# Patient Record
Sex: MALE | HISPANIC OR LATINO | Employment: FULL TIME | URBAN - METROPOLITAN AREA
[De-identification: names, ages, dates, MRNs, and addresses within clinical notes are randomized per-mention and may not be internally consistent; named-entity substitution may affect disease eponyms.]

---

## 2021-09-23 ENCOUNTER — HOSPITAL ENCOUNTER (EMERGENCY)
Facility: MEDICAL CENTER | Age: 31
End: 2021-09-23

## 2021-09-23 VITALS
HEIGHT: 73 IN | RESPIRATION RATE: 20 BRPM | OXYGEN SATURATION: 98 % | WEIGHT: 220 LBS | DIASTOLIC BLOOD PRESSURE: 106 MMHG | HEART RATE: 92 BPM | BODY MASS INDEX: 29.16 KG/M2 | SYSTOLIC BLOOD PRESSURE: 161 MMHG | TEMPERATURE: 97.5 F

## 2021-09-23 PROCEDURE — 302449 STATCHG TRIAGE ONLY (STATISTIC)

## 2021-09-23 NOTE — ED NOTES
PT stated he felt fine and no longer wanted to wait to see an physician. PT advised if symptoms return to seek further medical care. PT AMA form signed. PT dismissed.

## 2021-09-23 NOTE — ED TRIAGE NOTES
Waldemar Moreno  31 y.o.  male  Chief Complaint   Patient presents with   • Altered Mental Status     patient states that he is going to sleep at around 2 am. unable to focus and confused. also have a spike of fever , took 2 tabs of advil. patient dx with Covid last week. denies SOB/CP.    • Anxiety

## 2021-10-08 ENCOUNTER — TELEPHONE (OUTPATIENT)
Dept: SCHEDULING | Facility: IMAGING CENTER | Age: 31
End: 2021-10-08

## 2021-10-14 ENCOUNTER — OFFICE VISIT (OUTPATIENT)
Dept: URGENT CARE | Facility: PHYSICIAN GROUP | Age: 31
End: 2021-10-14
Payer: COMMERCIAL

## 2021-10-14 VITALS
RESPIRATION RATE: 18 BRPM | SYSTOLIC BLOOD PRESSURE: 142 MMHG | HEIGHT: 73 IN | OXYGEN SATURATION: 100 % | HEART RATE: 60 BPM | DIASTOLIC BLOOD PRESSURE: 90 MMHG | WEIGHT: 210 LBS | BODY MASS INDEX: 27.83 KG/M2 | TEMPERATURE: 98.1 F

## 2021-10-14 DIAGNOSIS — R22.42 LOCALIZED SWELLING OF LEFT LOWER EXTREMITY: ICD-10-CM

## 2021-10-14 PROCEDURE — 99203 OFFICE O/P NEW LOW 30 MIN: CPT | Performed by: PHYSICIAN ASSISTANT

## 2021-10-14 ASSESSMENT — ENCOUNTER SYMPTOMS
SENSORY CHANGE: 0
TINGLING: 0
MYALGIAS: 0
WEAKNESS: 0
SHORTNESS OF BREATH: 0
COUGH: 0
PALPITATIONS: 0

## 2021-10-14 NOTE — PROGRESS NOTES
"Subjective:   Waldemar Moreno is a 31 y.o. male who presents for Leg Swelling (x5 days, pt states left leg swelling, discomfort to put pressure )      HPI  31 y.o. male presents to urgent care with new problem to provider of localized swelling to left anterior shin noted few days ago.  Patient reports he may have gotten a bug bite and notes swelling, redness, and itching which has been improving since onset.  Denies specific injury.  He has not taken any over-the-counter antihistamines for symptomatic relief.  He denies unilateral lower extremity swelling, chest pain, or shortness of breath.  Reports history of COVID-19 viral infection about 1 month ago.  Patient is not vaccinated.  He denies history of DVT or PEs.    Review of Systems   Respiratory: Negative for cough and shortness of breath.    Cardiovascular: Negative for chest pain, palpitations and leg swelling.   Musculoskeletal: Negative for myalgias.   Skin: Positive for itching.        Redness, swelling of left shin   Neurological: Negative for tingling, sensory change and weakness.       There is no problem list on file for this patient.    History reviewed. No pertinent surgical history.  Social History     Tobacco Use   • Smoking status: Never Smoker   • Smokeless tobacco: Never Used   Vaping Use   • Vaping Use: Never used   Substance Use Topics   • Alcohol use: Yes     Comment: occ   • Drug use: Never      History reviewed. No pertinent family history.   (Allergies, Medications, & Tobacco/Substance Use were reconciled by the Medical Assistant and reviewed by myself. The family history is prepopulated)     Objective:     /90   Pulse 60   Temp 36.7 °C (98.1 °F) (Temporal)   Resp 18   Ht 1.854 m (6' 1\")   Wt 95.3 kg (210 lb)   SpO2 100%   BMI 27.71 kg/m²     Physical Exam  Vitals reviewed.   Constitutional:       Appearance: Normal appearance. He is well-developed.   HENT:      Head: Normocephalic and atraumatic.      Mouth/Throat:      Mouth: " Mucous membranes are moist.      Pharynx: Oropharynx is clear.   Eyes:      Conjunctiva/sclera: Conjunctivae normal.   Cardiovascular:      Rate and Rhythm: Normal rate and regular rhythm.      Heart sounds: Normal heart sounds.   Pulmonary:      Effort: Pulmonary effort is normal. No respiratory distress.      Breath sounds: Normal breath sounds.   Musculoskeletal:      Cervical back: Normal range of motion and neck supple.      Right lower leg: No edema.      Left lower leg: No edema.   Skin:     General: Skin is warm and dry.          Neurological:      General: No focal deficit present.      Mental Status: He is alert and oriented to person, place, and time.   Psychiatric:         Mood and Affect: Mood normal.         Behavior: Behavior normal.         Thought Content: Thought content normal.         Judgment: Judgment normal.         Assessment/Plan:     1. Localized swelling of left lower extremity       Area of localized swelling consistent with possible insect bite.  I recommend OTC Benadryl, rest, ice, and elevation.  Discussed return precautions including development of infection.  ED for worsening symptoms of unilateral lower extremity swelling, chest pain, or shortness of breath.  Patient has low risk for thrombosis.    Differential diagnosis, natural history, supportive care, and indications for immediate follow-up discussed.    Advised the patient to follow-up with the primary care physician for recheck, reevaluation, and consideration of further management.  Patient verbalized understanding of treatment plan and has no further questions regarding care.     I personally reviewed prior external notes and test results pertinent to today's visit.   Please note that this dictation was created using voice recognition software. I have made a reasonable attempt to correct obvious errors, but I expect that there are errors of grammar and possibly content that I did not discover before finalizing the  note.    This note was electronically signed by Denise Feliciano PA-C

## 2021-11-18 ENCOUNTER — HOSPITAL ENCOUNTER (OUTPATIENT)
Dept: LAB | Facility: MEDICAL CENTER | Age: 31
End: 2021-11-18
Attending: NURSE PRACTITIONER
Payer: COMMERCIAL

## 2021-11-18 ENCOUNTER — HOSPITAL ENCOUNTER (OUTPATIENT)
Dept: RADIOLOGY | Facility: MEDICAL CENTER | Age: 31
End: 2021-11-18
Attending: NURSE PRACTITIONER
Payer: COMMERCIAL

## 2021-11-18 ENCOUNTER — OFFICE VISIT (OUTPATIENT)
Dept: MEDICAL GROUP | Facility: PHYSICIAN GROUP | Age: 31
End: 2021-11-18
Payer: COMMERCIAL

## 2021-11-18 ENCOUNTER — TELEPHONE (OUTPATIENT)
Dept: MEDICAL GROUP | Facility: PHYSICIAN GROUP | Age: 31
End: 2021-11-18

## 2021-11-18 VITALS
SYSTOLIC BLOOD PRESSURE: 130 MMHG | TEMPERATURE: 98.1 F | OXYGEN SATURATION: 97 % | DIASTOLIC BLOOD PRESSURE: 90 MMHG | BODY MASS INDEX: 28.36 KG/M2 | HEART RATE: 60 BPM | WEIGHT: 214 LBS | HEIGHT: 73 IN

## 2021-11-18 DIAGNOSIS — R07.89 CHEST PRESSURE: ICD-10-CM

## 2021-11-18 DIAGNOSIS — R03.0 ELEVATED BLOOD PRESSURE READING IN OFFICE WITHOUT DIAGNOSIS OF HYPERTENSION: ICD-10-CM

## 2021-11-18 DIAGNOSIS — Z76.89 ENCOUNTER TO ESTABLISH CARE: ICD-10-CM

## 2021-11-18 DIAGNOSIS — S39.012A STRAIN OF LUMBAR PARASPINAL MUSCLE, INITIAL ENCOUNTER: ICD-10-CM

## 2021-11-18 PROBLEM — M54.50 ACUTE RIGHT-SIDED LOW BACK PAIN WITHOUT SCIATICA: Status: ACTIVE | Noted: 2021-11-18

## 2021-11-18 LAB
ALBUMIN SERPL BCP-MCNC: 4.8 G/DL (ref 3.2–4.9)
ALBUMIN/GLOB SERPL: 1.5 G/DL
ALP SERPL-CCNC: 75 U/L (ref 30–99)
ALT SERPL-CCNC: 11 U/L (ref 2–50)
ANION GAP SERPL CALC-SCNC: 9 MMOL/L (ref 7–16)
AST SERPL-CCNC: 15 U/L (ref 12–45)
BASOPHILS # BLD AUTO: 0.4 % (ref 0–1.8)
BASOPHILS # BLD: 0.02 K/UL (ref 0–0.12)
BILIRUB SERPL-MCNC: 1.6 MG/DL (ref 0.1–1.5)
BUN SERPL-MCNC: 17 MG/DL (ref 8–22)
CALCIUM SERPL-MCNC: 9.9 MG/DL (ref 8.5–10.5)
CHLORIDE SERPL-SCNC: 102 MMOL/L (ref 96–112)
CO2 SERPL-SCNC: 28 MMOL/L (ref 20–33)
CREAT SERPL-MCNC: 1.17 MG/DL (ref 0.5–1.4)
EOSINOPHIL # BLD AUTO: 0.11 K/UL (ref 0–0.51)
EOSINOPHIL NFR BLD: 2.2 % (ref 0–6.9)
ERYTHROCYTE [DISTWIDTH] IN BLOOD BY AUTOMATED COUNT: 41.1 FL (ref 35.9–50)
GLOBULIN SER CALC-MCNC: 3.2 G/DL (ref 1.9–3.5)
GLUCOSE SERPL-MCNC: 97 MG/DL (ref 65–99)
HCT VFR BLD AUTO: 46.2 % (ref 42–52)
HGB BLD-MCNC: 15.9 G/DL (ref 14–18)
IMM GRANULOCYTES # BLD AUTO: 0.02 K/UL (ref 0–0.11)
IMM GRANULOCYTES NFR BLD AUTO: 0.4 % (ref 0–0.9)
LYMPHOCYTES # BLD AUTO: 2.38 K/UL (ref 1–4.8)
LYMPHOCYTES NFR BLD: 46.8 % (ref 22–41)
MCH RBC QN AUTO: 31.5 PG (ref 27–33)
MCHC RBC AUTO-ENTMCNC: 34.4 G/DL (ref 33.7–35.3)
MCV RBC AUTO: 91.7 FL (ref 81.4–97.8)
MONOCYTES # BLD AUTO: 0.44 K/UL (ref 0–0.85)
MONOCYTES NFR BLD AUTO: 8.6 % (ref 0–13.4)
NEUTROPHILS # BLD AUTO: 2.12 K/UL (ref 1.82–7.42)
NEUTROPHILS NFR BLD: 41.6 % (ref 44–72)
NRBC # BLD AUTO: 0 K/UL
NRBC BLD-RTO: 0 /100 WBC
PLATELET # BLD AUTO: 167 K/UL (ref 164–446)
PMV BLD AUTO: 12.1 FL (ref 9–12.9)
POTASSIUM SERPL-SCNC: 4.7 MMOL/L (ref 3.6–5.5)
PROT SERPL-MCNC: 8 G/DL (ref 6–8.2)
RBC # BLD AUTO: 5.04 M/UL (ref 4.7–6.1)
SODIUM SERPL-SCNC: 139 MMOL/L (ref 135–145)
TROPONIN T SERPL-MCNC: 11 NG/L (ref 6–19)
TSH SERPL DL<=0.005 MIU/L-ACNC: 4.44 UIU/ML (ref 0.38–5.33)
WBC # BLD AUTO: 5.1 K/UL (ref 4.8–10.8)

## 2021-11-18 PROCEDURE — 93000 ELECTROCARDIOGRAM COMPLETE: CPT | Performed by: NURSE PRACTITIONER

## 2021-11-18 PROCEDURE — 36415 COLL VENOUS BLD VENIPUNCTURE: CPT

## 2021-11-18 PROCEDURE — 80053 COMPREHEN METABOLIC PANEL: CPT

## 2021-11-18 PROCEDURE — 99214 OFFICE O/P EST MOD 30 MIN: CPT | Performed by: NURSE PRACTITIONER

## 2021-11-18 PROCEDURE — 84443 ASSAY THYROID STIM HORMONE: CPT

## 2021-11-18 PROCEDURE — 85025 COMPLETE CBC W/AUTO DIFF WBC: CPT

## 2021-11-18 PROCEDURE — 84484 ASSAY OF TROPONIN QUANT: CPT

## 2021-11-18 PROCEDURE — 71046 X-RAY EXAM CHEST 2 VIEWS: CPT

## 2021-11-18 ASSESSMENT — PATIENT HEALTH QUESTIONNAIRE - PHQ9: CLINICAL INTERPRETATION OF PHQ2 SCORE: 0

## 2021-11-18 NOTE — TELEPHONE ENCOUNTER
----- Message from BRENTON Somers sent at 11/18/2021  2:56 PM PST -----  Please call patient and inform that his chest x-ray shows that his heart is normal size, no rib fractures or lung abnormalities.  We will wait on his lab results.  Again if he has any chest pain that is not resolving he is to go to the emergency room right away.  Thank you    BRENTON Somers

## 2021-11-18 NOTE — ASSESSMENT & PLAN NOTE
Acute medical problem. His blood pressure today is 130/90. He states that he does eat a lot of fast food and has a high salt diet. He has family history of hypertension in his father.

## 2021-11-18 NOTE — ASSESSMENT & PLAN NOTE
Acute medical problem. The chest pressure started 2 days ago. The pressure is intermittent. He woke up with the chest pressure. He does lift weights, no recent chest workouts. He is having back pain and increased stress in his life regarding his health. No aggravating factors, no chest pressure today. Denies crushing chest pain, shortness of breath, dizziness, palpations, blurry vision, left jaw pain, left arm pain, or diaphoresis. He has family history of hypertension and in his father elevated cholesterol.

## 2021-11-18 NOTE — ASSESSMENT & PLAN NOTE
Acute medical problem. The low back pain started 1 week ago. He has bilateral low back pain. He states that the back pain occurred after leaning over at a odd angle when bending down to  an object.The pain has improved with rest and Advil. On his days off from work his back pain improves.  He reports that 2 months ago he had COVID. Denies dysuria, hematuria, frequency, falls or trauma.

## 2021-11-18 NOTE — PROGRESS NOTES
Subjective:     CC:  Diagnoses of Encounter to establish care, Acute right-sided low back pain without sciatica, Chest pressure, and Elevated blood pressure reading in office without diagnosis of hypertension were pertinent to this visit.    HISTORY OF THE PRESENT ILLNESS: Patient is a 31 y.o. male. This pleasant patient is here today to establish care and discuss the following. He has not had prior PCP.    Acute right-sided low back pain without sciatica  Acute medical problem. The low back pain started 1 week ago. He has bilateral low back pain. He states that the back pain occurred after leaning over at a odd angle when bending down to  an object.The pain has improved with rest and Advil. On his days off from work his back pain improves.  He reports that 2 months ago he had COVID. Denies dysuria, hematuria, frequency, falls or trauma.    Chest pressure  Acute medical problem. The chest pressure started 2 days ago. The pressure is intermittent. He woke up with the chest pressure. He does lift weights, no recent chest workouts. He is having back pain and increased stress in his life regarding his health. No aggravating factors, no chest pressure today. Denies crushing chest pain, shortness of breath, dizziness, palpations, blurry vision, left jaw pain, left arm pain, or diaphoresis. He has family history of hypertension and in his father elevated cholesterol.     Elevated blood pressure reading in office without diagnosis of hypertension  Acute medical problem. His blood pressure today is 130/90. He states that he does eat a lot of fast food and has a high salt diet. He has family history of hypertension in his father.      Allergies: Patient has no known allergies.    No current [facility]-ordered outpatient medications on file.     No current [facility]-ordered facility-administered medications on file.       Past Medical History:   Diagnosis Date   • COVID        History reviewed. No pertinent surgical  "history.    Social History     Tobacco Use   • Smoking status: Never Smoker   • Smokeless tobacco: Never Used   Vaping Use   • Vaping Use: Never used   Substance Use Topics   • Alcohol use: Yes     Comment: occ   • Drug use: Never       Social History     Social History Narrative   • Not on file       Family History   Problem Relation Age of Onset   • No Known Problems Mother    • Hypertension Father    • Hyperlipidemia Father    • Hypertension Brother    • Obesity Brother    • No Known Problems Maternal Grandmother    • No Known Problems Maternal Grandfather    • No Known Problems Paternal Grandmother    • No Known Problems Paternal Grandfather        Health Maintenance: Due for COVID vaccine, Tdap and influenza vaccine. Declines vaccines today.       Objective:     Vital signs reviewed   Exam: /90 (BP Location: Left arm, Patient Position: Sitting, BP Cuff Size: Adult)   Pulse 60   Temp 36.7 °C (98.1 °F) (Temporal)   Ht 1.854 m (6' 1\")   Wt 97.1 kg (214 lb)   SpO2 97%  Body mass index is 28.23 kg/m².    Gen: Alert and oriented, No apparent distress.  Neck: Neck is supple without lymphadenopathy.  Lungs: Normal effort, CTA bilaterally, no wheezes, rhonchi, or rales.   No CVA tenderness.  CV: Regular rate and rhythm. No murmurs, rubs, or gallops. No chest pain or pressure today, no pain on palpation to chest or surrounding musculature.   Ext: No clubbing, cyanosis, edema. No pain on palpation to spinous processes. No pain on palpation to lower lumbar.      EKG Interpretation-HR is 51 there are no previous tracings available for comparison, normal sinus rhythm, with slight ST elevation in leads V3-V5.      Assessment & Plan:   31 y.o. male with the following -    1. Encounter to establish care  Acute uncomplicated problem. Care established.     2. Strain of lumbar paraspinal muscle, initial encounter  Acute uncomplicated problem.  We discussed his symptoms are consistent with muscular strain.  Recommend he " continue with stretching and anti-inflammatories as needed.  We discussed good body mechanics when lifting objects.  Activity as tolerated.    3. Chest pressure  Acute complicated problem.  I reviewed his EKG today in office with our physician Dr. Salinas recommends can complete outpatient work-up.  Patient does not have any chest pressure or chest pain today.  Will check stat CBC, CMP, TSH and troponin today.  Will check stat chest x-ray today.  Discussed with patient that if his symptoms worsen he is to go to the emergency room right away.  He verbalized understanding.    I discussed his chest xray with results with him.   IMPRESSION:   No active disease    - EKG  - Lipid Profile; Future  - CBC WITH DIFFERENTIAL; Future  - Comp Metabolic Panel; Future  - TSH WITH REFLEX TO FT4; Future  - TROPONIN; Future  - DX-CHEST-2 VIEWS; Future    4. Elevated blood pressure reading in office without diagnosis of hypertension  Acute uncomplicated problem.  Checking stat labs for CBC, TSH, CMP and lipid panel today.  He will return in 2 weeks for follow-up on his blood pressure.  We discussed decreasing his salt intake and exercising as tolerated.  He states that his normal weight is around 220 pounds but he did lose 10 pounds with his recent Covid infection.  - Lipid Profile; Future  - CBC WITH DIFFERENTIAL; Future  - Comp Metabolic Panel; Future  - TSH WITH REFLEX TO FT4; Future          Return in about 2 weeks (around 12/2/2021) for Labs.    Please note that this dictation was created using voice recognition software. I have made every reasonable attempt to correct obvious errors, but I expect that there are errors of grammar and possibly content that I did not discover before finalizing the note.

## 2021-11-18 NOTE — TELEPHONE ENCOUNTER
I called the patient and left a message, as well as sent a message, conveying Rylee's message that  his chest x-ray shows that his heart is normal size, no rib fractures or lung abnormalities.  I let him know that we are waiting on the lab results.  I told the patient that if he has any chest pain that is not resolving he is to go to the emergency room right away.

## 2021-12-02 ENCOUNTER — OFFICE VISIT (OUTPATIENT)
Dept: MEDICAL GROUP | Facility: PHYSICIAN GROUP | Age: 31
End: 2021-12-02
Payer: COMMERCIAL

## 2021-12-02 VITALS
HEIGHT: 73 IN | HEART RATE: 53 BPM | BODY MASS INDEX: 28.23 KG/M2 | SYSTOLIC BLOOD PRESSURE: 136 MMHG | OXYGEN SATURATION: 99 % | DIASTOLIC BLOOD PRESSURE: 86 MMHG | TEMPERATURE: 97.7 F | WEIGHT: 213 LBS

## 2021-12-02 DIAGNOSIS — M54.50 ACUTE RIGHT-SIDED LOW BACK PAIN WITHOUT SCIATICA: ICD-10-CM

## 2021-12-02 DIAGNOSIS — R03.0 ELEVATED BLOOD PRESSURE READING IN OFFICE WITHOUT DIAGNOSIS OF HYPERTENSION: ICD-10-CM

## 2021-12-02 DIAGNOSIS — R42 VERTIGO: ICD-10-CM

## 2021-12-02 PROBLEM — R07.89 CHEST PRESSURE: Status: RESOLVED | Noted: 2021-11-18 | Resolved: 2021-12-02

## 2021-12-02 PROCEDURE — 99214 OFFICE O/P EST MOD 30 MIN: CPT | Performed by: NURSE PRACTITIONER

## 2021-12-02 RX ORDER — MECLIZINE HYDROCHLORIDE 25 MG/1
25 TABLET ORAL 3 TIMES DAILY PRN
Qty: 30 TABLET | Refills: 0 | Status: SHIPPED | OUTPATIENT
Start: 2021-12-02 | End: 2022-06-02

## 2021-12-02 ASSESSMENT — ANXIETY QUESTIONNAIRES
1. FEELING NERVOUS, ANXIOUS, OR ON EDGE: NOT AT ALL
5. BEING SO RESTLESS THAT IT IS HARD TO SIT STILL: NOT AT ALL
2. NOT BEING ABLE TO STOP OR CONTROL WORRYING: NOT AT ALL
3. WORRYING TOO MUCH ABOUT DIFFERENT THINGS: NOT AT ALL
4. TROUBLE RELAXING: SEVERAL DAYS
7. FEELING AFRAID AS IF SOMETHING AWFUL MIGHT HAPPEN: NOT AT ALL
6. BECOMING EASILY ANNOYED OR IRRITABLE: NOT AT ALL
GAD7 TOTAL SCORE: 1

## 2021-12-02 ASSESSMENT — FIBROSIS 4 INDEX: FIB4 SCORE: 0.84

## 2021-12-02 NOTE — ASSESSMENT & PLAN NOTE
He states that his back pain is improved but still having intermittent soreness. He continues with stretching. He is active.

## 2021-12-02 NOTE — PROGRESS NOTES
"Subjective:     CC: lab results     HPI:   Waldemar presents today with the following:     Acute right-sided low back pain without sciatica  He states that his back pain is improved but still having intermittent soreness. He continues with stretching. He is active.     Elevated blood pressure reading in office without diagnosis of hypertension  His initial blood pressure today is 136/86 today. Last 4 days had dizziness that felt that the \"room is spinning\". the dizziness worsened with bending over. No symptoms today. No recurrent chest pain. His brother who is 35 is on blood pressure medication. His father was diagnosed late 50's and started on blood pressure medication. He had recent labs completed that he would like to review.       Past Medical History:   Diagnosis Date   • COVID        Social History     Tobacco Use   • Smoking status: Never Smoker   • Smokeless tobacco: Never Used   Vaping Use   • Vaping Use: Never used   Substance Use Topics   • Alcohol use: Yes     Comment: occ   • Drug use: Never       Current Outpatient Medications Ordered in Epic   Medication Sig Dispense Refill   • meclizine (ANTIVERT) 25 MG Tab Take 1 Tablet by mouth 3 times a day as needed. 30 Tablet 0     No current Epic-ordered facility-administered medications on file.       Allergies:  Patient has no known allergies.    Health Maintenance: Due for vaccines. Declines today.       Objective:     Vital signs reviewed   Exam:  /86 (BP Location: Right arm, Patient Position: Sitting, BP Cuff Size: Adult)   Pulse (!) 53   Temp 36.5 °C (97.7 °F) (Temporal)   Ht 1.854 m (6' 1\")   Wt 96.6 kg (213 lb)   SpO2 99%   BMI 28.10 kg/m²  Body mass index is 28.1 kg/m².      General: Normal appearing. No distress.  HENT: Normocephalic. Ears normal shape and contour, canals are clear bilaterally, tympanic membranes are benign.  Eyes: Eyes conjunctiva clear lids without ptosis,lids normal.  Pulmonary: Clear to ausculation.  Normal effort. No " rales, ronchi, or wheezing.  Cardiovascular: Regular rate and rhythm without murmur.   Skin: Warm and dry.  No obvious lesions.  Musculoskeletal: Normal gait. No extremity cyanosis, clubbing, or edema.  Psych: Normal mood and affect. Alert and oriented x3. Judgment and insight is normal.      Assessment & Plan:     31 y.o. male with the following -     1. Vertigo  Acute uncomplicated problem. Ear exam benign. Start meclizine. Return if symptoms worsen.   - meclizine (ANTIVERT) 25 MG Tab; Take 1 Tablet by mouth 3 times a day as needed.  Dispense: 30 Tablet; Refill: 0    2. Elevated blood pressure reading in office without diagnosis of hypertension  Chronic stable problem. Blood pressure is at goal today. Discussed diet and lifestyle modifications and reinforced diet and lifestyle modifications. Discussed purchasing a blood pressure cuff and monitoring his blood pressure at home. Discussed his question of the less than 130/80. Home blood pressure instruction provided to patient today. Return in 6 months or sooner if his blood pressure is above goal.    3. Acute right-sided low back pain without sciatica  Chronic stable problem. Continue stretching exercises. Discussed using good body mechanics. Continue to monitor.       Return in about 6 months (around 6/2/2022) for blood pressure.    Please note that this dictation was created using voice recognition software. I have made every reasonable attempt to correct obvious errors, but I expect that there are errors of grammar and possibly content that I did not discover before finalizing the note.

## 2021-12-02 NOTE — ASSESSMENT & PLAN NOTE
"His initial blood pressure today is 136/86 today. Last 4 days had dizziness that felt that the \"room is spinning\". the dizziness worsened with bending over. No symptoms today. No recurrent chest pain. His brother who is 35 is on blood pressure medication. His father was diagnosed late 50's and started on blood pressure medication. He had recent labs completed that he would like to review.   "

## 2022-06-02 ENCOUNTER — OFFICE VISIT (OUTPATIENT)
Dept: MEDICAL GROUP | Facility: PHYSICIAN GROUP | Age: 32
End: 2022-06-02
Payer: COMMERCIAL

## 2022-06-02 VITALS
SYSTOLIC BLOOD PRESSURE: 128 MMHG | WEIGHT: 211 LBS | HEART RATE: 62 BPM | DIASTOLIC BLOOD PRESSURE: 80 MMHG | TEMPERATURE: 97.9 F | BODY MASS INDEX: 27.96 KG/M2 | OXYGEN SATURATION: 98 % | HEIGHT: 73 IN

## 2022-06-02 DIAGNOSIS — R03.0 ELEVATED BLOOD PRESSURE READING IN OFFICE WITHOUT DIAGNOSIS OF HYPERTENSION: ICD-10-CM

## 2022-06-02 PROBLEM — M54.50 ACUTE RIGHT-SIDED LOW BACK PAIN WITHOUT SCIATICA: Status: RESOLVED | Noted: 2021-11-18 | Resolved: 2022-06-02

## 2022-06-02 PROCEDURE — 99212 OFFICE O/P EST SF 10 MIN: CPT | Performed by: NURSE PRACTITIONER

## 2022-06-02 ASSESSMENT — PATIENT HEALTH QUESTIONNAIRE - PHQ9: CLINICAL INTERPRETATION OF PHQ2 SCORE: 0

## 2022-06-02 ASSESSMENT — FIBROSIS 4 INDEX: FIB4 SCORE: 0.84

## 2022-06-02 NOTE — PROGRESS NOTES
"Subjective:     CC: hypertension follow-up    HPI:   Waldemar presents today with the following:    Elevated blood pressure reading in office without diagnosis of hypertension  His blood pressure is at goal today. He has been checking his blood pressure at home. His home readings have been ranging 120-130's/50-80's. He watches his diet and exercises.       Past Medical History:   Diagnosis Date   • COVID        Social History     Tobacco Use   • Smoking status: Never Smoker   • Smokeless tobacco: Never Used   Vaping Use   • Vaping Use: Never used   Substance Use Topics   • Alcohol use: Yes     Comment: occ   • Drug use: Never       No current Epic-ordered outpatient medications on file.     No current Epic-ordered facility-administered medications on file.       Allergies:  Patient has no known allergies.    Health Maintenance: Due for COVID vaccine and Tdap. Declines Tdap today.       Objective:     Vital signs reviewed  Exam:  /80 (BP Location: Left arm, Patient Position: Sitting, BP Cuff Size: Adult)   Pulse 62   Temp 36.6 °C (97.9 °F) (Temporal)   Ht 1.854 m (6' 1\")   Wt 95.7 kg (211 lb)   SpO2 98%   BMI 27.84 kg/m²  Body mass index is 27.84 kg/m².    Gen: Alert and oriented, No apparent distress.  Lungs: Normal effort, CTA bilaterally, no wheezes, rhonchi, or rales  CV: Regular rate and rhythm. No murmurs, rubs, or gallops.  Ext: No clubbing, cyanosis, edema.      Assessment & Plan:     31 y.o. male with the following -     1. Elevated blood pressure reading in office without diagnosis of hypertension  Chronic stable problem. Problem resolved. Continue intermittent home blood pressure monitoring. Continue health diet and 150 minutes a week of moderate intensity exercise. Return for annual around November 2022.       Return in about 5 months (around 11/2/2022) for annual.    Please note that this dictation was created using voice recognition software. I have made every reasonable attempt to correct " obvious errors, but I expect that there are errors of grammar and possibly content that I did not discover before finalizing the note.

## 2022-06-02 NOTE — ASSESSMENT & PLAN NOTE
His blood pressure is at goal today. He has been checking his blood pressure at home. His home readings have been ranging 120-130's/50-80's. He watches his diet and exercises.

## 2022-11-07 ENCOUNTER — TELEPHONE (OUTPATIENT)
Dept: MEDICAL GROUP | Facility: PHYSICIAN GROUP | Age: 32
End: 2022-11-07
Payer: COMMERCIAL

## 2023-03-09 ENCOUNTER — OFFICE VISIT (OUTPATIENT)
Dept: MEDICAL GROUP | Facility: PHYSICIAN GROUP | Age: 33
End: 2023-03-09
Payer: COMMERCIAL

## 2023-03-09 VITALS
OXYGEN SATURATION: 98 % | DIASTOLIC BLOOD PRESSURE: 74 MMHG | HEART RATE: 56 BPM | HEIGHT: 73 IN | WEIGHT: 214 LBS | TEMPERATURE: 97 F | SYSTOLIC BLOOD PRESSURE: 124 MMHG | BODY MASS INDEX: 28.36 KG/M2

## 2023-03-09 DIAGNOSIS — Z11.59 NEED FOR HEPATITIS C SCREENING TEST: ICD-10-CM

## 2023-03-09 DIAGNOSIS — Z13.29 SCREENING FOR ENDOCRINE, METABOLIC AND IMMUNITY DISORDER: ICD-10-CM

## 2023-03-09 DIAGNOSIS — Z13.0 SCREENING FOR ENDOCRINE, METABOLIC AND IMMUNITY DISORDER: ICD-10-CM

## 2023-03-09 DIAGNOSIS — Z13.228 SCREENING FOR ENDOCRINE, METABOLIC AND IMMUNITY DISORDER: ICD-10-CM

## 2023-03-09 DIAGNOSIS — Z13.220 SCREENING FOR LIPID DISORDERS: ICD-10-CM

## 2023-03-09 DIAGNOSIS — Z00.00 ANNUAL VISIT FOR GENERAL ADULT MEDICAL EXAMINATION WITHOUT ABNORMAL FINDINGS: ICD-10-CM

## 2023-03-09 PROCEDURE — 99395 PREV VISIT EST AGE 18-39: CPT | Performed by: NURSE PRACTITIONER

## 2023-03-09 SDOH — ECONOMIC STABILITY: INCOME INSECURITY: HOW HARD IS IT FOR YOU TO PAY FOR THE VERY BASICS LIKE FOOD, HOUSING, MEDICAL CARE, AND HEATING?: NOT VERY HARD

## 2023-03-09 SDOH — ECONOMIC STABILITY: INCOME INSECURITY: IN THE LAST 12 MONTHS, WAS THERE A TIME WHEN YOU WERE NOT ABLE TO PAY THE MORTGAGE OR RENT ON TIME?: NO

## 2023-03-09 SDOH — ECONOMIC STABILITY: FOOD INSECURITY: WITHIN THE PAST 12 MONTHS, YOU WORRIED THAT YOUR FOOD WOULD RUN OUT BEFORE YOU GOT MONEY TO BUY MORE.: NEVER TRUE

## 2023-03-09 SDOH — ECONOMIC STABILITY: FOOD INSECURITY: WITHIN THE PAST 12 MONTHS, THE FOOD YOU BOUGHT JUST DIDN'T LAST AND YOU DIDN'T HAVE MONEY TO GET MORE.: NEVER TRUE

## 2023-03-09 SDOH — ECONOMIC STABILITY: TRANSPORTATION INSECURITY
IN THE PAST 12 MONTHS, HAS LACK OF TRANSPORTATION KEPT YOU FROM MEETINGS, WORK, OR FROM GETTING THINGS NEEDED FOR DAILY LIVING?: NO

## 2023-03-09 SDOH — ECONOMIC STABILITY: HOUSING INSECURITY
IN THE LAST 12 MONTHS, WAS THERE A TIME WHEN YOU DID NOT HAVE A STEADY PLACE TO SLEEP OR SLEPT IN A SHELTER (INCLUDING NOW)?: NO

## 2023-03-09 SDOH — ECONOMIC STABILITY: TRANSPORTATION INSECURITY
IN THE PAST 12 MONTHS, HAS THE LACK OF TRANSPORTATION KEPT YOU FROM MEDICAL APPOINTMENTS OR FROM GETTING MEDICATIONS?: NO

## 2023-03-09 SDOH — HEALTH STABILITY: PHYSICAL HEALTH: ON AVERAGE, HOW MANY DAYS PER WEEK DO YOU ENGAGE IN MODERATE TO STRENUOUS EXERCISE (LIKE A BRISK WALK)?: 4 DAYS

## 2023-03-09 SDOH — ECONOMIC STABILITY: TRANSPORTATION INSECURITY
IN THE PAST 12 MONTHS, HAS LACK OF RELIABLE TRANSPORTATION KEPT YOU FROM MEDICAL APPOINTMENTS, MEETINGS, WORK OR FROM GETTING THINGS NEEDED FOR DAILY LIVING?: NO

## 2023-03-09 SDOH — ECONOMIC STABILITY: HOUSING INSECURITY: IN THE LAST 12 MONTHS, HOW MANY PLACES HAVE YOU LIVED?: 2

## 2023-03-09 SDOH — HEALTH STABILITY: PHYSICAL HEALTH: ON AVERAGE, HOW MANY MINUTES DO YOU ENGAGE IN EXERCISE AT THIS LEVEL?: 60 MIN

## 2023-03-09 SDOH — HEALTH STABILITY: MENTAL HEALTH
STRESS IS WHEN SOMEONE FEELS TENSE, NERVOUS, ANXIOUS, OR CAN'T SLEEP AT NIGHT BECAUSE THEIR MIND IS TROUBLED. HOW STRESSED ARE YOU?: NOT AT ALL

## 2023-03-09 ASSESSMENT — SOCIAL DETERMINANTS OF HEALTH (SDOH)
HOW OFTEN DO YOU ATTEND CHURCH OR RELIGIOUS SERVICES?: 1 TO 4 TIMES PER YEAR
HOW MANY DRINKS CONTAINING ALCOHOL DO YOU HAVE ON A TYPICAL DAY WHEN YOU ARE DRINKING: 1 OR 2
DO YOU BELONG TO ANY CLUBS OR ORGANIZATIONS SUCH AS CHURCH GROUPS UNIONS, FRATERNAL OR ATHLETIC GROUPS, OR SCHOOL GROUPS?: NO
DO YOU BELONG TO ANY CLUBS OR ORGANIZATIONS SUCH AS CHURCH GROUPS UNIONS, FRATERNAL OR ATHLETIC GROUPS, OR SCHOOL GROUPS?: NO
WITHIN THE PAST 12 MONTHS, YOU WORRIED THAT YOUR FOOD WOULD RUN OUT BEFORE YOU GOT THE MONEY TO BUY MORE: NEVER TRUE
HOW OFTEN DO YOU ATTENT MEETINGS OF THE CLUB OR ORGANIZATION YOU BELONG TO?: NEVER
IN A TYPICAL WEEK, HOW MANY TIMES DO YOU TALK ON THE PHONE WITH FAMILY, FRIENDS, OR NEIGHBORS?: ONCE A WEEK
ARE YOU MARRIED, WIDOWED, DIVORCED, SEPARATED, NEVER MARRIED, OR LIVING WITH A PARTNER?: LIVING WITH PARTNER
HOW OFTEN DO YOU GET TOGETHER WITH FRIENDS OR RELATIVES?: ONCE A WEEK
HOW OFTEN DO YOU ATTEND CHURCH OR RELIGIOUS SERVICES?: 1 TO 4 TIMES PER YEAR
HOW OFTEN DO YOU HAVE A DRINK CONTAINING ALCOHOL: 2-4 TIMES A MONTH
HOW HARD IS IT FOR YOU TO PAY FOR THE VERY BASICS LIKE FOOD, HOUSING, MEDICAL CARE, AND HEATING?: NOT VERY HARD
HOW OFTEN DO YOU GET TOGETHER WITH FRIENDS OR RELATIVES?: ONCE A WEEK
HOW OFTEN DO YOU HAVE SIX OR MORE DRINKS ON ONE OCCASION: LESS THAN MONTHLY
HOW OFTEN DO YOU ATTENT MEETINGS OF THE CLUB OR ORGANIZATION YOU BELONG TO?: NEVER
IN A TYPICAL WEEK, HOW MANY TIMES DO YOU TALK ON THE PHONE WITH FAMILY, FRIENDS, OR NEIGHBORS?: ONCE A WEEK
ARE YOU MARRIED, WIDOWED, DIVORCED, SEPARATED, NEVER MARRIED, OR LIVING WITH A PARTNER?: LIVING WITH PARTNER

## 2023-03-09 ASSESSMENT — LIFESTYLE VARIABLES
AUDIT-C TOTAL SCORE: 3
SKIP TO QUESTIONS 9-10: 0
HOW OFTEN DO YOU HAVE A DRINK CONTAINING ALCOHOL: 2-4 TIMES A MONTH
HOW OFTEN DO YOU HAVE SIX OR MORE DRINKS ON ONE OCCASION: LESS THAN MONTHLY
HOW MANY STANDARD DRINKS CONTAINING ALCOHOL DO YOU HAVE ON A TYPICAL DAY: 1 OR 2

## 2023-03-09 ASSESSMENT — FIBROSIS 4 INDEX: FIB4 SCORE: 0.87

## 2023-03-09 ASSESSMENT — PATIENT HEALTH QUESTIONNAIRE - PHQ9: CLINICAL INTERPRETATION OF PHQ2 SCORE: 0

## 2023-03-09 NOTE — PROGRESS NOTES
Subjective:     CC:   Chief Complaint   Patient presents with    Annual Exam    Requesting Labs       HPI:   Waldemar Moreno is a 32 y.o. male who presents for an annual exam. He is feeling well and has no complaints.    Health Maintenance  Cholesterol Screening: Labs ordered   Diabetes Screening: Labs ordered   Diet: He is improving his diet. He is eating half meals at home and half fast food. He has decreased his fast food intake. At home he is eating chicken, rice, steak, vegetables, and he is taking daily vitamins.  He is working on increasing his vegetable and fruit intake. Drinking rare sodas. Occasional energy drinks.    Exercise: He is going to the gym 4-5 times a week for 60 minutes.  He tries to stay active.   Substance Abuse: Discussed and reviewed.    Safe in relationship.   Seat belts and gun safety discussed.  Sun protection used.    Cancer screening  Colorectal Cancer Screening: n/a    Lung Cancer Screening: n/a    Prostate Cancer Screening/PSA: n/a     Discussed screening age guidelines.     Infectious disease screening/Immunizations  --STI Screening: declines today  --Practices safe sex.  --HIV Screening: declines   --Hepatitis C Screening: ordered today   --Immunizations:    Influenza: declines    Tetanus: Due, discussed can come in for MA visit to complete when ready    Shingles: n/a    Pneumococcal : n/a     Other immunizations: Declines COVID vaccine. Discussed hepatitis  B vaccine and that he can complete as MA visit with us when he is ready.    He  has a past medical history of COVID.    He has no past medical history of Anxiety, Depression, or Hypertension.  He  has no past surgical history on file.  Family History   Problem Relation Age of Onset    No Known Problems Mother     Hypertension Father     Hyperlipidemia Father     Hypertension Brother     Obesity Brother     No Known Problems Maternal Grandmother     No Known Problems Maternal Grandfather     No Known Problems Paternal Grandmother   "   No Known Problems Paternal Grandfather      Social History     Tobacco Use    Smoking status: Never    Smokeless tobacco: Never   Vaping Use    Vaping Use: Never used   Substance Use Topics    Alcohol use: Yes     Comment: occ    Drug use: Never       There are no problems to display for this patient.      Current Outpatient Medications   Medication Sig Dispense Refill    multivitamin Tab Take 1 Tablet by mouth every day.      VITAMIN D, CHOLECALCIFEROL, PO Take  by mouth every day.      Omega-3 Fatty Acids (FISH OIL PO) Take 2 Tablets by mouth every day.       No current facility-administered medications for this visit.    (including changes today)  Allergies: Patient has no known allergies.    Review of Systems   Constitutional: Negative for fever, chills and malaise/fatigue.   HENT: Negative for congestion.    Eyes: Negative for pain.   Respiratory: Negative for cough and shortness of breath.    Cardiovascular: Negative for leg swelling.   Gastrointestinal: Negative for nausea, vomiting, abdominal pain and diarrhea.   Genitourinary: Negative for dysuria and hematuria.   Skin: Negative for rash.   Neurological: Negative for dizziness, focal weakness and headaches.   Endo/Heme/Allergies: Does not bruise/bleed easily.   Psychiatric/Behavioral: Negative for depression.  The patient is not nervous/anxious.      Objective:     Vital signs reviewed  /74 (BP Location: Right arm, Patient Position: Sitting, BP Cuff Size: Adult)   Pulse (!) 56   Temp 36.1 °C (97 °F) (Temporal)   Ht 1.854 m (6' 1\")   Wt 97.1 kg (214 lb)   SpO2 98%   BMI 28.23 kg/m²   Body mass index is 28.23 kg/m².  Wt Readings from Last 4 Encounters:   03/09/23 97.1 kg (214 lb)   06/02/22 95.7 kg (211 lb)   12/02/21 96.6 kg (213 lb)   11/18/21 97.1 kg (214 lb)       Physical Exam:  Constitutional: Well-developed and well-nourished. Not diaphoretic. No distress.   Skin: Skin is warm and dry. No rash noted.  Head: Atraumatic without " lesions.  Eyes: Conjunctivae and extraocular motions are normal. Pupils are equal, round, and reactive to light. No scleral icterus.   Ears:  External ears unremarkable. Tympanic membranes clear and intact.  Nose: Nares patent. Septum midline. Turbinates without erythema nor edema. No discharge.   Mouth/Throat: Dentition is intact. Tongue normal. Oropharynx is clear and moist. Posterior pharynx without erythema or exudates.  Neck: Supple, trachea midline. Normal range of motion. No thyromegaly present. No lymphadenopathy--cervical or supraclavicular.  Cardiovascular: Regular rate and rhythm, S1 and S2 without murmur, rubs, or gallops.    Lungs: Effort normal. Clear to auscultation throughout. No adventitious sounds. Abdomen: Soft, non tender, and without distention. Active bowel sounds in all four quadrants. No rebound, guarding, masses or HSM.  : Genitalia: deferred  Rectal: deferred  Prostate: deferred  Extremities: No cyanosis, clubbing, erythema, nor edema.   Musculoskeletal: All major joints AROM full in all directions without pain.  Neurological: Alert and oriented x 3.  Psychiatric:  Behavior, mood, and affect are appropriate.      Assessment and Plan:     1. Annual visit for general adult medical examination without abnormal findings  Acute uncomplicated problem.  Annual exam completed today. Discussion today about general wellness and lifestyle habits:  Engage in regular physical and social activities  Prevent falls and reduce trip hazards, using ambulatory aids, hearing and vision testing if appropriate  Skin care, including sunscreen  Recommended annual eye exams and annual dental exams  Discussed wearing seatbelt when in car at all times and wearing bicycle helmet     2. Screening for lipid disorders  Acute uncomplicated problem.  He is interested in screening today. We will follow-up with his results and his MyChart.  - Lipid Profile; Future    3. Need for hepatitis C screening test  Acute  uncomplicated problem.  Discussed for hepatitis C and he is in agreement.  - HEP C VIRUS ANTIBODY; Future    4. Screening for endocrine, metabolic and immunity disorder  Acute uncomplicated problem.  He is interested in screening today.  Follow-up with lab results in [facility].  - CBC WITH DIFFERENTIAL; Future  - Comp Metabolic Panel; Future  - TSH WITH REFLEX TO FT4; Future  - VITAMIN D,25 HYDROXY (DEFICIENCY); Future      HCM: Discussed and reviewed today. See above.  Labs per orders.  Vaccinations per orders.  Counseling about diet, supplements, exercise, skin care and safe sex.    Follow-up: Return in about 1 year (around 3/9/2024), or if symptoms worsen or fail to improve, for annual .      Please note that this dictation was created using voice recognition software. I have made every reasonable attempt to correct obvious errors, but I expect that there are errors of grammar and possibly content that I did not discover before finalizing the note.

## 2023-03-20 ENCOUNTER — OFFICE VISIT (OUTPATIENT)
Dept: URGENT CARE | Facility: CLINIC | Age: 33
End: 2023-03-20
Payer: COMMERCIAL

## 2023-03-20 VITALS
WEIGHT: 215 LBS | OXYGEN SATURATION: 97 % | HEART RATE: 54 BPM | HEIGHT: 73 IN | DIASTOLIC BLOOD PRESSURE: 88 MMHG | SYSTOLIC BLOOD PRESSURE: 136 MMHG | RESPIRATION RATE: 16 BRPM | BODY MASS INDEX: 28.49 KG/M2 | TEMPERATURE: 97.3 F

## 2023-03-20 DIAGNOSIS — J02.9 PHARYNGITIS, UNSPECIFIED ETIOLOGY: ICD-10-CM

## 2023-03-20 LAB — S PYO DNA SPEC NAA+PROBE: NOT DETECTED

## 2023-03-20 PROCEDURE — 87651 STREP A DNA AMP PROBE: CPT | Performed by: NURSE PRACTITIONER

## 2023-03-20 PROCEDURE — 99214 OFFICE O/P EST MOD 30 MIN: CPT | Performed by: NURSE PRACTITIONER

## 2023-03-20 RX ORDER — LIDOCAINE HYDROCHLORIDE 20 MG/ML
15 SOLUTION OROPHARYNGEAL
Qty: 100 ML | Refills: 0 | Status: SHIPPED | OUTPATIENT
Start: 2023-03-20 | End: 2023-04-30

## 2023-03-20 ASSESSMENT — VISUAL ACUITY: OU: 1

## 2023-03-20 ASSESSMENT — ENCOUNTER SYMPTOMS
FEVER: 1
TROUBLE SWALLOWING: 1
RESPIRATORY NEGATIVE: 1
SORE THROAT: 1

## 2023-03-20 ASSESSMENT — FIBROSIS 4 INDEX: FIB4 SCORE: 0.87

## 2023-03-21 NOTE — PROGRESS NOTES
"Subjective:     Waldemar Moreno is a 32 y.o. male who presents for Pharyngitis (Started last week )       Pharyngitis   This is a new problem. Episode onset: 1 week ago. The problem has been waxing and waning. The pain is moderate. Associated symptoms include congestion, ear pain and trouble swallowing. He has tried NSAIDs (TheraFlu) for the symptoms. The treatment provided no relief.     Review of Systems   Constitutional:  Positive for fever.   HENT:  Positive for congestion, ear pain, sore throat and trouble swallowing.    Respiratory: Negative.     All other systems reviewed and are negative.    Refer to HPI for additional details.    During this visit, appropriate PPE was worn, hand hygiene was performed, and the patient and any visitors were masked.    PMH:  has a past medical history of COVID.    He has no past medical history of Anxiety, Depression, or Hypertension.    MEDS:   Current Outpatient Medications:     lidocaine (XYLOCAINE) 2 % Solution, Take 15 mL by mouth every 3 hours as needed for Throat/Mouth Pain. Swish/gargle well, then spit out, Disp: 100 mL, Rfl: 0    multivitamin Tab, Take 1 Tablet by mouth every day., Disp: , Rfl:     VITAMIN D, CHOLECALCIFEROL, PO, Take  by mouth every day., Disp: , Rfl:     Omega-3 Fatty Acids (FISH OIL PO), Take 2 Tablets by mouth every day., Disp: , Rfl:     ALLERGIES: No Known Allergies  SURGHX: History reviewed. No pertinent surgical history.  SOCHX:  reports that he has never smoked. He has never used smokeless tobacco. He reports current alcohol use. He reports that he does not use drugs.    FH: Per HPI as applicable/pertinent.      Objective:     /88 (BP Location: Left arm, Patient Position: Sitting, BP Cuff Size: Adult)   Pulse (!) 54   Temp 36.3 °C (97.3 °F) (Temporal)   Resp 16   Ht 1.854 m (6' 1\")   Wt 97.5 kg (215 lb)   SpO2 97%   BMI 28.37 kg/m²     Physical Exam  Nursing note reviewed.   Constitutional:       General: He is not in acute " distress.     Appearance: He is well-developed. He is not ill-appearing or toxic-appearing.   HENT:      Right Ear: Tympanic membrane normal.      Left Ear: Tympanic membrane normal.      Nose: Congestion and rhinorrhea present. Rhinorrhea is clear.      Mouth/Throat:      Mouth: Mucous membranes are moist.      Pharynx: Oropharynx is clear. Uvula midline. Posterior oropharyngeal erythema (Mild) present. No pharyngeal swelling or oropharyngeal exudate.   Eyes:      General: Vision grossly intact.   Cardiovascular:      Rate and Rhythm: Normal rate.   Pulmonary:      Effort: Pulmonary effort is normal. No respiratory distress.      Comments: Phonation normal, speaks in full sentences, no audible wheeze or stridor   Musculoskeletal:         General: No deformity. Normal range of motion.   Skin:     General: Skin is warm and dry.      Coloration: Skin is not pale.   Neurological:      Mental Status: He is alert and oriented to person, place, and time.      Motor: No weakness.   Psychiatric:         Behavior: Behavior normal. Behavior is cooperative.     POCT Cepheid Group A Strep by PCR: negative      Assessment/Plan:     1. Pharyngitis, unspecified etiology  - POCT GROUP A STREP, PCR  - lidocaine (XYLOCAINE) 2 % Solution; Take 15 mL by mouth every 3 hours as needed for Throat/Mouth Pain. Swish/gargle well, then spit out  Dispense: 100 mL; Refill: 0    Discussed likely self-limiting viral etiology and expected course and duration of illness. Vital signs stable, afebrile, no acute distress at this time.     Rx as above sent electronically. Patient elects to continue with ibuprofen PRN for pain/inflammation. Advised he may use 800 mg every 8 hours PRN. May alternate with APAP.    Vital signs stable, afebrile, no acute distress at this time. Warning signs reviewed. Return precautions discussed.     Differential diagnosis, natural history, supportive care, over-the-counter symptom management per 's  instructions, close monitoring, and indications for immediate follow-up discussed.     All questions answered. Patient agrees with the plan of care.    Discharge summary provided via [employer].    Billing note: 30 minutes was allotted and spent for patient care and coordination of care (not reported separately) including preparing for the visit, obtaining/reviewing history from/with patient/, performing an exam/evaluation, ordering Rx, developing a plan of care, counseling/educating the patient, developing the discharge summary for release to [employer], additional counseling, and documentation. This template was updated to summarize care specific to this encounter. Established patient. [id number]. Please refer to the chart for additional details on the care provided.

## 2023-03-23 ENCOUNTER — APPOINTMENT (OUTPATIENT)
Dept: LAB | Facility: MEDICAL CENTER | Age: 33
End: 2023-03-23
Attending: NURSE PRACTITIONER
Payer: COMMERCIAL

## 2023-04-20 ENCOUNTER — HOSPITAL ENCOUNTER (OUTPATIENT)
Dept: LAB | Facility: MEDICAL CENTER | Age: 33
End: 2023-04-20
Attending: NURSE PRACTITIONER
Payer: COMMERCIAL

## 2023-04-20 ENCOUNTER — PATIENT MESSAGE (OUTPATIENT)
Dept: MEDICAL GROUP | Facility: PHYSICIAN GROUP | Age: 33
End: 2023-04-20

## 2023-04-20 DIAGNOSIS — Z13.0 SCREENING FOR ENDOCRINE, METABOLIC AND IMMUNITY DISORDER: ICD-10-CM

## 2023-04-20 DIAGNOSIS — D69.6 THROMBOCYTOPENIA (HCC): ICD-10-CM

## 2023-04-20 DIAGNOSIS — Z13.228 SCREENING FOR ENDOCRINE, METABOLIC AND IMMUNITY DISORDER: ICD-10-CM

## 2023-04-20 DIAGNOSIS — Z11.59 NEED FOR HEPATITIS C SCREENING TEST: ICD-10-CM

## 2023-04-20 DIAGNOSIS — Z13.220 SCREENING FOR LIPID DISORDERS: ICD-10-CM

## 2023-04-20 DIAGNOSIS — Z13.29 SCREENING FOR ENDOCRINE, METABOLIC AND IMMUNITY DISORDER: ICD-10-CM

## 2023-04-20 LAB
25(OH)D3 SERPL-MCNC: 38 NG/ML (ref 30–100)
ALBUMIN SERPL BCP-MCNC: 4.4 G/DL (ref 3.2–4.9)
ALBUMIN/GLOB SERPL: 1.6 G/DL
ALP SERPL-CCNC: 78 U/L (ref 30–99)
ALT SERPL-CCNC: 22 U/L (ref 2–50)
ANION GAP SERPL CALC-SCNC: 10 MMOL/L (ref 7–16)
AST SERPL-CCNC: 23 U/L (ref 12–45)
BASOPHILS # BLD AUTO: 0.5 % (ref 0–1.8)
BASOPHILS # BLD: 0.03 K/UL (ref 0–0.12)
BILIRUB SERPL-MCNC: 0.6 MG/DL (ref 0.1–1.5)
BUN SERPL-MCNC: 21 MG/DL (ref 8–22)
CALCIUM ALBUM COR SERPL-MCNC: 9.1 MG/DL (ref 8.5–10.5)
CALCIUM SERPL-MCNC: 9.4 MG/DL (ref 8.4–10.2)
CHLORIDE SERPL-SCNC: 103 MMOL/L (ref 96–112)
CHOLEST SERPL-MCNC: 170 MG/DL (ref 100–199)
CO2 SERPL-SCNC: 25 MMOL/L (ref 20–33)
CREAT SERPL-MCNC: 1.06 MG/DL (ref 0.5–1.4)
EOSINOPHIL # BLD AUTO: 0.18 K/UL (ref 0–0.51)
EOSINOPHIL NFR BLD: 3.3 % (ref 0–6.9)
ERYTHROCYTE [DISTWIDTH] IN BLOOD BY AUTOMATED COUNT: 42.4 FL (ref 35.9–50)
FASTING STATUS PATIENT QL REPORTED: NORMAL
GFR SERPLBLD CREATININE-BSD FMLA CKD-EPI: 95 ML/MIN/1.73 M 2
GLOBULIN SER CALC-MCNC: 2.8 G/DL (ref 1.9–3.5)
GLUCOSE SERPL-MCNC: 91 MG/DL (ref 65–99)
HCT VFR BLD AUTO: 45.1 % (ref 42–52)
HCV AB SER QL: NORMAL
HDLC SERPL-MCNC: 53 MG/DL
HGB BLD-MCNC: 14.9 G/DL (ref 14–18)
IMM GRANULOCYTES # BLD AUTO: 0.04 K/UL (ref 0–0.11)
IMM GRANULOCYTES NFR BLD AUTO: 0.7 % (ref 0–0.9)
LDLC SERPL CALC-MCNC: 99 MG/DL
LYMPHOCYTES # BLD AUTO: 2.77 K/UL (ref 1–4.8)
LYMPHOCYTES NFR BLD: 50.7 % (ref 22–41)
MCH RBC QN AUTO: 30.5 PG (ref 27–33)
MCHC RBC AUTO-ENTMCNC: 33 G/DL (ref 33.7–35.3)
MCV RBC AUTO: 92.4 FL (ref 81.4–97.8)
MONOCYTES # BLD AUTO: 0.47 K/UL (ref 0–0.85)
MONOCYTES NFR BLD AUTO: 8.6 % (ref 0–13.4)
NEUTROPHILS # BLD AUTO: 1.97 K/UL (ref 1.82–7.42)
NEUTROPHILS NFR BLD: 36.2 % (ref 44–72)
NRBC # BLD AUTO: 0 K/UL
NRBC BLD-RTO: 0 /100 WBC
PLATELET # BLD AUTO: 152 K/UL (ref 164–446)
PMV BLD AUTO: 12.1 FL (ref 9–12.9)
POTASSIUM SERPL-SCNC: 4.3 MMOL/L (ref 3.6–5.5)
PROT SERPL-MCNC: 7.2 G/DL (ref 6–8.2)
RBC # BLD AUTO: 4.88 M/UL (ref 4.7–6.1)
SODIUM SERPL-SCNC: 138 MMOL/L (ref 135–145)
TRIGL SERPL-MCNC: 92 MG/DL (ref 0–149)
TSH SERPL DL<=0.005 MIU/L-ACNC: 3.78 UIU/ML (ref 0.38–5.33)
WBC # BLD AUTO: 5.5 K/UL (ref 4.8–10.8)

## 2023-04-20 PROCEDURE — 82306 VITAMIN D 25 HYDROXY: CPT

## 2023-04-20 PROCEDURE — 85025 COMPLETE CBC W/AUTO DIFF WBC: CPT

## 2023-04-20 PROCEDURE — 80061 LIPID PANEL: CPT

## 2023-04-20 PROCEDURE — 86803 HEPATITIS C AB TEST: CPT

## 2023-04-20 PROCEDURE — 80053 COMPREHEN METABOLIC PANEL: CPT

## 2023-04-20 PROCEDURE — 84443 ASSAY THYROID STIM HORMONE: CPT

## 2023-04-20 PROCEDURE — 36415 COLL VENOUS BLD VENIPUNCTURE: CPT

## 2023-04-26 ENCOUNTER — PATIENT MESSAGE (OUTPATIENT)
Dept: HEALTH INFORMATION MANAGEMENT | Facility: OTHER | Age: 33
End: 2023-04-26

## 2023-04-28 NOTE — PROGRESS NOTES
Recent labs show platelet level of 152.  He is not having any signs or symptoms of bleeding.  Plan to recheck in 1 month.

## 2023-04-30 ENCOUNTER — OFFICE VISIT (OUTPATIENT)
Dept: URGENT CARE | Facility: CLINIC | Age: 33
End: 2023-04-30
Payer: COMMERCIAL

## 2023-04-30 VITALS
RESPIRATION RATE: 16 BRPM | HEIGHT: 73 IN | BODY MASS INDEX: 28.49 KG/M2 | OXYGEN SATURATION: 98 % | TEMPERATURE: 98.1 F | SYSTOLIC BLOOD PRESSURE: 124 MMHG | DIASTOLIC BLOOD PRESSURE: 82 MMHG | WEIGHT: 215 LBS | HEART RATE: 65 BPM

## 2023-04-30 DIAGNOSIS — J40 BRONCHITIS: ICD-10-CM

## 2023-04-30 DIAGNOSIS — R05.8 POST-VIRAL COUGH SYNDROME: ICD-10-CM

## 2023-04-30 PROCEDURE — 99214 OFFICE O/P EST MOD 30 MIN: CPT | Performed by: NURSE PRACTITIONER

## 2023-04-30 RX ORDER — PREDNISONE 20 MG/1
60 TABLET ORAL DAILY
Qty: 15 TABLET | Refills: 0 | Status: SHIPPED | OUTPATIENT
Start: 2023-04-30 | End: 2023-05-05

## 2023-04-30 RX ORDER — ALBUTEROL SULFATE 90 UG/1
1-2 AEROSOL, METERED RESPIRATORY (INHALATION) EVERY 4 HOURS PRN
Qty: 8 G | Refills: 0 | Status: SHIPPED | OUTPATIENT
Start: 2023-04-30

## 2023-04-30 RX ORDER — BENZONATATE 200 MG/1
200 CAPSULE ORAL EVERY 8 HOURS PRN
Qty: 30 CAPSULE | Refills: 0 | Status: SHIPPED | OUTPATIENT
Start: 2023-04-30

## 2023-04-30 ASSESSMENT — ENCOUNTER SYMPTOMS
COUGH: 1
HEARTBURN: 0
CONSTITUTIONAL NEGATIVE: 1
FEVER: 0
SHORTNESS OF BREATH: 0
CARDIOVASCULAR NEGATIVE: 1

## 2023-04-30 ASSESSMENT — FIBROSIS 4 INDEX: FIB4 SCORE: 1.03

## 2023-04-30 ASSESSMENT — VISUAL ACUITY: OU: 1

## 2023-04-30 NOTE — LETTER
April 30, 2023         Patient: Waldemar Ohara   YOB: 1990   Date of Visit: 4/30/2023           To Whom it May Concern:    Waldemar Ohara was seen in my clinic on 4/30/2023 due to illness. Due to medical necessity, please excuse patient from work 4/30/2023.     If you have any questions or concerns, please don't hesitate to call.        Sincerely,         TRISTAN Claudio.  Electronically Signed

## 2023-05-01 NOTE — PROGRESS NOTES
Subjective:     Waldemar Ohara is a 32 y.o. male who presents for Cough (Started 7 weeks ago )       Cough  This is a new problem. The problem has been unchanged. The cough is Non-productive. Pertinent negatives include no fever, heartburn or shortness of breath. There is no history of asthma or environmental allergies.     Seen in urgent care 3/20/2023 for viral URI.    Patient reports most of his symptoms have improved.  However, continues to have dry cough all day and all night.  Keeps him up at night time.  No other symptoms.    Review of Systems   Constitutional: Negative.  Negative for fever.   HENT: Negative.     Respiratory:  Positive for cough. Negative for shortness of breath.    Cardiovascular: Negative.    Gastrointestinal:  Negative for heartburn.   Endo/Heme/Allergies:  Negative for environmental allergies.   All other systems reviewed and are negative.    Refer to HPI for additional details.    During this visit, appropriate PPE was worn, hand hygiene was performed, and the patient and any visitors were masked.    PMH:  has a past medical history of COVID.    He has no past medical history of Anxiety, Depression, or Hypertension.    MEDS:   Current Outpatient Medications:     predniSONE (DELTASONE) 20 MG Tab, Take 3 Tablets by mouth every day for 5 days., Disp: 15 Tablet, Rfl: 0    albuterol 108 (90 Base) MCG/ACT Aero Soln inhalation aerosol, Inhale 1-2 Puffs every four hours as needed for Shortness of Breath (and/or wheezing)., Disp: 8 g, Rfl: 0    benzonatate (TESSALON) 200 MG capsule, Take 1 Capsule by mouth every 8 hours as needed for Cough., Disp: 30 Capsule, Rfl: 0    multivitamin Tab, Take 1 Tablet by mouth every day., Disp: , Rfl:     VITAMIN D, CHOLECALCIFEROL, PO, Take  by mouth every day., Disp: , Rfl:     Omega-3 Fatty Acids (FISH OIL PO), Take 2 Tablets by mouth every day., Disp: , Rfl:     ALLERGIES: No Known Allergies  SURGHX: History reviewed. No pertinent surgical  "history.  SOCHX:  reports that he has never smoked. He has never used smokeless tobacco. He reports current alcohol use. He reports that he does not use drugs.    FH: Per HPI as applicable/pertinent.      Objective:     /82 (BP Location: Left arm, Patient Position: Sitting, BP Cuff Size: Adult)   Pulse 65   Temp 36.7 °C (98.1 °F) (Temporal)   Resp 16   Ht 1.854 m (6' 1\")   Wt 97.5 kg (215 lb)   SpO2 98%   BMI 28.37 kg/m²     Physical Exam  Nursing note reviewed.   Constitutional:       General: He is not in acute distress.     Appearance: He is well-developed. He is not ill-appearing or toxic-appearing.   HENT:      Nose: Nose normal.   Eyes:      General: Vision grossly intact.      Extraocular Movements: Extraocular movements intact.   Cardiovascular:      Rate and Rhythm: Normal rate and regular rhythm.      Heart sounds: Normal heart sounds.   Pulmonary:      Effort: Pulmonary effort is normal. No respiratory distress.      Breath sounds: No stridor. Rhonchi (Small) present. No decreased breath sounds, wheezing or rales.   Musculoskeletal:         General: No deformity. Normal range of motion.      Cervical back: Normal range of motion.   Skin:     General: Skin is warm and dry.      Capillary Refill: Capillary refill takes less than 2 seconds.      Coloration: Skin is not pale.   Neurological:      Mental Status: He is alert and oriented to person, place, and time.      Motor: No weakness.   Psychiatric:         Behavior: Behavior normal. Behavior is cooperative.       Assessment/Plan:     1. Post-viral cough syndrome  - predniSONE (DELTASONE) 20 MG Tab; Take 3 Tablets by mouth every day for 5 days.  Dispense: 15 Tablet; Refill: 0  - albuterol 108 (90 Base) MCG/ACT Aero Soln inhalation aerosol; Inhale 1-2 Puffs every four hours as needed for Shortness of Breath (and/or wheezing).  Dispense: 8 g; Refill: 0  - benzonatate (TESSALON) 200 MG capsule; Take 1 Capsule by mouth every 8 hours as needed for " Cough.  Dispense: 30 Capsule; Refill: 0    2. Bronchitis  - predniSONE (DELTASONE) 20 MG Tab; Take 3 Tablets by mouth every day for 5 days.  Dispense: 15 Tablet; Refill: 0  - albuterol 108 (90 Base) MCG/ACT Aero Soln inhalation aerosol; Inhale 1-2 Puffs every four hours as needed for Shortness of Breath (and/or wheezing).  Dispense: 8 g; Refill: 0    Rx as above sent electronically.    Vital signs stable, afebrile, no acute distress at this time. Monitor symptoms.  Follow-up with PCP.  Return precautions advised.  May need to refer to pulmonology if symptoms persist.    Differential diagnosis, natural history, supportive care, over-the-counter symptom management per 's instructions, close monitoring, and indications for immediate follow-up discussed.     All questions answered. Patient agrees with the plan of care.    Work note provided.     Billing note: 30 minutes was allotted and spent for patient care and coordination of care (not reported separately) including preparing for the visit, obtaining/reviewing history from/with patient, performing an exam/evaluation, ordering Rx, developing a plan of care, counseling/educating the patient, developing the discharge summary for release to [facility], producing a work note, updating the medical record, and documentation. This template was updated to summarize care specific to this encounter. Established patient. [id number]. Please refer to the chart for additional details on the care provided.

## 2023-05-05 ENCOUNTER — OFFICE VISIT (OUTPATIENT)
Dept: URGENT CARE | Facility: CLINIC | Age: 33
End: 2023-05-05
Payer: COMMERCIAL

## 2023-05-05 ENCOUNTER — APPOINTMENT (OUTPATIENT)
Dept: RADIOLOGY | Facility: IMAGING CENTER | Age: 33
End: 2023-05-05
Payer: COMMERCIAL

## 2023-05-05 VITALS
DIASTOLIC BLOOD PRESSURE: 76 MMHG | HEART RATE: 74 BPM | OXYGEN SATURATION: 97 % | HEIGHT: 73 IN | RESPIRATION RATE: 18 BRPM | SYSTOLIC BLOOD PRESSURE: 126 MMHG | BODY MASS INDEX: 28.49 KG/M2 | WEIGHT: 215 LBS | TEMPERATURE: 99.5 F

## 2023-05-05 DIAGNOSIS — R05.1 ACUTE COUGH: ICD-10-CM

## 2023-05-05 PROCEDURE — 71046 X-RAY EXAM CHEST 2 VIEWS: CPT | Mod: TC,FY

## 2023-05-05 PROCEDURE — 99214 OFFICE O/P EST MOD 30 MIN: CPT

## 2023-05-05 ASSESSMENT — ENCOUNTER SYMPTOMS
COUGH: 1
WEIGHT LOSS: 0
SHORTNESS OF BREATH: 0
FEVER: 0
HEMOPTYSIS: 0

## 2023-05-05 ASSESSMENT — FIBROSIS 4 INDEX: FIB4 SCORE: 1.03

## 2023-05-06 NOTE — PATIENT INSTRUCTIONS
"As we discussed your clinical condition would benefit from over-the-counter remedies:    FLONASE (once per day)  You may consider intranasal steroids such as fluticasone (brand name Flonase), (other options include Nasonex, Rhinocort, Nasacort) daily; continue for at least 2-3 weeks. Any generic should work as well but may cause slightly more nasal irritation. Please take according to package directions.  This steroid will help reduce inflammation of your sinuses.  This is the number one medication to help with seasonal allergies and treat nasal inflammation.  Cost: around $8 at [facility] for the generic fluticasone [facility] product (as of 5/20).    ANTIHISTAMINES  You may take a non-sedating antihistamine like Zyrtec (cetirizine) , Allegra (fexofenadine), Xyzal (levocetirizine), or Claritin (loratadine).  This will help \"dry you out\" and reduce the amount of nasal inflammation.  Follow package directions paying attention to whether they are once or twice daily.  Note: if there is a \"D\" such lemuel Allegra-D it also contains a decongestant such as sudafed.  Some patients benefit from alternating these medications every few weeks but literature does not support this.   Cost: around $11 at [employer] for the generic cetirizine [facility] branded product (as of 5/20)    "

## 2023-05-06 NOTE — PROGRESS NOTES
"Subjective:     Waldemar Ohara is a 32 y.o. male who presents for Cough (X 9 weeks,  lingering cough, was seen for post-viral cough syndrome, not getting better with medication.)      Patient endorses that he had a viral URI about eight weeks ago and all symptoms have improved since the onset of symptoms. He was seen in the urgent care twice for this cough. His cough is worse at night and is exacerbated with strenuous exercise. On a daily basis, he reports coughing 2-3 times per hour. He endorses \"coughing attacks\" with intermittent wheezing. The cough wakes him up from sleep nightly since symptom onset.  The problem has been gradually improving, although the patient feels that the cough is intrusive the problem occurs hourly. The cough is Non-productive. Pertinent negatives include no chest pain, fever, hemoptysis, nasal congestion, postnasal drip, shortness of breath, sweats or weight loss. Nothing aggravates the symptoms. He has tried oral steroids, a beta-agonist inhaler and prescription cough suppressant (Tessalon Perles) for the symptoms. The treatment provided mild relief. There is no history of asthma, bronchitis, COPD, environmental allergies or pneumonia.    Review of Systems   Constitutional:  Negative for fever and weight loss.   Respiratory:  Positive for cough. Negative for hemoptysis and shortness of breath.    Cardiovascular:  Negative for chest pain.   Endo/Heme/Allergies:  Negative for environmental allergies.     PMH:   Past Medical History:   Diagnosis Date    COVID      ALLERGIES: No Known Allergies  SURGHX: History reviewed. No pertinent surgical history.  SOCHX:   Social History     Socioeconomic History    Marital status: Single    Highest education level: Associate degree: academic program   Tobacco Use    Smoking status: Never    Smokeless tobacco: Never   Vaping Use    Vaping Use: Never used   Substance and Sexual Activity    Alcohol use: Yes     Comment: occ    Drug use: Never    " "Sexual activity: Yes     Partners: Female     Birth control/protection: None     Social Determinants of Health     Financial Resource Strain: Low Risk     Difficulty of Paying Living Expenses: Not very hard   Food Insecurity: No Food Insecurity    Worried About Running Out of Food in the Last Year: Never true    Ran Out of Food in the Last Year: Never true   Transportation Needs: No Transportation Needs    Lack of Transportation (Medical): No    Lack of Transportation (Non-Medical): No   Physical Activity: Sufficiently Active    Days of Exercise per Week: 4 days    Minutes of Exercise per Session: 60 min   Stress: No Stress Concern Present    Feeling of Stress : Not at all   Social Connections: Moderately Isolated    Frequency of Communication with Friends and Family: Once a week    Frequency of Social Gatherings with Friends and Family: Once a week    Attends Temple Services: 1 to 4 times per year    Active Member of Clubs or Organizations: No    Attends Club or Organization Meetings: Never    Marital Status: Living with partner   Housing Stability: Low Risk     Unable to Pay for Housing in the Last Year: No    Number of Places Lived in the Last Year: 2    Unstable Housing in the Last Year: No     FH:   Family History   Problem Relation Age of Onset    No Known Problems Mother     Hypertension Father     Hyperlipidemia Father     Hypertension Brother     Obesity Brother     No Known Problems Maternal Grandmother     No Known Problems Maternal Grandfather     No Known Problems Paternal Grandmother     No Known Problems Paternal Grandfather          Objective:   /76   Pulse 74   Temp 37.5 °C (99.5 °F) (Temporal)   Resp 18   Ht 1.854 m (6' 1\")   Wt 97.5 kg (215 lb)   SpO2 97%   BMI 28.37 kg/m²     Physical Exam  Vitals and nursing note reviewed.   Constitutional:       Appearance: Normal appearance. He is normal weight.   HENT:      Head: Normocephalic and atraumatic.      Right Ear: External ear " normal.      Left Ear: External ear normal.      Nose: Nose normal.      Mouth/Throat:      Mouth: Mucous membranes are moist.   Eyes:      Extraocular Movements: Extraocular movements intact.      Pupils: Pupils are equal, round, and reactive to light.   Cardiovascular:      Rate and Rhythm: Normal rate and regular rhythm.      Pulses: Normal pulses.      Heart sounds: Normal heart sounds.   Pulmonary:      Effort: Pulmonary effort is normal.      Breath sounds: Normal breath sounds.   Abdominal:      General: Abdomen is flat.   Musculoskeletal:         General: Normal range of motion.      Cervical back: Normal range of motion and neck supple.   Skin:     General: Skin is warm and dry.   Neurological:      General: No focal deficit present.      Mental Status: He is alert and oriented to person, place, and time. Mental status is at baseline.   Psychiatric:         Mood and Affect: Mood normal.         Behavior: Behavior normal.         Thought Content: Thought content normal.         Judgment: Judgment normal.       RADIOLOGY RESULTS   DX-CHEST-2 VIEWS    Result Date: 5/5/2023 5/5/2023 6:58 PM HISTORY/REASON FOR EXAM:  Cough; 8 weeks dry cough. TECHNIQUE/EXAM DESCRIPTION AND NUMBER OF VIEWS: Two views of the chest. COMPARISON:  11/18/2021 FINDINGS: Cardiomediastinal silhouette is normal. No focal consolidation, pleural effusion, pulmonary edema or pneumothorax. No acute osseous abnormality.     No acute cardiopulmonary abnormality.         Assessment/Plan:   Assessment      1. Acute cough  - DX-CHEST-2 VIEWS; Future     Based on patient's symptoms, symptom duration, and physical exam findings, patient likely suffering from postviral cough syndrome.  Chest x-ray negative in clinic.  Discussed differential diagnosis with patient including possible GERD versus environmental irritants triggering cough versus possible restrictive lung disorder, such as asthma.  Advised patient to start over-the-counter antihistamine  and Flonase for the next 7 days to determine whether or not this improves his symptoms.  Discussed lifestyle and diet modifications for possible GERD.  He will be following up with his primary care provider over the next 7 to 14 days for follow-up. All questions answered. Patient verbalized understanding and is in agreement with this plan of care.     If symptoms are worsening or not improving in 3-5 days, follow-up with PCP or return to UC. Differential diagnosis, natural history, and supportive care discussed. AVS handout given and reviewed with patient. Patient educated on red flags and when to seek treatment back in ED or UC.     I personally reviewed prior external notes and test results pertinent to today's visit.  I have independently reviewed and interpreted all diagnostics ordered during this urgent care visit.     This dictation has been created using voice recognition software. The accuracy of the dictation is limited by the abilities of the software. I expect there may be some errors of grammar and possibly content. I made every attempt to manually correct the errors within my dictation. However, errors related to voice recognition software may still exist and should be interpreted within the appropriate context.    This note was electronically signed by BRENTON Soria

## 2023-06-08 ENCOUNTER — HOSPITAL ENCOUNTER (OUTPATIENT)
Dept: LAB | Facility: MEDICAL CENTER | Age: 33
End: 2023-06-08
Attending: NURSE PRACTITIONER
Payer: COMMERCIAL

## 2023-06-08 DIAGNOSIS — D69.6 THROMBOCYTOPENIA (HCC): ICD-10-CM

## 2023-06-08 LAB
BASOPHILS # BLD AUTO: 0 % (ref 0–1.8)
BASOPHILS # BLD: 0 K/UL (ref 0–0.12)
EOSINOPHIL # BLD AUTO: 0.1 K/UL (ref 0–0.51)
EOSINOPHIL NFR BLD: 2 % (ref 0–6.9)
ERYTHROCYTE [DISTWIDTH] IN BLOOD BY AUTOMATED COUNT: 40.2 FL (ref 35.9–50)
HCT VFR BLD AUTO: 45.6 % (ref 42–52)
HGB BLD-MCNC: 15.3 G/DL (ref 14–18)
LYMPHOCYTES # BLD AUTO: 2.7 K/UL (ref 1–4.8)
LYMPHOCYTES NFR BLD: 52 % (ref 22–41)
MANUAL DIFF BLD: NORMAL
MCH RBC QN AUTO: 30.4 PG (ref 27–33)
MCHC RBC AUTO-ENTMCNC: 33.6 G/DL (ref 32.3–36.5)
MCV RBC AUTO: 90.5 FL (ref 81.4–97.8)
MONOCYTES # BLD AUTO: 0.42 K/UL (ref 0–0.85)
MONOCYTES NFR BLD AUTO: 8 % (ref 0–13.4)
NEUTROPHILS # BLD AUTO: 1.98 K/UL (ref 1.82–7.42)
NEUTROPHILS NFR BLD: 37 % (ref 44–72)
NEUTS BAND NFR BLD MANUAL: 1 % (ref 0–10)
NRBC # BLD AUTO: 0 K/UL
NRBC BLD-RTO: 0 /100 WBC (ref 0–0.2)
PLATELET # BLD AUTO: 159 K/UL (ref 164–446)
PLATELET BLD QL SMEAR: NORMAL
PMV BLD AUTO: 11.8 FL (ref 9–12.9)
RBC # BLD AUTO: 5.04 M/UL (ref 4.7–6.1)
RBC BLD AUTO: NORMAL
VARIANT LYMPHS BLD QL SMEAR: NORMAL
WBC # BLD AUTO: 5.2 K/UL (ref 4.8–10.8)

## 2023-06-08 PROCEDURE — 36415 COLL VENOUS BLD VENIPUNCTURE: CPT

## 2023-06-08 PROCEDURE — 85025 COMPLETE CBC W/AUTO DIFF WBC: CPT

## 2023-06-08 PROCEDURE — 85007 BL SMEAR W/DIFF WBC COUNT: CPT

## 2024-01-23 ENCOUNTER — OFFICE VISIT (OUTPATIENT)
Dept: URGENT CARE | Facility: CLINIC | Age: 34
End: 2024-01-23
Payer: COMMERCIAL

## 2024-01-23 VITALS
OXYGEN SATURATION: 97 % | RESPIRATION RATE: 16 BRPM | DIASTOLIC BLOOD PRESSURE: 96 MMHG | BODY MASS INDEX: 28.49 KG/M2 | TEMPERATURE: 98.3 F | HEART RATE: 76 BPM | SYSTOLIC BLOOD PRESSURE: 134 MMHG | WEIGHT: 215 LBS | HEIGHT: 73 IN

## 2024-01-23 DIAGNOSIS — R42 VERTIGO: ICD-10-CM

## 2024-01-23 DIAGNOSIS — B33.8 RSV INFECTION: ICD-10-CM

## 2024-01-23 LAB
FLUAV RNA SPEC QL NAA+PROBE: NEGATIVE
FLUBV RNA SPEC QL NAA+PROBE: NEGATIVE
RSV RNA SPEC QL NAA+PROBE: POSITIVE
SARS-COV-2 RNA RESP QL NAA+PROBE: NEGATIVE

## 2024-01-23 PROCEDURE — 0241U POCT CEPHEID COV-2, FLU A/B, RSV - PCR: CPT | Performed by: PHYSICIAN ASSISTANT

## 2024-01-23 PROCEDURE — 3080F DIAST BP >= 90 MM HG: CPT | Performed by: PHYSICIAN ASSISTANT

## 2024-01-23 PROCEDURE — 99213 OFFICE O/P EST LOW 20 MIN: CPT | Performed by: PHYSICIAN ASSISTANT

## 2024-01-23 PROCEDURE — 3075F SYST BP GE 130 - 139MM HG: CPT | Performed by: PHYSICIAN ASSISTANT

## 2024-01-23 RX ORDER — MECLIZINE HYDROCHLORIDE 25 MG/1
25 TABLET ORAL 3 TIMES DAILY PRN
Qty: 30 TABLET | Refills: 0 | Status: SHIPPED | OUTPATIENT
Start: 2024-01-23

## 2024-01-23 ASSESSMENT — FIBROSIS 4 INDEX: FIB4 SCORE: 1.02

## 2024-01-23 ASSESSMENT — ENCOUNTER SYMPTOMS
SORE THROAT: 1
FEVER: 1
DIZZINESS: 1
ABDOMINAL PAIN: 0
COUGH: 1
DIARRHEA: 0
VOMITING: 0
HEADACHES: 1
CHILLS: 1
MYALGIAS: 1
SHORTNESS OF BREATH: 0

## 2024-01-23 NOTE — PROGRESS NOTES
"Subjective     Waldemar Ohara is a 33 y.o. male who presents with Pharyngitis (X4 days, Congestion, cough, intermittent fever, body aches, dizziness with tilting of head. Recently exposed to RSV.\")    HPI:  Waldemar Ohara is a 33 y.o. male who presents today for evaluation of URI symptoms.  Patient reports he started with sore throat 4 days ago.  He says sore throat has mostly resolved but since that he has also developed cough, congestion, body aches, and fatigue.  He also notes that he gets dizzy when he moves his head certain positions.  He has had some mild subjective fever/chills and body aches but he feels as though those were more prevalent in the first few days of his symptoms.  He has not felt any subjective fever today.  He does note that his significant other tested positive for RSV last week in the emergency department.  No other sick contacts that he is aware of.        Review of Systems   Constitutional:  Positive for chills, fever and malaise/fatigue.   HENT:  Positive for congestion and sore throat.    Respiratory:  Positive for cough. Negative for shortness of breath.    Gastrointestinal:  Negative for abdominal pain, diarrhea and vomiting.   Musculoskeletal:  Positive for myalgias.   Neurological:  Positive for dizziness and headaches.           PMH:  has a past medical history of COVID.    He has no past medical history of Anxiety, Depression, or Hypertension.  MEDS:   Current Outpatient Medications:     meclizine (ANTIVERT) 25 MG Tab, Take 1 Tablet by mouth 3 times a day as needed for Dizziness or Vertigo., Disp: 30 Tablet, Rfl: 0    multivitamin Tab, Take 1 Tablet by mouth every day., Disp: , Rfl:     VITAMIN D, CHOLECALCIFEROL, PO, Take  by mouth every day., Disp: , Rfl:     Omega-3 Fatty Acids (FISH OIL PO), Take 2 Tablets by mouth every day., Disp: , Rfl:     albuterol 108 (90 Base) MCG/ACT Aero Soln inhalation aerosol, Inhale 1-2 Puffs every four hours as needed for Shortness " "of Breath (and/or wheezing). (Patient not taking: Reported on 5/5/2023), Disp: 8 g, Rfl: 0    benzonatate (TESSALON) 200 MG capsule, Take 1 Capsule by mouth every 8 hours as needed for Cough. (Patient not taking: Reported on 1/23/2024), Disp: 30 Capsule, Rfl: 0  ALLERGIES: No Known Allergies  SURGHX: No past surgical history on file.  SOCHX:  reports that he has never smoked. He has never used smokeless tobacco. He reports current alcohol use. He reports that he does not use drugs.  FH: Family history was reviewed, no pertinent findings to report        Objective     BP (!) 134/96 (BP Location: Right arm, Patient Position: Sitting, BP Cuff Size: Adult long)   Pulse 76   Temp 36.8 °C (98.3 °F) (Temporal)   Resp 16   Ht 1.854 m (6' 1\")   Wt 97.5 kg (215 lb)   SpO2 97%   BMI 28.37 kg/m²      Physical Exam  Constitutional:       Appearance: He is well-developed.   HENT:      Head: Normocephalic and atraumatic.      Right Ear: Tympanic membrane, ear canal and external ear normal.      Left Ear: Tympanic membrane, ear canal and external ear normal.      Nose: Mucosal edema and congestion present. No rhinorrhea.      Mouth/Throat:      Lips: Pink.      Mouth: Mucous membranes are moist.      Pharynx: Oropharynx is clear.   Eyes:      Extraocular Movements: Extraocular movements intact.      Conjunctiva/sclera: Conjunctivae normal.      Pupils: Pupils are equal, round, and reactive to light.   Cardiovascular:      Rate and Rhythm: Normal rate and regular rhythm.      Heart sounds: Normal heart sounds. No murmur heard.  Pulmonary:      Effort: Pulmonary effort is normal.      Breath sounds: Normal breath sounds. No decreased breath sounds, wheezing, rhonchi or rales.   Musculoskeletal:      Cervical back: Normal range of motion.   Lymphadenopathy:      Cervical: No cervical adenopathy.   Skin:     General: Skin is warm and dry.      Capillary Refill: Capillary refill takes less than 2 seconds.   Neurological:      " Mental Status: He is alert and oriented to person, place, and time.   Psychiatric:         Behavior: Behavior normal.         Judgment: Judgment normal.       POCT CoV-2, Flu A/B, RSV by PCR - POSITIVE for RSV    Assessment & Plan       1. RSV infection  - POCT CoV-2, Flu A/B, RSV by PCR  - OTC cold/flu medications  -Supportive care also discussed to include the use of saline nasal rinses, steam inhalation, and the use of a cool-mist humidifier in the bedroom at night.  - PO fluids  - Rest  - Tylenol or ibuprofen as needed for fever > 100.4 F    2. Vertigo  - meclizine (ANTIVERT) 25 MG Tab; Take 1 Tablet by mouth 3 times a day as needed for Dizziness or Vertigo.  Dispense: 30 Tablet; Refill: 0               Differential Diagnosis, natural history, and supportive care discussed. Return to the Urgent Care or follow up with your PCP if symptoms fail to resolve, or for any new or worsening symptoms. Emergency room precautions discussed. Patient and/or family appears understanding of information.

## 2024-01-23 NOTE — LETTER
January 23, 2024         Patient: Waldemar Ohara   YOB: 1990   Date of Visit: 1/23/2024           To Whom it May Concern:    Waldemar Ohara was seen in my clinic on 1/23/2024.  Please excuse his absence from work for yesterday and today.      Sincerely,           Megan Olmedo P.A.-C.  Electronically Signed

## 2024-08-04 ENCOUNTER — OFFICE VISIT (OUTPATIENT)
Dept: URGENT CARE | Facility: CLINIC | Age: 34
End: 2024-08-04
Payer: COMMERCIAL

## 2024-08-04 ENCOUNTER — APPOINTMENT (OUTPATIENT)
Dept: URGENT CARE | Facility: CLINIC | Age: 34
End: 2024-08-04
Payer: COMMERCIAL

## 2024-08-04 VITALS
WEIGHT: 234 LBS | SYSTOLIC BLOOD PRESSURE: 132 MMHG | HEIGHT: 73 IN | RESPIRATION RATE: 16 BRPM | BODY MASS INDEX: 31.01 KG/M2 | HEART RATE: 73 BPM | DIASTOLIC BLOOD PRESSURE: 78 MMHG | OXYGEN SATURATION: 96 % | TEMPERATURE: 98.7 F

## 2024-08-04 DIAGNOSIS — R41.89 BRAIN FOG: ICD-10-CM

## 2024-08-04 LAB
FLUAV RNA SPEC QL NAA+PROBE: NEGATIVE
FLUBV RNA SPEC QL NAA+PROBE: NEGATIVE
RSV RNA SPEC QL NAA+PROBE: NEGATIVE
S PYO DNA SPEC NAA+PROBE: NOT DETECTED
SARS-COV-2 RNA RESP QL NAA+PROBE: NEGATIVE

## 2024-08-04 PROCEDURE — 87651 STREP A DNA AMP PROBE: CPT | Performed by: PHYSICIAN ASSISTANT

## 2024-08-04 PROCEDURE — 3078F DIAST BP <80 MM HG: CPT | Performed by: PHYSICIAN ASSISTANT

## 2024-08-04 PROCEDURE — 0241U POCT CEPHEID COV-2, FLU A/B, RSV - PCR: CPT | Performed by: PHYSICIAN ASSISTANT

## 2024-08-04 PROCEDURE — 3075F SYST BP GE 130 - 139MM HG: CPT | Performed by: PHYSICIAN ASSISTANT

## 2024-08-04 PROCEDURE — 99214 OFFICE O/P EST MOD 30 MIN: CPT | Performed by: PHYSICIAN ASSISTANT

## 2024-08-04 ASSESSMENT — FIBROSIS 4 INDEX: FIB4 SCORE: 1.05

## 2024-08-04 NOTE — PROGRESS NOTES
"Subjective:   Waldemar Ohara is a 34 y.o. male who presents for Other (Brain fog, hands were weak, lightheaded, throat feels dry x 3days)        Patient presents to clinic with concerns of fatigue and brain fog.  Symptoms started few days ago, seem to improve yesterday and worsened again today.  States that he feels \"lightheaded\" and is having difficulty focusing.  At work earlier today he felt like he was going to pass out, but did not ultimately do so.  Following the incident he states that his hands felt shaky and weak.  He denies congestion, but states that his throat feels dry and uncomfortable.  He has an occasional mild cough.  No chest pain, pain with breathing, shortness of breath, difficulty breathing, palpitations, neck pain, vision changes, nausea, vomiting, headaches.  States that he has similar symptoms in the past with COVID-19.  His girlfriend had COVID 3 weeks ago, but he developed similar symptoms immediately after so he suspects that he already have COVID.  He is not taking any medications for symptoms.  Patient states that he drinks plenty of water throughout the day and he believes that his fluid and food intake has been adequate.      Review of Systems   Constitutional:  Negative for chills and fever.   HENT:  Positive for sore throat. Negative for congestion.    Eyes:  Negative for blurred vision and double vision.   Respiratory:  Positive for cough. Negative for shortness of breath.    Cardiovascular:  Negative for chest pain, palpitations and leg swelling.   Neurological:  Positive for weakness. Negative for focal weakness and headaches.       PMH:  has a past medical history of COVID.    He has no past medical history of Anxiety, Depression, or Hypertension.  MEDS:   Current Outpatient Medications:     multivitamin Tab, Take 1 Tablet by mouth every day., Disp: , Rfl:     VITAMIN D, CHOLECALCIFEROL, PO, Take  by mouth every day., Disp: , Rfl:     meclizine (ANTIVERT) 25 MG Tab, Take " "1 Tablet by mouth 3 times a day as needed for Dizziness or Vertigo. (Patient not taking: Reported on 8/4/2024), Disp: 30 Tablet, Rfl: 0    albuterol 108 (90 Base) MCG/ACT Aero Soln inhalation aerosol, Inhale 1-2 Puffs every four hours as needed for Shortness of Breath (and/or wheezing). (Patient not taking: Reported on 5/5/2023), Disp: 8 g, Rfl: 0    benzonatate (TESSALON) 200 MG capsule, Take 1 Capsule by mouth every 8 hours as needed for Cough. (Patient not taking: Reported on 1/23/2024), Disp: 30 Capsule, Rfl: 0    Omega-3 Fatty Acids (FISH OIL PO), Take 2 Tablets by mouth every day. (Patient not taking: Reported on 8/4/2024), Disp: , Rfl:   ALLERGIES: No Known Allergies  SURGHX: No past surgical history on file.  SOCHX:  reports that he has never smoked. He has never used smokeless tobacco. He reports current alcohol use. He reports that he does not use drugs.  FH: Family history was reviewed, no pertinent findings to report   Objective:   /78   Pulse 73   Temp 37.1 °C (98.7 °F) (Temporal)   Resp 16   Ht 1.854 m (6' 1\")   Wt 106 kg (234 lb)   SpO2 96%   BMI 30.87 kg/m²   Physical Exam  Vitals reviewed.   Constitutional:       General: He is not in acute distress.     Appearance: Normal appearance. He is well-developed. He is not toxic-appearing.   HENT:      Head: Normocephalic and atraumatic.      Right Ear: External ear normal.      Left Ear: External ear normal.      Nose: Nose normal. No congestion or rhinorrhea.      Mouth/Throat:      Lips: Pink.      Mouth: Mucous membranes are moist.      Pharynx: Oropharynx is clear. Uvula midline.   Cardiovascular:      Rate and Rhythm: Normal rate and regular rhythm.      Heart sounds: Normal heart sounds, S1 normal and S2 normal.   Pulmonary:      Effort: Pulmonary effort is normal. No respiratory distress.      Breath sounds: Normal breath sounds. No stridor. No decreased breath sounds, wheezing, rhonchi or rales.   Skin:     General: Skin is dry. "   Neurological:      Comments: ANO x 3.  Cranial nerves II through XII intact.  Upper and lower extremity strength 5 out of 5 bilaterally.  Upper and lower extremity sensation intact and even bilaterally.  Negative Romberg.  Negative pronator drift.  Negative heel-to-shin testing.  Tandem walk normal-no ataxia.  Patellar reflexes 2+ bilaterally.   Psychiatric:         Speech: Speech normal.         Behavior: Behavior normal.           Results for orders placed or performed in visit on 08/04/24   POCT GROUP A STREP, PCR   Result Value Ref Range    POC Group A Strep, PCR Not Detected Not Detected, Invalid   POCT CoV-2, Flu A/B, RSV by PCR   Result Value Ref Range    SARS-CoV-2 by PCR Negative Negative, Invalid    Influenza virus A RNA Negative Negative, Invalid    Influenza virus B, PCR Negative Negative, Invalid    RSV, PCR Negative Negative, Invalid       Assessment/Plan:   1. Brain fog  - POCT GROUP A STREP, PCR  - POCT CoV-2, Flu A/B, RSV by PCR    Viral testing and testing for group A strep within normal limits.  The cause of patient's symptoms is unclear to me.  He is well-appearing on exam today and his vital signs are stable.  His neurological examination is grossly normal, which is reassuring.  He is not having any chest pain or shortness of breath to suggest cardiac pathology.  Low clinical suspicion for CVA/TIA, but this was considered.    Recommend that patient rest and ensure he is adequately hydrating.  Recommend that he switch to 50-50 water Gatorade mix.  If symptoms recur I recommend that he go to the emergency room for reevaluation.  Otherwise he should follow-up with his PCP early next week.

## 2024-08-05 ENCOUNTER — HOSPITAL ENCOUNTER (EMERGENCY)
Facility: MEDICAL CENTER | Age: 34
End: 2024-08-05
Attending: EMERGENCY MEDICINE
Payer: COMMERCIAL

## 2024-08-05 VITALS
WEIGHT: 231.92 LBS | DIASTOLIC BLOOD PRESSURE: 62 MMHG | HEIGHT: 73 IN | OXYGEN SATURATION: 99 % | TEMPERATURE: 98.2 F | SYSTOLIC BLOOD PRESSURE: 134 MMHG | HEART RATE: 53 BPM | BODY MASS INDEX: 30.74 KG/M2 | RESPIRATION RATE: 18 BRPM

## 2024-08-05 DIAGNOSIS — R42 LIGHTHEADEDNESS: ICD-10-CM

## 2024-08-05 LAB
ALBUMIN SERPL BCP-MCNC: 4.6 G/DL (ref 3.2–4.9)
ALBUMIN/GLOB SERPL: 1.6 G/DL
ALP SERPL-CCNC: 65 U/L (ref 30–99)
ALT SERPL-CCNC: 21 U/L (ref 2–50)
ANION GAP SERPL CALC-SCNC: 13 MMOL/L (ref 7–16)
AST SERPL-CCNC: 22 U/L (ref 12–45)
BASOPHILS # BLD AUTO: 0.2 % (ref 0–1.8)
BASOPHILS # BLD: 0.01 K/UL (ref 0–0.12)
BILIRUB SERPL-MCNC: 1.5 MG/DL (ref 0.1–1.5)
BUN SERPL-MCNC: 15 MG/DL (ref 8–22)
CALCIUM ALBUM COR SERPL-MCNC: 9.2 MG/DL (ref 8.5–10.5)
CALCIUM SERPL-MCNC: 9.7 MG/DL (ref 8.5–10.5)
CHLORIDE SERPL-SCNC: 102 MMOL/L (ref 96–112)
CO2 SERPL-SCNC: 22 MMOL/L (ref 20–33)
CREAT SERPL-MCNC: 1.11 MG/DL (ref 0.5–1.4)
EKG IMPRESSION: NORMAL
EOSINOPHIL # BLD AUTO: 0.01 K/UL (ref 0–0.51)
EOSINOPHIL NFR BLD: 0.2 % (ref 0–6.9)
ERYTHROCYTE [DISTWIDTH] IN BLOOD BY AUTOMATED COUNT: 39.6 FL (ref 35.9–50)
GFR SERPLBLD CREATININE-BSD FMLA CKD-EPI: 89 ML/MIN/1.73 M 2
GLOBULIN SER CALC-MCNC: 2.9 G/DL (ref 1.9–3.5)
GLUCOSE SERPL-MCNC: 105 MG/DL (ref 65–99)
HCT VFR BLD AUTO: 45.8 % (ref 42–52)
HGB BLD-MCNC: 15.3 G/DL (ref 14–18)
IMM GRANULOCYTES # BLD AUTO: 0.03 K/UL (ref 0–0.11)
IMM GRANULOCYTES NFR BLD AUTO: 0.6 % (ref 0–0.9)
LYMPHOCYTES # BLD AUTO: 1.18 K/UL (ref 1–4.8)
LYMPHOCYTES NFR BLD: 25.1 % (ref 22–41)
MCH RBC QN AUTO: 30.2 PG (ref 27–33)
MCHC RBC AUTO-ENTMCNC: 33.4 G/DL (ref 32.3–36.5)
MCV RBC AUTO: 90.3 FL (ref 81.4–97.8)
MONOCYTES # BLD AUTO: 0.26 K/UL (ref 0–0.85)
MONOCYTES NFR BLD AUTO: 5.5 % (ref 0–13.4)
NEUTROPHILS # BLD AUTO: 3.22 K/UL (ref 1.82–7.42)
NEUTROPHILS NFR BLD: 68.4 % (ref 44–72)
NRBC # BLD AUTO: 0 K/UL
NRBC BLD-RTO: 0 /100 WBC (ref 0–0.2)
PLATELET # BLD AUTO: 164 K/UL (ref 164–446)
PMV BLD AUTO: 11.8 FL (ref 9–12.9)
POTASSIUM SERPL-SCNC: 4.2 MMOL/L (ref 3.6–5.5)
PROT SERPL-MCNC: 7.5 G/DL (ref 6–8.2)
RBC # BLD AUTO: 5.07 M/UL (ref 4.7–6.1)
SODIUM SERPL-SCNC: 137 MMOL/L (ref 135–145)
WBC # BLD AUTO: 4.7 K/UL (ref 4.8–10.8)

## 2024-08-05 PROCEDURE — 36415 COLL VENOUS BLD VENIPUNCTURE: CPT

## 2024-08-05 PROCEDURE — 85025 COMPLETE CBC W/AUTO DIFF WBC: CPT

## 2024-08-05 PROCEDURE — 93005 ELECTROCARDIOGRAM TRACING: CPT | Performed by: EMERGENCY MEDICINE

## 2024-08-05 PROCEDURE — 93005 ELECTROCARDIOGRAM TRACING: CPT

## 2024-08-05 PROCEDURE — 80053 COMPREHEN METABOLIC PANEL: CPT

## 2024-08-05 PROCEDURE — 700105 HCHG RX REV CODE 258: Performed by: EMERGENCY MEDICINE

## 2024-08-05 PROCEDURE — 99283 EMERGENCY DEPT VISIT LOW MDM: CPT

## 2024-08-05 RX ORDER — SODIUM CHLORIDE 9 MG/ML
1000 INJECTION, SOLUTION INTRAVENOUS ONCE
Status: COMPLETED | OUTPATIENT
Start: 2024-08-05 | End: 2024-08-05

## 2024-08-05 RX ADMIN — SODIUM CHLORIDE 1000 ML: 9 INJECTION, SOLUTION INTRAVENOUS at 11:28

## 2024-08-05 ASSESSMENT — FIBROSIS 4 INDEX: FIB4 SCORE: 1.05

## 2024-08-05 ASSESSMENT — ENCOUNTER SYMPTOMS
BLURRED VISION: 0
FOCAL WEAKNESS: 0
HEADACHES: 0
PALPITATIONS: 0
SHORTNESS OF BREATH: 0
COUGH: 1
CHILLS: 0
WEAKNESS: 1
SORE THROAT: 1
DOUBLE VISION: 0
FEVER: 0

## 2024-08-05 NOTE — ED TRIAGE NOTES
"Waldemar Josh Ohara  34 y.o. male  Chief Complaint   Patient presents with    Lightheadedness     Since 8/1 that has increased over the past several days  Pt reports intermittent \"brain fog\" and near syncope        Pt amb to triage with steady gait for above complaint.   Pt is alert and oriented, speaking in full sentences, follows commands and responds appropriately to questions. Not in any apparent distress. Respirations are even and unlabored.  Pt placed in ED Lobby. Pt educated on triage process. Pt encouraged to alert staff for any changes.  EKG done prior to triage  "

## 2024-08-05 NOTE — Clinical Note
Waldemar Ohara was seen and treated in our emergency department on 8/5/2024.  He may return to work on 08/08/2024.       If you have any questions or concerns, please don't hesitate to call.      Flavio Adrian D.O.

## 2024-08-05 NOTE — ED PROVIDER NOTES
"ER Provider Note    Scribed for Dr. Flavio Adrian D.O. by Bean Joseph. 8/5/2024  10:49 AM    Primary Care Provider: BRENTON Somers    CHIEF COMPLAINT  Chief Complaint   Patient presents with    Lightheadedness     Since 8/1 that has increased over the past several days  Pt reports intermittent \"brain fog\" and near syncope      HPI/ROS    Waldemar Ohara is a 34 y.o. male who presents to the Emergency Department for evaluation of lightheadedness onset 2 days ago. The patient reports that around 5 days ago he began to feel brain fog and was unable to concentrate on simple tasks. He notes that it was manageable, but at work yesterday after standing for a long time he felt near syncopal, lightheaded, and had blurry vision. Patient adds that his hand strength became weakened at that time and he has a dry throat, but denies trouble walking, talking, or holding objects. Denies fever, chills, nausea, vomiting, or diarrhea. Patient states that he has had normal PO intake in this time    ROS as per HPI.    PAST MEDICAL HISTORY  Past Medical History:   Diagnosis Date    COVID      SURGICAL HISTORY  No past surgical history noted.    FAMILY HISTORY  Family History   Problem Relation Age of Onset    No Known Problems Mother     Hypertension Father     Hyperlipidemia Father     Hypertension Brother     Obesity Brother     No Known Problems Maternal Grandmother     No Known Problems Maternal Grandfather     No Known Problems Paternal Grandmother     No Known Problems Paternal Grandfather      SOCIAL HISTORY   reports that he has never smoked. He has never used smokeless tobacco. He reports current alcohol use. He reports that he does not use drugs.    CURRENT MEDICATIONS  Discharge Medication List as of 8/5/2024 12:48 PM        CONTINUE these medications which have NOT CHANGED    Details   multivitamin Tab Take 1 Tablet by mouth every day., Historical Med      VITAMIN D, CHOLECALCIFEROL, PO Take  by " "mouth every day., Historical Med      meclizine (ANTIVERT) 25 MG Tab Take 1 Tablet by mouth 3 times a day as needed for Dizziness or Vertigo., Disp-30 Tablet, R-0, Normal      albuterol 108 (90 Base) MCG/ACT Aero Soln inhalation aerosol Inhale 1-2 Puffs every four hours as needed for Shortness of Breath (and/or wheezing).May substitute for generic/name brand per insuranceDisp-8 g, R-0, Normal      benzonatate (TESSALON) 200 MG capsule Take 1 Capsule by mouth every 8 hours as needed for Cough., Disp-30 Capsule, R-0, Normal      Omega-3 Fatty Acids (FISH OIL PO) Take 2 Tablets by mouth every day., Historical Med           ALLERGIES  Patient has no known allergies.    PHYSICAL EXAM  BP (!) 156/85   Pulse 79   Temp 37.4 °C (99.3 °F) (Temporal)   Resp 16   Ht 1.854 m (6' 1\")   Wt 105 kg (231 lb 14.8 oz)   SpO2 98%   BMI 30.60 kg/m²     General: No acute distress.  HENT: Normocephalic, Mucus membranes are moist.   Chest: Lungs have even and unlabored respirations, Clear to auscultation.   Cardiovascular: Regular rate and regular rhythm, No peripheral cyanosis.  Abdomen: Non distended.  Neuro: Awake, Conversive, Able to relay recent events. Strength coordination of extremities is normal.  Psychiatric: Calm and cooperative.     EXTERNAL RECORDS REVIEWED  Review of patient's past medical records show that the patient was seen at Urgent Care yesterday for the same symptoms and was negative for COVID, Flu A/B, RSV, and Strep.     INITIAL ASSESSMENT  Patient has vague lightheaded sensations not changed by position or action, but mostly complains of dry throat. He had an episode of partial weakness of both hands which has since resolved. Will evaluate for electrolyte imbalance and anemia, as well as an EKG for cardiac arrhythmia. IV fluids will be given to treat his symptoms of lightheadedness.    ED Observation Status? No; Patient does not meet criteria for ED Observation.     DIAGNOSTIC STUDIES    Labs:   Results for " orders placed or performed during the hospital encounter of 08/05/24   CBC WITH DIFFERENTIAL   Result Value Ref Range    WBC 4.7 (L) 4.8 - 10.8 K/uL    RBC 5.07 4.70 - 6.10 M/uL    Hemoglobin 15.3 14.0 - 18.0 g/dL    Hematocrit 45.8 42.0 - 52.0 %    MCV 90.3 81.4 - 97.8 fL    MCH 30.2 27.0 - 33.0 pg    MCHC 33.4 32.3 - 36.5 g/dL    RDW 39.6 35.9 - 50.0 fL    Platelet Count 164 164 - 446 K/uL    MPV 11.8 9.0 - 12.9 fL    Neutrophils-Polys 68.40 44.00 - 72.00 %    Lymphocytes 25.10 22.00 - 41.00 %    Monocytes 5.50 0.00 - 13.40 %    Eosinophils 0.20 0.00 - 6.90 %    Basophils 0.20 0.00 - 1.80 %    Immature Granulocytes 0.60 0.00 - 0.90 %    Nucleated RBC 0.00 0.00 - 0.20 /100 WBC    Neutrophils (Absolute) 3.22 1.82 - 7.42 K/uL    Lymphs (Absolute) 1.18 1.00 - 4.80 K/uL    Monos (Absolute) 0.26 0.00 - 0.85 K/uL    Eos (Absolute) 0.01 0.00 - 0.51 K/uL    Baso (Absolute) 0.01 0.00 - 0.12 K/uL    Immature Granulocytes (abs) 0.03 0.00 - 0.11 K/uL    NRBC (Absolute) 0.00 K/uL   COMP METABOLIC PANEL   Result Value Ref Range    Sodium 137 135 - 145 mmol/L    Potassium 4.2 3.6 - 5.5 mmol/L    Chloride 102 96 - 112 mmol/L    Co2 22 20 - 33 mmol/L    Anion Gap 13.0 7.0 - 16.0    Glucose 105 (H) 65 - 99 mg/dL    Bun 15 8 - 22 mg/dL    Creatinine 1.11 0.50 - 1.40 mg/dL    Calcium 9.7 8.5 - 10.5 mg/dL    Correct Calcium 9.2 8.5 - 10.5 mg/dL    AST(SGOT) 22 12 - 45 U/L    ALT(SGPT) 21 2 - 50 U/L    Alkaline Phosphatase 65 30 - 99 U/L    Total Bilirubin 1.5 0.1 - 1.5 mg/dL    Albumin 4.6 3.2 - 4.9 g/dL    Total Protein 7.5 6.0 - 8.2 g/dL    Globulin 2.9 1.9 - 3.5 g/dL    A-G Ratio 1.6 g/dL   ESTIMATED GFR   Result Value Ref Range    GFR (CKD-EPI) 89 >60 mL/min/1.73 m 2   EKG   Result Value Ref Range    Report       [facility] Emergency Dept.    Test Date:  2024-08-05  Pt Name:    MAYO BOWSER       Department: ER  MRN:        [id number]                      Room:  Gender:     Male                          Technician: [id number]  :        1990                   Requested By:ER TRIAGE PROTOCOL  Order #:    [id number]                    Reading MD: STARR DON D.O.    Measurements  Intervals                                Axis  Rate:       67                           P:          68  TN:         144                          QRS:        87  QRSD:       106                          T:          39  QT:         396  QTc:        418    Interpretive Statements  Sinus rhythm  No previous ECG available for comparison  Electronically Signed On 2024 12:17:48 PDT by STARR DON D.O.       EKG:   I have independently interpreted the above EKG.    COURSE & MEDICAL DECISION MAKING     COURSE AND PLAN  10:49 AM - Patient seen and examined at bedside. Discussed plan of care, including obtaining lab work and an EKG to further evaluate, as well as treating his symptoms with fluids. Patient agrees to the plan of care. The patient will be resuscitated with 1L NS IV. Ordered for EKG, CBC w/ diff., and CMP to evaluate his symptoms.     12:22 PM - I reevaluated the patient at bedside. The patient informs me they feel improved following fluid administration. I discussed the patient's diagnostic study results which show no acute abnormalities. I discussed plan for discharge and follow up as outlined below. The patient is stable for discharge at this time and will return for any new or worsening symptoms. Patient verbalizes understanding and support with my plan for discharge.     ED Summary: Patient presents with intermittent episodes of lightheadedness, he is describing it starting out as of brain fog.  He did not have any trouble speaking.  He had an episode where he felt both hands were weak.  There is no lateralizing symptoms that be concerning for stroke.  He is young healthy has no cardiac or CVA risk factors.    He had evaluation at urgent care and they did 6 viral swabs which were negative.  His lab test shows no  electrolyte imbalance.  His WBCs is minimally decreased this may be viral or just a normal variant for him.    He was given IV fluids and his symptoms improved.  At this time I can find no signs of cardiac arrhythmia, no signs of anemia, no electrolyte imbalance no signs of kidney or liver problems.  He is stable for discharge home imaging of the brain is not indicated as his chief complaint is lightheadedness which is not usually a cause for structural brain abnormality.      HYDRATION: Based on the patient's presentation of Other Lightheadedness the patient was given IV fluids. IV Hydration was used because oral hydration was not adequate alone. Upon recheck following hydration, the patient was improved.      DISPOSITION AND DISCUSSIONS  I have discussed management of the patient with the following physicians and DRE's: None    Discussion of management with other QHP or appropriate source(s): None    Barriers to care at this time, including but not limited to: No known barriers of care     The patient will return for new or worsening symptoms and is stable at the time of discharge.    The patient is referred to a primary physician for blood pressure management, diabetic screening, and for all other preventative health concerns.    DISPOSITION:  Patient will be discharged home in stable condition.    FOLLOW UP:  Rylee Zavala A.P.R.NDeric  [address] [address] [phone]    In 1 week      OUTPATIENT MEDICATIONS:  New Prescriptions    No outpatient medications given     FINAL DIAGNOSIS  1. Lightheadedness      Bean MORA (Leoibe), am scribing for, and in the presence of, Dr. Flavio Adrian D.O..    Electronically signed by: Bean Joseph (Nathan), 8/5/2024    Dr. Flavio MORA D.O. personally performed the services described in this documentation, as scribed by Bean Joseph in my presence, and it is both accurate and complete.     The note accurately reflects work and  decisions made by me.  Flavio Adrian D.O.  8/5/2024  3:10 PM

## 2024-08-05 NOTE — DISCHARGE INSTRUCTIONS
Your lab test shows no concerns for significant disease.  Your symptoms are very nonspecific and do not fracture pattern for much.  This may be a viral illness causing to feel fatigued, lightheaded, with brain fog and throat discomfort.    At this time there is no specific treatment just drink extra fluids, get some rest and I will give this some time to improve.  Return if symptoms change or worsen.    Monitor your blood pressure on a daily basis the same time every day no matter how you feel and keep a diary to see if can see a trend in the blood pressure.  Otherwise follow-up with a primary doctor of your blood pressure remains elevated.

## 2024-08-07 ENCOUNTER — HOSPITAL ENCOUNTER (EMERGENCY)
Facility: MEDICAL CENTER | Age: 34
End: 2024-08-07
Attending: EMERGENCY MEDICINE
Payer: COMMERCIAL

## 2024-08-07 ENCOUNTER — APPOINTMENT (OUTPATIENT)
Dept: RADIOLOGY | Facility: MEDICAL CENTER | Age: 34
End: 2024-08-07
Attending: EMERGENCY MEDICINE
Payer: COMMERCIAL

## 2024-08-07 VITALS
SYSTOLIC BLOOD PRESSURE: 122 MMHG | WEIGHT: 224.87 LBS | HEART RATE: 70 BPM | DIASTOLIC BLOOD PRESSURE: 70 MMHG | TEMPERATURE: 98.6 F | BODY MASS INDEX: 29.8 KG/M2 | HEIGHT: 73 IN | RESPIRATION RATE: 16 BRPM | OXYGEN SATURATION: 97 %

## 2024-08-07 DIAGNOSIS — R42 DIZZINESS: ICD-10-CM

## 2024-08-07 DIAGNOSIS — R20.2 PARESTHESIA: ICD-10-CM

## 2024-08-07 LAB — EKG IMPRESSION: NORMAL

## 2024-08-07 PROCEDURE — 93005 ELECTROCARDIOGRAM TRACING: CPT

## 2024-08-07 PROCEDURE — 99283 EMERGENCY DEPT VISIT LOW MDM: CPT

## 2024-08-07 PROCEDURE — 70551 MRI BRAIN STEM W/O DYE: CPT

## 2024-08-07 PROCEDURE — 93005 ELECTROCARDIOGRAM TRACING: CPT | Performed by: EMERGENCY MEDICINE

## 2024-08-07 RX ORDER — MECLIZINE HYDROCHLORIDE 25 MG/1
25 TABLET ORAL 3 TIMES DAILY PRN
Qty: 30 TABLET | Refills: 0 | Status: SHIPPED | OUTPATIENT
Start: 2024-08-07

## 2024-08-07 ASSESSMENT — FIBROSIS 4 INDEX: FIB4 SCORE: 1

## 2024-08-07 NOTE — ED PROVIDER NOTES
"ER Provider Note    Scribed for Amari Alcantara Ii, M.d. by Prasanna Gasca. 8/7/2024  12:36 PM    Primary Care Provider: BRENTON Somers    CHIEF COMPLAINT   Chief Complaint   Patient presents with    Tingling     The pt reports foggy headed and fatigue that started 6 days ago. The pt was seen and discharged. The pt reports increased dizziness that started yesterday, pt reports that he felt like the room was spinning. The pt reports right arm tingling that started last night and persists into this morning. Hx of vertigo     EXTERNAL RECORDS REVIEWED  Outpatient Notes Seen at urgent care 8/4/24 for similar symptoms, given hydration recommendations    HPI/ROS    LIMITATION TO HISTORY   Select: : None    Waldemar Ohara is a 34 y.o. male who presents to the ED complaining of confusion/dizziness onset four days ago. Friday he had some grogginess and felt fatigued, by Sunday he felt close to syncope. On Sunday he was standing when he felt like \"all the blood left my head\", this went away after a few minutes. He was seen at urgent care where viral tests were negative. He then had recurrence of symptoms, came here where his blood work was negative. He denies any issues with his balance but does endorse dizziness. His dizziness is described as the room spinning. Yesterday everything was \"shaking\" due to this dizziness. Currently he notes that when he moves his head quickly his symptoms increase. His symptoms are waxing and waning in nature. When he watches things move quickly he does have worsening symptoms. Last night he did have some right arm numbness, he was able to move the arm properly. He denies any other right sided deficits, no facial droop or slurred speech. He does not have any major medical history, does not take any medications daily.     PAST MEDICAL HISTORY  Past Medical History:   Diagnosis Date    COVID      SURGICAL HISTORY  History reviewed. No pertinent surgical history.    FAMILY " "HISTORY  Family History   Problem Relation Age of Onset    No Known Problems Mother     Hypertension Father     Hyperlipidemia Father     Hypertension Brother     Obesity Brother     No Known Problems Maternal Grandmother     No Known Problems Maternal Grandfather     No Known Problems Paternal Grandmother     No Known Problems Paternal Grandfather        SOCIAL HISTORY   reports that he has never smoked. He has never used smokeless tobacco. He reports current alcohol use. He reports that he does not use drugs.    CURRENT MEDICATIONS  Discharge Medication List as of 8/7/2024  3:03 PM        CONTINUE these medications which have NOT CHANGED    Details   multivitamin Tab Take 1 Tablet by mouth every day., Historical Med      VITAMIN D, CHOLECALCIFEROL, PO Take  by mouth every day., Historical Med      Omega-3 Fatty Acids (FISH OIL PO) Take 2 Tablets by mouth every day., Historical Med             ALLERGIES  Patient has no known allergies.    PHYSICAL EXAM  BP (!) 174/96   Pulse 75   Temp 36.9 °C (98.5 °F) (Temporal)   Resp 18   Ht 1.854 m (6' 1\")   Wt 102 kg (224 lb 13.9 oz)   SpO2 100%   BMI 29.67 kg/m²   Physical Exam  Vitals and nursing note reviewed.   Constitutional:       Appearance: Normal appearance.   HENT:      Head: Normocephalic and atraumatic.      Mouth/Throat:      Mouth: Mucous membranes are moist.   Eyes:      Extraocular Movements: Extraocular movements intact.      Pupils: Pupils are equal, round, and reactive to light.   Cardiovascular:      Rate and Rhythm: Normal rate and regular rhythm.   Pulmonary:      Effort: Pulmonary effort is normal.      Breath sounds: Normal breath sounds.   Skin:     General: Skin is warm.   Neurological:      Mental Status: He is alert.      Comments: Alert and oriented. Speech clear. No obvious aphasia. No facial droop. Unable to reproduce nystagmus. No extremity drift. Normal finger to nose testing.    Psychiatric:         Mood and Affect: Mood normal.      " "  DIAGNOSTIC STUDIES    EKG/LABS  Results for orders placed or performed during the hospital encounter of 24   EKG   Result Value Ref Range    Report       [facility] Emergency Dept.    Test Date:  2024  Pt Name:    MAYO OBWSER       Department: ER  MRN:        [id number]                      Room:       [facility]  Gender:     Male                         Technician: [id number]  :        1990                   Requested By:ER TRIAGE PROTOCOL  Order #:    [id number]                    Reading MD: Amari Alcantara II, MD    Measurements  Intervals                                Axis  Rate:       65                           P:          76  SD:         152                          QRS:        87  QRSD:       100                          T:          57  QT:         392  QTc:        408    Interpretive Statements  Sinus rhythm  Rate 65  ST elev, probable normal early repol pattern. Normal twaves.  Impression: Sinus rhythm EKG with benign early repolarization.  Compared to ECG 2024 10:08:35  ST (T wave) deviation now present  Electronically Signed On 2024 13:00:57 PDT by Amari Alcantara II, MD        I have independently interpreted this EKG. All labs reviewed by me.     RADIOLOGY/PROCEDURES   The attending emergency physician has independently interpreted the diagnostic imaging associated with this visit and am waiting the final reading from the radiologist.   My preliminary interpretation is a follows: No acute abnormalities  Radiologist interpretation:  MR-BRAIN-W/O   Final Result      1.  No acute abnormality.   2.  Unremarkable noncontrast MR examination of the brain.        COURSE & MEDICAL DECISION MAKING     ASSESSMENT, COURSE AND PLAN  Care Narrative: This is an emergency department evaluation of a 34 year old man who is previously healthy now presenting with concerns of \"dizziness,\" aphasia, and right arm paresthesias for the past 1 week. Symptoms not present " all the time. He was evaluated approximately a week ago here and at urgent care. Labs cbc, cmp were normal. Discussion had for CT imaging were had but ultimately not done because he had no focal symptoms. His symptoms now have been persistent and he came back because of symptoms in right arm and word finding difficulties over the past couple days. Objectively this is not really noticeable here. We discussed CT (CTA head and neck) vs MRI imaging (look for demyelinating disease, stroke, masses).  He has expressed significant concerns for radiation. I believe MRI will give more information and if normal will be reassuring. He is agreeable with MRI brain imaging.     ED OBS: No; Patient does not meet criteria for ED Observation.     3:01 PM - Patient was reevaluated at bedside. Discussed lab and radiology results with the patient and informed them that they are reassuring. MRI of the brain does not show any acute abnormalities, reviewed images with patient. Patient will now be discharged at this time. Unclear exact cause of symptoms but he did suggest it could be stress related.  Discussed return precautions and plan for at home care. Will prescribe meclizine for possible vertigo symptoms. He does have an appointment with his PCP tomorrow. Patient verbalizes understanding and agreement to this plan of care.         PROBLEM LIST  #Dizziness    DISPOSITION AND DISCUSSIONS  I have discussed management of the patient with the following physicians and DRE's:  None    Discussion of management with other QHP or appropriate source(s): None     Barriers to care at this time, including but not limited to: None      DISPOSITION:  Patient will be discharged home in stable condition.    FOLLOW UP:  Rylee Zavala, CHELSIE.P.R.N.  [address] [address] [phone]    Call today  Make an appointment for re-evaluation    FINAL DIANGOSIS  1. Paresthesia    2. Dizziness      I, Prasanna Gasca (Scribe), am scribing for,  and in the presence of, STEPHANIE Wiley II.    Electronically signed by: Prasanna Gasca (Scribe), 8/7/2024    IAmari II, M* personally performed the services described in this documentation, as scribed by Prasanna Gasca in my presence, and it is both accurate and complete.      The note accurately reflects work and decisions made by me.  Amari Alcantara II, M.D.  8/7/2024  3:28 PM

## 2024-08-07 NOTE — ED TRIAGE NOTES
"Chief Complaint   Patient presents with    Tingling     The pt reports foggy headed and fatigue that started 6 days ago. The pt was seen and discharged. The pt reports increased dizziness that started yesterday, pt reports that he felt like the room was spinning. The pt reports right arm tingling that started last night and persists into this morning. Hx of vertigo     BP (!) 174/96   Pulse 75   Temp 36.9 °C (98.5 °F) (Temporal)   Resp 18   Ht 1.854 m (6' 1\")   Wt 102 kg (224 lb 13.9 oz)   SpO2 100%   BMI 29.67 kg/m²     Pt ambulatory to triage. Pt A&Ox4.  Pt placed back in [facility], educated on triage process, and told to inform staff of any change in condition.     "

## 2024-08-14 ENCOUNTER — HOSPITAL ENCOUNTER (EMERGENCY)
Facility: MEDICAL CENTER | Age: 34
End: 2024-08-14
Attending: STUDENT IN AN ORGANIZED HEALTH CARE EDUCATION/TRAINING PROGRAM
Payer: COMMERCIAL

## 2024-08-14 VITALS
WEIGHT: 231.48 LBS | BODY MASS INDEX: 30.68 KG/M2 | DIASTOLIC BLOOD PRESSURE: 91 MMHG | HEIGHT: 73 IN | RESPIRATION RATE: 16 BRPM | HEART RATE: 65 BPM | SYSTOLIC BLOOD PRESSURE: 143 MMHG | TEMPERATURE: 98.5 F | OXYGEN SATURATION: 97 %

## 2024-08-14 DIAGNOSIS — H65.92 LEFT NON-SUPPURATIVE OTITIS MEDIA: ICD-10-CM

## 2024-08-14 LAB
ALBUMIN SERPL BCP-MCNC: 4.5 G/DL (ref 3.2–4.9)
ALBUMIN/GLOB SERPL: 1.7 G/DL
ALP SERPL-CCNC: 64 U/L (ref 30–99)
ALT SERPL-CCNC: 13 U/L (ref 2–50)
ANION GAP SERPL CALC-SCNC: 12 MMOL/L (ref 7–16)
AST SERPL-CCNC: 20 U/L (ref 12–45)
BASOPHILS # BLD AUTO: 0.3 % (ref 0–1.8)
BASOPHILS # BLD: 0.02 K/UL (ref 0–0.12)
BILIRUB SERPL-MCNC: 0.9 MG/DL (ref 0.1–1.5)
BUN SERPL-MCNC: 19 MG/DL (ref 8–22)
CALCIUM ALBUM COR SERPL-MCNC: 9.4 MG/DL (ref 8.5–10.5)
CALCIUM SERPL-MCNC: 9.8 MG/DL (ref 8.5–10.5)
CHLORIDE SERPL-SCNC: 105 MMOL/L (ref 96–112)
CO2 SERPL-SCNC: 23 MMOL/L (ref 20–33)
CREAT SERPL-MCNC: 1.11 MG/DL (ref 0.5–1.4)
EKG IMPRESSION: NORMAL
EOSINOPHIL # BLD AUTO: 0.04 K/UL (ref 0–0.51)
EOSINOPHIL NFR BLD: 0.6 % (ref 0–6.9)
ERYTHROCYTE [DISTWIDTH] IN BLOOD BY AUTOMATED COUNT: 39.7 FL (ref 35.9–50)
GFR SERPLBLD CREATININE-BSD FMLA CKD-EPI: 89 ML/MIN/1.73 M 2
GLOBULIN SER CALC-MCNC: 2.6 G/DL (ref 1.9–3.5)
GLUCOSE SERPL-MCNC: 96 MG/DL (ref 65–99)
HCT VFR BLD AUTO: 42.9 % (ref 42–52)
HGB BLD-MCNC: 14.7 G/DL (ref 14–18)
IMM GRANULOCYTES # BLD AUTO: 0.04 K/UL (ref 0–0.11)
IMM GRANULOCYTES NFR BLD AUTO: 0.6 % (ref 0–0.9)
LYMPHOCYTES # BLD AUTO: 2.11 K/UL (ref 1–4.8)
LYMPHOCYTES NFR BLD: 33.7 % (ref 22–41)
MCH RBC QN AUTO: 31 PG (ref 27–33)
MCHC RBC AUTO-ENTMCNC: 34.3 G/DL (ref 32.3–36.5)
MCV RBC AUTO: 90.5 FL (ref 81.4–97.8)
MONOCYTES # BLD AUTO: 0.43 K/UL (ref 0–0.85)
MONOCYTES NFR BLD AUTO: 6.9 % (ref 0–13.4)
NEUTROPHILS # BLD AUTO: 3.63 K/UL (ref 1.82–7.42)
NEUTROPHILS NFR BLD: 57.9 % (ref 44–72)
NRBC # BLD AUTO: 0 K/UL
NRBC BLD-RTO: 0 /100 WBC (ref 0–0.2)
PLATELET # BLD AUTO: 166 K/UL (ref 164–446)
PMV BLD AUTO: 11.9 FL (ref 9–12.9)
POTASSIUM SERPL-SCNC: 4 MMOL/L (ref 3.6–5.5)
PROT SERPL-MCNC: 7.1 G/DL (ref 6–8.2)
RBC # BLD AUTO: 4.74 M/UL (ref 4.7–6.1)
SODIUM SERPL-SCNC: 140 MMOL/L (ref 135–145)
WBC # BLD AUTO: 6.3 K/UL (ref 4.8–10.8)

## 2024-08-14 PROCEDURE — 93005 ELECTROCARDIOGRAM TRACING: CPT | Performed by: STUDENT IN AN ORGANIZED HEALTH CARE EDUCATION/TRAINING PROGRAM

## 2024-08-14 PROCEDURE — 99283 EMERGENCY DEPT VISIT LOW MDM: CPT

## 2024-08-14 PROCEDURE — 85025 COMPLETE CBC W/AUTO DIFF WBC: CPT

## 2024-08-14 PROCEDURE — 93005 ELECTROCARDIOGRAM TRACING: CPT

## 2024-08-14 PROCEDURE — 80053 COMPREHEN METABOLIC PANEL: CPT

## 2024-08-14 PROCEDURE — 36415 COLL VENOUS BLD VENIPUNCTURE: CPT

## 2024-08-14 RX ORDER — FLUTICASONE PROPIONATE 50 MCG
1 SPRAY, SUSPENSION (ML) NASAL DAILY
Qty: 16 G | Refills: 0 | Status: SHIPPED | OUTPATIENT
Start: 2024-08-14 | End: 2024-08-17

## 2024-08-14 RX ORDER — AMOXICILLIN 500 MG/1
500 CAPSULE ORAL 3 TIMES DAILY
Qty: 21 CAPSULE | Refills: 0 | Status: ACTIVE | OUTPATIENT
Start: 2024-08-14 | End: 2024-08-17

## 2024-08-14 ASSESSMENT — FIBROSIS 4 INDEX: FIB4 SCORE: 1

## 2024-08-14 NOTE — ED TRIAGE NOTES
Waldemar Ohara  34 y.o. male  Chief Complaint   Patient presents with    Lightheadedness     Ongoing intermittently for the last few weeks    Ear Pain     Left sided ear pain, pt feels congested as of this morning    Blurred Vision     Both eyes, starting today       Vitals:    08/14/24 1620   BP: (!) 165/82   Pulse: 78   Resp: 16   Temp: 36.9 °C (98.5 °F)   SpO2: 97%       Patient educated on triage process and encouraged to alert staff of any changes in condition.

## 2024-08-14 NOTE — Clinical Note
Waldemar Ohara was seen and treated in our emergency department on 8/14/2024.  He may return to work on 08/16/2024.       If you have any questions or concerns, please don't hesitate to call.      Marisel Street M.D.

## 2024-08-15 ENCOUNTER — OFFICE VISIT (OUTPATIENT)
Dept: MEDICAL GROUP | Facility: PHYSICIAN GROUP | Age: 34
End: 2024-08-15
Payer: COMMERCIAL

## 2024-08-15 VITALS
HEIGHT: 73 IN | SYSTOLIC BLOOD PRESSURE: 126 MMHG | WEIGHT: 227 LBS | BODY MASS INDEX: 30.09 KG/M2 | TEMPERATURE: 97.9 F | HEART RATE: 64 BPM | DIASTOLIC BLOOD PRESSURE: 80 MMHG | OXYGEN SATURATION: 99 %

## 2024-08-15 DIAGNOSIS — Z13.0 SCREENING FOR ENDOCRINE, METABOLIC AND IMMUNITY DISORDER: ICD-10-CM

## 2024-08-15 DIAGNOSIS — Z13.220 SCREENING FOR LIPID DISORDERS: ICD-10-CM

## 2024-08-15 DIAGNOSIS — Z13.29 SCREENING FOR ENDOCRINE, METABOLIC AND IMMUNITY DISORDER: ICD-10-CM

## 2024-08-15 DIAGNOSIS — Z13.228 SCREENING FOR ENDOCRINE, METABOLIC AND IMMUNITY DISORDER: ICD-10-CM

## 2024-08-15 DIAGNOSIS — H65.92 LEFT NONSUPPURATIVE OTITIS MEDIA: ICD-10-CM

## 2024-08-15 DIAGNOSIS — Z00.01 ANNUAL VISIT FOR GENERAL ADULT MEDICAL EXAMINATION WITH ABNORMAL FINDINGS: ICD-10-CM

## 2024-08-15 DIAGNOSIS — Z09 HOSPITAL DISCHARGE FOLLOW-UP: ICD-10-CM

## 2024-08-15 ASSESSMENT — FIBROSIS 4 INDEX: FIB4 SCORE: 1.14

## 2024-08-15 ASSESSMENT — PATIENT HEALTH QUESTIONNAIRE - PHQ9: CLINICAL INTERPRETATION OF PHQ2 SCORE: 0

## 2024-08-15 NOTE — ED NOTES
Patient discharged per order. Oral and written discharge instructions reviewed Medications sent to home pharmacy. New medications reviewed. All belongings accounted for and taken with patient. Questions answered, and patient agrees with discharge plan. Encouraged to follow up with PCP. Ambulatory to марина, given work note.

## 2024-08-15 NOTE — PROGRESS NOTES
Subjective:     CC:   Chief Complaint   Patient presents with    Hospital Follow-up     Lightlessness, dizziness        HPI:   Waldemar Ohara is a 34 y.o. male who presents for an annual exam.     Hospital discharge follow-up  He had presented to urgent care on 2024 with brain fog along with feeling lightheaded.  He was negative for strep A, influenza, COVID and RSV at urgent care.  He presented to ER on 2024 with complaints of lightheadedness.  He was given IV fluids and did feel improved.  He returned to ER on 2024 with confusion/dizziness, right arm numbness.  He had MRI of his brain that did not show any acute abnormality.  He was prescribed meclizine for possible vertigo symptoms.  He presented to ER 2024 for lightheadedness, left ear pain and blurred vision.  CBC and CMP unremarkable.  EKG shows sinus rhythm.  On exam he was found to have left otitis media.  He was prescribed amoxicillin and fluticasone.  He has not picked up the prescriptions as he was discharged in the ER late last night.  He plans to  the prescriptions later today. At home his blood pressure 130's/70's.      Health Maintenance  Cholesterol Screening: ordered   Diabetes Screening: glucose 96 on 2024 labs   Diet: He has been working on improving his diet, less fast food. Encouraged healthy diet.   Exercise: He recently had son.  He is getting back to the gym and is working.   Substance Abuse: discussed and reviewed.    Safe in relationship.   Seat belts safety discussed.  Sun protection used.  Has established eye doctor ([facility]) and dental home.     Cancer screening  Colorectal Cancer Screening: n/a;discussed    Lung Cancer Screening: non-smoker    Prostate Cancer Screening/PSA: n/a; discussed     Infectious disease screening/Immunizations  --STI Screening: no  --Practices safe sex.  --HIV Screening: declines   --Hepatitis C Screenin2023   --Immunizations:    Influenza: encouraged annual     Tetanus: 2/19/2024    Shingles: n/a    Pneumococcal : n/a     Other immunizations: declines COVID and Hep B     He  has a past medical history of COVID.    He has no past medical history of Anxiety, Depression, or Hypertension.  He  has no past surgical history on file.  Family History   Problem Relation Age of Onset    No Known Problems Mother     Hypertension Father     Hyperlipidemia Father     Hypertension Brother     Obesity Brother     No Known Problems Maternal Grandmother     No Known Problems Maternal Grandfather     No Known Problems Paternal Grandmother     No Known Problems Paternal Grandfather      Social History     Tobacco Use    Smoking status: Never    Smokeless tobacco: Never   Vaping Use    Vaping status: Never Used   Substance Use Topics    Alcohol use: Yes     Comment: rare    Drug use: Never       Patient Active Problem List    Diagnosis Date Noted    Hospital discharge follow-up 08/15/2024       Current Outpatient Medications   Medication Sig Dispense Refill    meclizine (ANTIVERT) 25 MG Tab Take 1 Tablet by mouth 3 times a day as needed for Dizziness, Nausea/Vomiting or Vertigo. 30 Tablet 0    multivitamin Tab Take 1 Tablet by mouth every day.      VITAMIN D, CHOLECALCIFEROL, PO Take  by mouth every day.      Omega-3 Fatty Acids (FISH OIL PO) Take 2 Tablets by mouth every day.      fluticasone (FLONASE) 50 MCG/ACT nasal spray Administer 1 Spray into affected nostril(S) every day. (Patient not taking: Reported on 8/15/2024) 16 g 0    amoxicillin (AMOXIL) 500 MG Cap Take 1 Capsule by mouth 3 times a day for 7 days. (Patient not taking: Reported on 8/15/2024) 21 Capsule 0     No current facility-administered medications for this visit.    (including changes today)  Allergies: Patient has no known allergies.    Review of Systems     Denies chest pain, shortness of breath, abdominal pain, urinary symptoms or leg swelling.    Objective:     Vital signs reviewed  /80 (BP Location: Right  "arm, Patient Position: Sitting, BP Cuff Size: Adult)   Pulse 64   Temp 36.6 °C (97.9 °F) (Temporal)   Ht 1.854 m (6' 1\")   Wt 103 kg (227 lb)   SpO2 99%   BMI 29.95 kg/m²   Body mass index is 29.95 kg/m².  Wt Readings from Last 4 Encounters:   08/15/24 103 kg (227 lb)   08/14/24 105 kg (231 lb 7.7 oz)   08/07/24 102 kg (224 lb 13.9 oz)   08/05/24 105 kg (231 lb 14.8 oz)       Physical Exam:  Constitutional: Well-developed and well-nourished. Not diaphoretic. No distress.   Skin: Skin is warm and dry. No rash noted.  Head: Atraumatic without lesions.  Eyes: Conjunctivae and extraocular motions are normal. Pupils are equal, round, and reactive to light. No scleral icterus.   Ears:  External ears unremarkable.  Right TM pearly gray, left TM with mild erythema and swelling.  Nose: Nares patent. Septum midline. Turbinates with erythema.  No discharge.   Mouth/Throat: Dentition is intact. Tongue normal. Oropharynx is clear and moist. Posterior pharynx without erythema or exudates.  Neck: Supple, trachea midline. Normal range of motion. No lymphadenopathy--cervical or supraclavicular.  Cardiovascular: Regular rate and rhythm, S1 and S2 without murmur, rubs, or gallops.    Lungs: Effort normal. Clear to auscultation throughout. No adventitious sounds. No CVA tenderness.  Abdomen: Soft, non tender, and without distention. Active bowel sounds in all four quadrants. No rebound, guarding, masses or HSM.  : Genitalia: Deferred  Rectal: deferred  Prostate: deferred  Extremities: No cyanosis, clubbing, erythema, nor edema.  Musculoskeletal: All major joints AROM full in all directions without pain.  Neurological: Alert and oriented x 3.  Psychiatric:  Behavior, mood, and affect are appropriate.        Assessment and Plan:     1. Annual visit for general adult medical examination with abnormal findings  Acute uncomplicated problem.  Annual exam completed today. Discussion today about general wellness and lifestyle " habits:  Engage in regular physical and social activities  Skin care, including sunscreen  Recommended annual eye exams and annual dental exams  Discussed wearing seatbelt when in car at all times    2. Hospital discharge follow-up  Acute uncomplicated problem.  Hospital follow-up completed today.  We reviewed and discussed his recent hospitalizations as well as labs and imaging.  He is informed that he may receive an additional charges we did do his hospital follow-up during his annual visit.  He verbalized understanding.    3. Left nonsuppurative otitis media  Acute uncomplicated problem.  Recommend he continue with amoxicillin 500 mg 3 times daily and continue fluticasone nasal spray daily.  Follow-up with symptoms worsen.    4. Screening for lipid disorders  Acute uncomplicated problem.  Annual lipid panel ordered.  - Lipid Profile; Future    5. Screening for endocrine, metabolic and immunity disorder  Acute uncomplicated problem.  Annual screening labs ordered.  - TSH WITH REFLEX TO FT4; Future  - VITAMIN D,25 HYDROXY (DEFICIENCY); Future      HCM: Declines COVID and hep B vaccine.  Labs per orders.  Vaccinations per orders.  Counseling about diet, supplements, exercise, skin care and safe sex.    Follow-up: Return in about 1 year (around 8/15/2025) for annual.      Please note that this dictation was created using voice recognition software. I have made every reasonable attempt to correct obvious errors, but I expect that there are errors of grammar and possibly content that I did not discover before finalizing the note.

## 2024-08-15 NOTE — ASSESSMENT & PLAN NOTE
He had presented to urgent care on 8/4/2024 with brain fog along with feeling lightheaded.  He was negative for strep A, influenza, COVID and RSV at urgent care.  He presented to ER on 8/5/2024 with complaints of lightheadedness.  He was given IV fluids and did feel improved.  He returned to ER on 8/7/2024 with confusion/dizziness, right arm numbness.  He had MRI of his brain that did not show any acute abnormality.  He was prescribed meclizine for possible vertigo symptoms.  He presented to ER 8/14/2024 for lightheadedness, left ear pain and blurred vision.  CBC and CMP unremarkable.  EKG shows sinus rhythm.  On exam he was found to have left otitis media.  He was prescribed amoxicillin and fluticasone.  He has not picked up the prescriptions as he was discharged in the ER late last night.  He plans to  the prescriptions later today. At home his blood pressure 130's/70's.

## 2024-08-15 NOTE — ED PROVIDER NOTES
ED Provider Note    CHIEF COMPLAINT  Chief Complaint   Patient presents with    Lightheadedness     Ongoing intermittently for the last few weeks    Ear Pain     Left sided ear pain, pt feels congested as of this morning    Blurred Vision     Both eyes, starting today       EXTERNAL RECORDS REVIEWED  Patient seen in the ER on 7 August for tingling sensation.  Workup was unremarkable.    HPI/ROS  LIMITATION TO HISTORY   None  OUTSIDE HISTORIAN(S):  None    Waldemar Ohara is a 34 y.o. male who presents for evaluation of lightheadedness, brain fog, ear congestion and fatigue.  Symptoms have not been ongoing for about 2 weeks.  He has intermittent episodes of lightheadedness.  He has also had associated brain fog and fatigue .  This morning he felt congested in his left ear but was not having any pain.  He felt his ear popping. No fevers, chills or nasal congestion.  No pain in his face or headache.  He says however that he felt that his vision was blurry and he was having trouble concentrating however says that has almost completely resolved at this time.     PAST MEDICAL HISTORY   has a past medical history of COVID.    SURGICAL HISTORY  patient denies any surgical history    FAMILY HISTORY  Family History   Problem Relation Age of Onset    No Known Problems Mother     Hypertension Father     Hyperlipidemia Father     Hypertension Brother     Obesity Brother     No Known Problems Maternal Grandmother     No Known Problems Maternal Grandfather     No Known Problems Paternal Grandmother     No Known Problems Paternal Grandfather        SOCIAL HISTORY  Social History     Tobacco Use    Smoking status: Never    Smokeless tobacco: Never   Vaping Use    Vaping status: Never Used   Substance and Sexual Activity    Alcohol use: Yes     Comment: occ    Drug use: Never    Sexual activity: Not Currently     Partners: Female     Birth control/protection: None       CURRENT MEDICATIONS  Home Medications    **Home  "medications have not yet been reviewed for this encounter**         ALLERGIES  No Known Allergies    PHYSICAL EXAM  VITAL SIGNS: BP (!) 143/91   Pulse 65   Temp 36.9 °C (98.5 °F) (Temporal)   Resp 16   Ht 1.854 m (6' 1\")   Wt 105 kg (231 lb 7.7 oz)   SpO2 97%   BMI 30.54 kg/m²    Constitutional: Awake and alert. Nontoxic  HENT: He has opacification of the left tympanic membrane without perforation.  Normal appearing right TM  Eyes: Grossly normal  Neck: Normal range of motion  Cardiovascular: Normal heart rate   Thorax & Lungs: No respiratory distress  Abdomen: Nontender  Skin:  No pathologic rash.   Neuro: He is alert and oriented x 4.  Speech is clear.  No facial droop.  No nystagmus.  Moving all extremities symmetrically.  Extremities: Well perfused  Psychiatric: Affect normal      EKG/LABS  Results for orders placed or performed during the hospital encounter of 08/14/24   CBC WITH DIFFERENTIAL   Result Value Ref Range    WBC 6.3 4.8 - 10.8 K/uL    RBC 4.74 4.70 - 6.10 M/uL    Hemoglobin 14.7 14.0 - 18.0 g/dL    Hematocrit 42.9 42.0 - 52.0 %    MCV 90.5 81.4 - 97.8 fL    MCH 31.0 27.0 - 33.0 pg    MCHC 34.3 32.3 - 36.5 g/dL    RDW 39.7 35.9 - 50.0 fL    Platelet Count 166 164 - 446 K/uL    MPV 11.9 9.0 - 12.9 fL    Neutrophils-Polys 57.90 44.00 - 72.00 %    Lymphocytes 33.70 22.00 - 41.00 %    Monocytes 6.90 0.00 - 13.40 %    Eosinophils 0.60 0.00 - 6.90 %    Basophils 0.30 0.00 - 1.80 %    Immature Granulocytes 0.60 0.00 - 0.90 %    Nucleated RBC 0.00 0.00 - 0.20 /100 WBC    Neutrophils (Absolute) 3.63 1.82 - 7.42 K/uL    Lymphs (Absolute) 2.11 1.00 - 4.80 K/uL    Monos (Absolute) 0.43 0.00 - 0.85 K/uL    Eos (Absolute) 0.04 0.00 - 0.51 K/uL    Baso (Absolute) 0.02 0.00 - 0.12 K/uL    Immature Granulocytes (abs) 0.04 0.00 - 0.11 K/uL    NRBC (Absolute) 0.00 K/uL   COMP METABOLIC PANEL   Result Value Ref Range    Sodium 140 135 - 145 mmol/L    Potassium 4.0 3.6 - 5.5 mmol/L    Chloride 105 96 - 112 mmol/L "    Co2 23 20 - 33 mmol/L    Anion Gap 12.0 7.0 - 16.0    Glucose 96 65 - 99 mg/dL    Bun 19 8 - 22 mg/dL    Creatinine 1.11 0.50 - 1.40 mg/dL    Calcium 9.8 8.5 - 10.5 mg/dL    Correct Calcium 9.4 8.5 - 10.5 mg/dL    AST(SGOT) 20 12 - 45 U/L    ALT(SGPT) 13 2 - 50 U/L    Alkaline Phosphatase 64 30 - 99 U/L    Total Bilirubin 0.9 0.1 - 1.5 mg/dL    Albumin 4.5 3.2 - 4.9 g/dL    Total Protein 7.1 6.0 - 8.2 g/dL    Globulin 2.6 1.9 - 3.5 g/dL    A-G Ratio 1.7 g/dL   ESTIMATED GFR   Result Value Ref Range    GFR (CKD-EPI) 89 >60 mL/min/1.73 m 2   EKG   Result Value Ref Range    Report       [facility]    Test Date:  2024  Pt Name:    MAYO BOWSER       Department: ER  MRN:        [id number]                      Room:  Gender:     Male                         Technician: [id number]  :        1990                   Requested By:ER TRIAGE PROTOCOL  Order #:    [id number]                    Reading MD: Marisel Street    Measurements  Intervals                                Axis  Rate:       71                           P:          67  ID:         [id number]                          QRS:        92  QRSD:       106                          T:          56  QT:         391  QTc:        425    Interpretive Statements  Sinus rhythm  Borderline right axis deviation  ST elev, probable normal early repol pattern  Compared to ECG 2024 10:47:06  Early repolarization no longer present  ST (T wave) deviation still present  Electronically Signed On 2024 18:59:49 PDT by Marisel Street             COURSE & MEDICAL DECISION MAKING    ASSESSMENT, COURSE AND PLAN  Care Narrative: This is a 34-year-old male who presents with 2 weeks of intermittent lightheadedness, 'brain fog', blurry vision and then today just also started to have left ear pain and congestion.  Patient arrives mildly hypertensive but otherwise has normal vital signs and is systemically well-appearing. He is neurologically  intac, His lab work is  unremarkable. EKG without ischemia or dysrhythmia.    Patient did just have an MRI of the brain that was performed on 7 August which showed no acute abnormalities, specifically no signs of CVA, large mass or other concerning finding. I see no indication to repeat imaging today.  He does have otitis media on exam today which certainly could explain some of his symptoms.  I will start him on amoxicillin for this.  He has no fever, altered mentation or toxic appearance to suggest intracranial extension or CNS infection. He has no signs of mastoiditis on exam.  I think he can be safely discharged and follow-up as an outpatient.  All his questions were answered and he was discharged in good condition.         DISPOSITION AND DISCUSSIONS  I have discussed management of the patient with the following physicians and DRE's:  None    Discussion of management with other QHP or appropriate source(s): None     Escalation of care considered, and ultimately not performed:diagnostic imaging, patient just had MRI and no indication to repeat this    Barriers to care at this time, including but not limited to: None    Decision tools and prescription drugs considered including, but not limited to: Antibiotics Will prescribe for otitis media .    FINAL DIAGNOSIS  1. Left non-suppurative otitis media Acute        Electronically signed by: Marisel Street M.D., 8/14/2024 5:30 PM

## 2024-08-17 ENCOUNTER — OFFICE VISIT (OUTPATIENT)
Dept: URGENT CARE | Facility: PHYSICIAN GROUP | Age: 34
End: 2024-08-17
Payer: COMMERCIAL

## 2024-08-17 VITALS
SYSTOLIC BLOOD PRESSURE: 120 MMHG | HEIGHT: 73 IN | RESPIRATION RATE: 18 BRPM | BODY MASS INDEX: 30.39 KG/M2 | WEIGHT: 229.28 LBS | DIASTOLIC BLOOD PRESSURE: 80 MMHG | TEMPERATURE: 97.9 F | HEART RATE: 62 BPM | OXYGEN SATURATION: 98 %

## 2024-08-17 DIAGNOSIS — F41.9 ANXIETY: ICD-10-CM

## 2024-08-17 PROCEDURE — 99213 OFFICE O/P EST LOW 20 MIN: CPT | Performed by: FAMILY MEDICINE

## 2024-08-17 PROCEDURE — 3079F DIAST BP 80-89 MM HG: CPT | Performed by: FAMILY MEDICINE

## 2024-08-17 PROCEDURE — 3074F SYST BP LT 130 MM HG: CPT | Performed by: FAMILY MEDICINE

## 2024-08-17 RX ORDER — HYDROXYZINE HYDROCHLORIDE 25 MG/1
25 TABLET, FILM COATED ORAL 3 TIMES DAILY PRN
Qty: 30 TABLET | Refills: 0 | Status: SHIPPED | OUTPATIENT
Start: 2024-08-17 | End: 2024-08-21 | Stop reason: SDUPTHER

## 2024-08-17 ASSESSMENT — FIBROSIS 4 INDEX: FIB4 SCORE: 1.14

## 2024-08-18 ASSESSMENT — ENCOUNTER SYMPTOMS
VOMITING: 0
EYE REDNESS: 0
DOUBLE VISION: 0
PHOTOPHOBIA: 0
NAUSEA: 0
EYE DISCHARGE: 0
MYALGIAS: 0
WEIGHT LOSS: 0

## 2024-08-18 NOTE — PROGRESS NOTES
"Subjective     Waldemar Ohara is a 34 y.o. male who presents with Blurred Vision and Fatigue (Has had similar issues for the past 2 weeks. )            2 weeks fatigue and slightly blurred vision.  He has been evaluated in the emergency department with laboratory studies and MRI brain.  He notes that he does feel anxious.  No panic attack.  No substance abuse.  Multiple life stressors including 5-month-old baby.  He does note a viral illness proxy 1 month ago that he suspected was COVID-19.  No other aggravating or alleviating factors.        Review of Systems   Constitutional:  Negative for weight loss.   Eyes:  Negative for double vision, photophobia, discharge and redness.   Gastrointestinal:  Negative for nausea and vomiting.   Musculoskeletal:  Negative for joint pain and myalgias.   Skin:  Negative for itching and rash.              Objective     /80 (BP Location: Left arm, Patient Position: Sitting, BP Cuff Size: Adult)   Pulse 62   Temp 36.6 °C (97.9 °F) (Temporal)   Resp 18   Ht 1.854 m (6' 1\")   Wt 104 kg (229 lb 4.5 oz)   SpO2 98%   BMI 30.25 kg/m²      Physical Exam  Constitutional:       General: He is not in acute distress.     Appearance: He is well-developed.   HENT:      Head: Normocephalic and atraumatic.      Right Ear: Tympanic membrane normal.      Left Ear: Tympanic membrane normal.      Nose: Nose normal. No congestion.   Eyes:      Extraocular Movements: Extraocular movements intact.      Conjunctiva/sclera: Conjunctivae normal.      Pupils: Pupils are equal, round, and reactive to light.      Comments: Limited fundus exam reveals sharp disc and vessels bilateral   Cardiovascular:      Rate and Rhythm: Normal rate and regular rhythm.      Heart sounds: Normal heart sounds. No murmur heard.  Pulmonary:      Effort: Pulmonary effort is normal.      Breath sounds: Normal breath sounds. No wheezing.   Skin:     General: Skin is warm and dry.      Findings: No rash. "   Neurological:      Mental Status: He is alert.                             Assessment & Plan        Assessment & Plan  Anxiety    Orders:    hydrOXYzine HCl (ATARAX) 25 MG Tab; Take 1 Tablet by mouth 3 times a day as needed for Anxiety.     Differential diagnosis, natural history, supportive care, and indications for immediate follow-up were discussed.     He has reached out to a work program and will inquire about potential cognitive behavioral therapy.  He will follow-up with primary care.

## 2024-08-21 ENCOUNTER — OFFICE VISIT (OUTPATIENT)
Dept: MEDICAL GROUP | Facility: PHYSICIAN GROUP | Age: 34
End: 2024-08-21
Payer: COMMERCIAL

## 2024-08-21 ENCOUNTER — HOSPITAL ENCOUNTER (OUTPATIENT)
Dept: LAB | Facility: MEDICAL CENTER | Age: 34
End: 2024-08-21
Attending: NURSE PRACTITIONER
Payer: COMMERCIAL

## 2024-08-21 VITALS
BODY MASS INDEX: 30.22 KG/M2 | DIASTOLIC BLOOD PRESSURE: 80 MMHG | HEART RATE: 60 BPM | OXYGEN SATURATION: 99 % | HEIGHT: 73 IN | TEMPERATURE: 97.9 F | WEIGHT: 228 LBS | SYSTOLIC BLOOD PRESSURE: 134 MMHG

## 2024-08-21 DIAGNOSIS — Z13.228 SCREENING FOR ENDOCRINE, METABOLIC AND IMMUNITY DISORDER: ICD-10-CM

## 2024-08-21 DIAGNOSIS — U09.9 LONG COVID: ICD-10-CM

## 2024-08-21 DIAGNOSIS — Z13.29 SCREENING FOR ENDOCRINE, METABOLIC AND IMMUNITY DISORDER: ICD-10-CM

## 2024-08-21 DIAGNOSIS — Z13.0 SCREENING FOR ENDOCRINE, METABOLIC AND IMMUNITY DISORDER: ICD-10-CM

## 2024-08-21 DIAGNOSIS — R45.89 ANXIETY ABOUT HEALTH: ICD-10-CM

## 2024-08-21 DIAGNOSIS — Z13.220 SCREENING FOR LIPID DISORDERS: ICD-10-CM

## 2024-08-21 LAB
25(OH)D3 SERPL-MCNC: 35 NG/ML (ref 30–100)
CHOLEST SERPL-MCNC: 193 MG/DL (ref 100–199)
HDLC SERPL-MCNC: 48 MG/DL
LDLC SERPL CALC-MCNC: 127 MG/DL
TRIGL SERPL-MCNC: 89 MG/DL (ref 0–149)
TSH SERPL DL<=0.005 MIU/L-ACNC: 1.89 UIU/ML (ref 0.38–5.33)

## 2024-08-21 PROCEDURE — 99213 OFFICE O/P EST LOW 20 MIN: CPT | Performed by: NURSE PRACTITIONER

## 2024-08-21 PROCEDURE — 3075F SYST BP GE 130 - 139MM HG: CPT | Performed by: NURSE PRACTITIONER

## 2024-08-21 PROCEDURE — 82306 VITAMIN D 25 HYDROXY: CPT

## 2024-08-21 PROCEDURE — 36415 COLL VENOUS BLD VENIPUNCTURE: CPT

## 2024-08-21 PROCEDURE — 3079F DIAST BP 80-89 MM HG: CPT | Performed by: NURSE PRACTITIONER

## 2024-08-21 PROCEDURE — 84443 ASSAY THYROID STIM HORMONE: CPT

## 2024-08-21 PROCEDURE — 80061 LIPID PANEL: CPT

## 2024-08-21 RX ORDER — HYDROXYZINE HYDROCHLORIDE 25 MG/1
25 TABLET, FILM COATED ORAL 3 TIMES DAILY PRN
Qty: 30 TABLET | Refills: 1 | Status: SHIPPED | OUTPATIENT
Start: 2024-08-21

## 2024-08-21 ASSESSMENT — FIBROSIS 4 INDEX: FIB4 SCORE: 1.14

## 2024-08-21 NOTE — PROGRESS NOTES
"Subjective:     CC: Lightheadedness, blurred vision, long covid     HPI:   L[place] presents today with the following:      He did not complete 7 day course of amoxicillin that was prescribed at last ER visit for otitis media. He felt the antibiotics caused him to feel tired. He completed 4 days. He has not taken meclizine. Here today to discuss several issues. Yesterday he woke up with sore throat and his body temperature felt like he was getting sick. He did not have fever. He noticed lightheadedness at work that lasted 1-2 hours. He works at [employer].  His job does require him to bend over frequently. He stays hydrated. After the lightheadedness he got a migraine that resolved with Tylenol. Also states his eye sight gets \"tunneled' and hard to focus. The change from bright to dark environments can cause eye pain. He has made an eye appointment for tomorrow. He seen Urgent Care over the weekend and discussion that he may have long COVID due to fatigue and brain fog. He has been taking hydroxyzine at night. He has signed up for therapy with work. He has had multiple ER visits with unremarkable labs and imaging.       Past Medical History:   Diagnosis Date    COVID        Social History     Tobacco Use    Smoking status: Never    Smokeless tobacco: Never   Vaping Use    Vaping status: Never Used   Substance Use Topics    Alcohol use: Yes     Comment: rare    Drug use: Never       Current Outpatient Medications Ordered in Epic   Medication Sig Dispense Refill    hydrOXYzine HCl (ATARAX) 25 MG Tab Take 1 Tablet by mouth 3 times a day as needed for Anxiety. 30 Tablet 1    multivitamin Tab Take 1 Tablet by mouth every day.      Omega-3 Fatty Acids (FISH OIL PO) Take 2 Tablets by mouth every day.      meclizine (ANTIVERT) 25 MG Tab Take 1 Tablet by mouth 3 times a day as needed for Dizziness, Nausea/Vomiting or Vertigo. (Patient not taking: Reported on 8/21/2024) 30 Tablet 0    VITAMIN D, CHOLECALCIFEROL, PO Take  by mouth " "every day. (Patient not taking: Reported on 8/21/2024)       No current [facility]-ordered facility-administered medications on file.       Allergies:  Patient has no known allergies.    Health Maintenance: Reviewed      Objective:     Vital signs reviewed  Exam:  /80 (BP Location: Right arm, Patient Position: Sitting, BP Cuff Size: Adult)   Pulse 60   Temp 36.6 °C (97.9 °F) (Temporal)   Ht 1.854 m (6' 1\")   Wt 103 kg (228 lb)   SpO2 99%   BMI 30.08 kg/m²  Body mass index is 30.08 kg/m².    General: Normal appearing. No distress.  HENT: Normocephalic. Ears normal shape and contour, canals are clear bilaterally, right TM pearly gray, left TM improved erythema, pearly gray.   Eyes: Eyes conjunctiva clear lids without ptosis, pupils equal and reactive to light accommodation, lids normal.  Pulmonary: Clear to ausculation.  Normal effort. No rales, ronchi, or wheezing.  Cardiovascular: Regular rate and rhythm without murmur.   Skin: Warm and dry.  No obvious lesions.  Psych: Normal mood and affect. Alert and oriented x3. Judgment and insight is normal.      Assessment & Plan:     34 y.o. male with the following -     1. Anxiety about health  Acute uncomplicated problem.  He has been tolerating hydroxyzine at nighttime.  Reassurance provided today about his vital signs and health.  We did discuss that he is okay to stop his amoxicillin as his ear infection is improved.  He has access to therapy at his job and will reach out to schedule.  Keep upcoming eye appointment tomorrow.  Red flags discussed. For his lightheadedness discussed staying hydrated, change positions slowly. Reassurance that MRI and recent labs unremarkable.   - hydrOXYzine HCl (ATARAX) 25 MG Tab; Take 1 Tablet by mouth 3 times a day as needed for Anxiety.  Dispense: 30 Tablet; Refill: 1    2. Long COVID  Acute complicated problem.  Discussed that his symptoms of fatigue and brain fog do appear consistent with long COVID.  Reassurance provided " today.      Return in about 4 weeks (around 9/18/2024) for anxiety .    Please note that this dictation was created using voice recognition software. I have made every reasonable attempt to correct obvious errors, but I expect that there are errors of grammar and possibly content that I did not discover before finalizing the note.

## 2024-08-26 ENCOUNTER — HOSPITAL ENCOUNTER (EMERGENCY)
Facility: MEDICAL CENTER | Age: 34
End: 2024-08-26
Attending: EMERGENCY MEDICINE
Payer: COMMERCIAL

## 2024-08-26 VITALS
SYSTOLIC BLOOD PRESSURE: 145 MMHG | RESPIRATION RATE: 16 BRPM | WEIGHT: 231.26 LBS | BODY MASS INDEX: 30.51 KG/M2 | HEART RATE: 69 BPM | DIASTOLIC BLOOD PRESSURE: 86 MMHG | TEMPERATURE: 98.5 F | OXYGEN SATURATION: 98 %

## 2024-08-26 DIAGNOSIS — R42 LIGHTHEADEDNESS: ICD-10-CM

## 2024-08-26 DIAGNOSIS — J02.9 SORE THROAT: ICD-10-CM

## 2024-08-26 LAB
ALBUMIN SERPL BCP-MCNC: 4.8 G/DL (ref 3.2–4.9)
ALBUMIN/GLOB SERPL: 1.6 G/DL
ALP SERPL-CCNC: 69 U/L (ref 30–99)
ALT SERPL-CCNC: 16 U/L (ref 2–50)
ANION GAP SERPL CALC-SCNC: 13 MMOL/L (ref 7–16)
AST SERPL-CCNC: 23 U/L (ref 12–45)
BASOPHILS # BLD AUTO: 0.3 % (ref 0–1.8)
BASOPHILS # BLD: 0.02 K/UL (ref 0–0.12)
BILIRUB SERPL-MCNC: 0.7 MG/DL (ref 0.1–1.5)
BUN SERPL-MCNC: 18 MG/DL (ref 8–22)
CALCIUM ALBUM COR SERPL-MCNC: 9.5 MG/DL (ref 8.5–10.5)
CALCIUM SERPL-MCNC: 10.1 MG/DL (ref 8.5–10.5)
CHLORIDE SERPL-SCNC: 104 MMOL/L (ref 96–112)
CO2 SERPL-SCNC: 23 MMOL/L (ref 20–33)
CREAT SERPL-MCNC: 1.01 MG/DL (ref 0.5–1.4)
EKG IMPRESSION: NORMAL
EOSINOPHIL # BLD AUTO: 0.06 K/UL (ref 0–0.51)
EOSINOPHIL NFR BLD: 1 % (ref 0–6.9)
ERYTHROCYTE [DISTWIDTH] IN BLOOD BY AUTOMATED COUNT: 41 FL (ref 35.9–50)
FLUAV RNA SPEC QL NAA+PROBE: NEGATIVE
FLUBV RNA SPEC QL NAA+PROBE: NEGATIVE
GFR SERPLBLD CREATININE-BSD FMLA CKD-EPI: 100 ML/MIN/1.73 M 2
GLOBULIN SER CALC-MCNC: 3 G/DL (ref 1.9–3.5)
GLUCOSE SERPL-MCNC: 94 MG/DL (ref 65–99)
HCT VFR BLD AUTO: 46.7 % (ref 42–52)
HETEROPH AB SER QL: NEGATIVE
HGB BLD-MCNC: 15.8 G/DL (ref 14–18)
IMM GRANULOCYTES # BLD AUTO: 0.05 K/UL (ref 0–0.11)
IMM GRANULOCYTES NFR BLD AUTO: 0.8 % (ref 0–0.9)
LYMPHOCYTES # BLD AUTO: 2.27 K/UL (ref 1–4.8)
LYMPHOCYTES NFR BLD: 36 % (ref 22–41)
MCH RBC QN AUTO: 30.5 PG (ref 27–33)
MCHC RBC AUTO-ENTMCNC: 33.8 G/DL (ref 32.3–36.5)
MCV RBC AUTO: 90.2 FL (ref 81.4–97.8)
MONOCYTES # BLD AUTO: 0.37 K/UL (ref 0–0.85)
MONOCYTES NFR BLD AUTO: 5.9 % (ref 0–13.4)
NEUTROPHILS # BLD AUTO: 3.54 K/UL (ref 1.82–7.42)
NEUTROPHILS NFR BLD: 56 % (ref 44–72)
NRBC # BLD AUTO: 0 K/UL
NRBC BLD-RTO: 0 /100 WBC (ref 0–0.2)
PLATELET # BLD AUTO: 166 K/UL (ref 164–446)
PMV BLD AUTO: 11.4 FL (ref 9–12.9)
POTASSIUM SERPL-SCNC: 4.6 MMOL/L (ref 3.6–5.5)
PROT SERPL-MCNC: 7.8 G/DL (ref 6–8.2)
RBC # BLD AUTO: 5.18 M/UL (ref 4.7–6.1)
RSV RNA SPEC QL NAA+PROBE: NEGATIVE
SARS-COV-2 RNA RESP QL NAA+PROBE: NOTDETECTED
SODIUM SERPL-SCNC: 140 MMOL/L (ref 135–145)
WBC # BLD AUTO: 6.3 K/UL (ref 4.8–10.8)

## 2024-08-26 PROCEDURE — 86308 HETEROPHILE ANTIBODY SCREEN: CPT

## 2024-08-26 PROCEDURE — 0241U HCHG SARS-COV-2 COVID-19 NFCT DS RESP RNA 4 TRGT ED POC: CPT

## 2024-08-26 PROCEDURE — 93005 ELECTROCARDIOGRAM TRACING: CPT | Performed by: EMERGENCY MEDICINE

## 2024-08-26 PROCEDURE — 36415 COLL VENOUS BLD VENIPUNCTURE: CPT

## 2024-08-26 PROCEDURE — 85025 COMPLETE CBC W/AUTO DIFF WBC: CPT

## 2024-08-26 PROCEDURE — 99284 EMERGENCY DEPT VISIT MOD MDM: CPT

## 2024-08-26 PROCEDURE — 80053 COMPREHEN METABOLIC PANEL: CPT

## 2024-08-26 ASSESSMENT — FIBROSIS 4 INDEX: FIB4 SCORE: 1.14

## 2024-08-26 NOTE — ED PROVIDER NOTES
"ER Provider Note    Scribed for Garcia Veronica M.d. by Omayra Dewey. 8/26/2024  4:29 PM    Primary Care Provider: BRENTON Somers    CHIEF COMPLAINT   Chief Complaint   Patient presents with    Lightheadedness     Intermittent x 3-4 weeks with weakness.     Sore Throat     Reports + covid 6 weeks ago. Was recently on abx for ear infection did not complete d/t not feeling well taking them     EXTERNAL RECORDS REVIEWED  Reviewed previous ED visit for similar complaint.    HPI/ROS  LIMITATION TO HISTORY   Select: : None  OUTSIDE HISTORIAN(S):  None    Waldemar Ohara is a 34 y.o. male who presents to the ED complaining of intermittent lightheadedness onset 4 weeks ago. Patient explains he had Covid-19 about 2 months ago. He no longer has it, but since then he reports having intermittent lightheadedness with associated symptoms of weakness, vision blurriness, and dizziness. He reports yesterday he endorsed a sore throat and dizziness earlier in the morning as well. He then adds at work his coworker commented on him looking \"unusually pale and yellow.\" The patient explains he has been getting increase frequency of symptoms such as \"brain fog, blurry vision and dizziness\". He states he drink splenty of fluids. Patient denies drinking or smoking.  No chest pain cough or shortness of breath.  No palpitations or syncope.    PAST MEDICAL HISTORY  Past Medical History:   Diagnosis Date    COVID        SURGICAL HISTORY  History reviewed. No pertinent surgical history.    FAMILY HISTORY  Family History   Problem Relation Age of Onset    No Known Problems Mother     Hypertension Father     Hyperlipidemia Father     Hypertension Brother     Obesity Brother     No Known Problems Maternal Grandmother     No Known Problems Maternal Grandfather     No Known Problems Paternal Grandmother     No Known Problems Paternal Grandfather        SOCIAL HISTORY   reports that he has never smoked. He has never used " smokeless tobacco. He reports current alcohol use. He reports that he does not use drugs.    CURRENT MEDICATIONS  Previous Medications    HYDROXYZINE HCL (ATARAX) 25 MG TAB    Take 1 Tablet by mouth 3 times a day as needed for Anxiety.    MECLIZINE (ANTIVERT) 25 MG TAB    Take 1 Tablet by mouth 3 times a day as needed for Dizziness, Nausea/Vomiting or Vertigo.    MULTIVITAMIN TAB    Take 1 Tablet by mouth every day.    OMEGA-3 FATTY ACIDS (FISH OIL PO)    Take 2 Tablets by mouth every day.    VITAMIN D, CHOLECALCIFEROL, PO    Take  by mouth every day.       ALLERGIES  Patient has no known allergies.    PHYSICAL EXAM  BP (!) 147/90   Pulse 64   Temp 36.9 °C (98.5 °F) (Temporal)   Resp 16   Wt 105 kg (231 lb 4.2 oz)   SpO2 97%   BMI 30.51 kg/m²   Constitutional: Well developed, Well nourished, No acute distress, Non-toxic appearance.   HENT: Normocephalic, Atraumatic, Bilateral external ears normal, Oropharynx moist, No oral exudates, Nose normal.  Normal pharyngeal erythema.  TMs are normal  Eyes: PERRL, EOMI, Conjunctiva normal, No discharge.   Neck: Normal range of motion, No tenderness, Supple, No stridor.   Cardiovascular: Normal heart rate, Normal rhythm, No murmurs, No rubs, No gallops.   Thorax & Lungs: Normal breath sounds, No respiratory distress, No wheezing  Abdomen: Bowel sounds normal, Soft, No tenderness  Skin: Warm, Dry, No erythema, No rash.     Musculoskeletal: Good range of motion in all major joints.   Neurologic: Alert, No focal deficits noted.   Psychiatric: Affect normal    DIAGNOSTIC STUDIES    EKG/LABS  Results for orders placed or performed during the hospital encounter of 08/26/24   CBC WITH DIFFERENTIAL   Result Value Ref Range    WBC 6.3 4.8 - 10.8 K/uL    RBC 5.18 4.70 - 6.10 M/uL    Hemoglobin 15.8 14.0 - 18.0 g/dL    Hematocrit 46.7 42.0 - 52.0 %    MCV 90.2 81.4 - 97.8 fL    MCH 30.5 27.0 - 33.0 pg    MCHC 33.8 32.3 - 36.5 g/dL    RDW 41.0 35.9 - 50.0 fL    Platelet Count 166 164  - 446 K/uL    MPV 11.4 9.0 - 12.9 fL    Neutrophils-Polys 56.00 44.00 - 72.00 %    Lymphocytes 36.00 22.00 - 41.00 %    Monocytes 5.90 0.00 - 13.40 %    Eosinophils 1.00 0.00 - 6.90 %    Basophils 0.30 0.00 - 1.80 %    Immature Granulocytes 0.80 0.00 - 0.90 %    Nucleated RBC 0.00 0.00 - 0.20 /100 WBC    Neutrophils (Absolute) 3.54 1.82 - 7.42 K/uL    Lymphs (Absolute) 2.27 1.00 - 4.80 K/uL    Monos (Absolute) 0.37 0.00 - 0.85 K/uL    Eos (Absolute) 0.06 0.00 - 0.51 K/uL    Baso (Absolute) 0.02 0.00 - 0.12 K/uL    Immature Granulocytes (abs) 0.05 0.00 - 0.11 K/uL    NRBC (Absolute) 0.00 K/uL   MONONUCLEOSIS TEST QUAL   Result Value Ref Range    Heterophile Screen Negative Negative   POC CoV-2, FLU A/B, RSV by PCR   Result Value Ref Range    POC Influenza A RNA, PCR Negative Negative    POC Influenza B RNA, PCR Negative Negative    POC RSV, by PCR Negative Negative    POC SARS-CoV-2, PCR NotDetected NotDetected     I have independently interpreted this EKG    COURSE & MEDICAL DECISION MAKING     ASSESSMENT, COURSE AND PLAN  Care Narrative: This is an emergency department evaluation of a 34 year old male who presents with lightheadedness, sore throat and weakness. Patient had Covid two months ago but notes he has been having such intermittent symptoms since then. I will evaluate with CBC with diff, CMP, Monocucleosis test Qual, POCT CoV-2, Flu A/B, RSV by PCR and EKG. Patient verbalizes understanding and agreement to this plan of care.     Patient presents with a chief complaint of lightheadedness.  Differential diagnosis considered includes but is not limited to dehydration, viral illness, inner ear infection, sinus infection, new viral infection, electrolyte abnormality, symptomatic anemia.    Patient has had an MRI this month which is also reassuring.  At this point we will repeat the patient's labs including viral testing I added a Monospot but she does have a lingering viral infection.  If this is all  unremarkable patient will be discharged home to follow-up with primary care.  His neurologic exam and remainder of his other exam is unremarkable at this time.      Patient was recently seen and evaluated for similar process and had reassuring labs and was discharged home.    DISPOSITION AND DISCUSSIONS  I have discussed management of the patient with the following physicians and DRE's:  None    Discussion of management with other [facility] or appropriate source(s): None     Patient be asked follow-up with his doctor further workup and treatment of the labs are unremarkable.        FINAL DIANGOSIS  1. Lightheadedness    2. Sore throat        Omayra MORA (Scribe), am scribing for, and in the presence of, Garcia Veronica M.D..    Electronically signed by: Omayra Dewey (Nathan), 8/26/2024    Garcia MORA M.D. personally performed the services described in this documentation, as scribed by Omayra Dewey in my presence, and it is both accurate and complete.       The note accurately reflects work and decisions made by me.  Garcia Veronica M.D.  8/26/2024  5:48 PM

## 2024-08-27 NOTE — DISCHARGE INSTRUCTIONS
Rest, drink plenty of fluids.  Follow-up with your doctor.  Return to the ER for worsening symptoms or other concerns.

## 2024-08-29 ENCOUNTER — APPOINTMENT (OUTPATIENT)
Dept: MEDICAL GROUP | Facility: PHYSICIAN GROUP | Age: 34
End: 2024-08-29
Payer: COMMERCIAL

## 2024-08-31 ENCOUNTER — OFFICE VISIT (OUTPATIENT)
Dept: URGENT CARE | Facility: PHYSICIAN GROUP | Age: 34
End: 2024-08-31
Payer: COMMERCIAL

## 2024-08-31 VITALS
TEMPERATURE: 98.4 F | HEART RATE: 77 BPM | SYSTOLIC BLOOD PRESSURE: 134 MMHG | RESPIRATION RATE: 14 BRPM | BODY MASS INDEX: 29.95 KG/M2 | HEIGHT: 73 IN | OXYGEN SATURATION: 99 % | WEIGHT: 225.97 LBS | DIASTOLIC BLOOD PRESSURE: 86 MMHG

## 2024-08-31 DIAGNOSIS — R42 DIZZINESS: ICD-10-CM

## 2024-08-31 DIAGNOSIS — J02.9 SORE THROAT: ICD-10-CM

## 2024-08-31 LAB
FLUAV RNA SPEC QL NAA+PROBE: NEGATIVE
FLUBV RNA SPEC QL NAA+PROBE: NEGATIVE
RSV RNA SPEC QL NAA+PROBE: NEGATIVE
S PYO DNA SPEC NAA+PROBE: NOT DETECTED
SARS-COV-2 RNA RESP QL NAA+PROBE: POSITIVE

## 2024-08-31 ASSESSMENT — ENCOUNTER SYMPTOMS
WEAKNESS: 0
VOMITING: 0
SORE THROAT: 1
CHILLS: 0
FEVER: 0
COUGH: 0
HEADACHES: 0
DIZZINESS: 1
ABDOMINAL PAIN: 0
SHORTNESS OF BREATH: 0
NAUSEA: 0

## 2024-08-31 ASSESSMENT — FIBROSIS 4 INDEX: FIB4 SCORE: 1.18

## 2024-08-31 NOTE — LETTER
[facility] [place]  [address] [id number]     August 31, 2024    Patient: Waldemar Ohara   YOB: 1990   Date of Visit: 8/31/2024       To Whom It May Concern:    Waldemar Ohara was seen and treated in our department on 8/31/2024. Patient is exhibiting symptoms from potential mold exposure.  My suggestion is to please allow for this patient to move to a different apartment or perhaps have mold abatement on the apartment as symptoms began shortly after moving into this dwelling.  Patient has had an extensive medical evaluation.      Sincerely,     TRISTAN Moore.

## 2024-08-31 NOTE — PROGRESS NOTES
Chief Complaint   Patient presents with    Pharyngitis     X 1 week     Dizziness     X this AM, when turning head    Other     Concentration and brain fog  X 1 month on and off        HISTORY OF PRESENT ILLNESS: Patient is a pleasant 34 y.o. male who presents to urgent care today ongoing symptoms for the last month to include intermittent dizziness, sore throat, brain fatigue.  Patient has been seen in the emergency room and by his provider several times now, he has had labs as well as EKG to rule out potential causes, everything has been negative.  Patient believes he may have mold in his apartment he is quite concerned that this is a potential contributing factor.  He denies any chest pain, no major shortness of breath, he is alert and oriented x 4 today here in the office.    Patient Active Problem List    Diagnosis Date Noted    Hospital discharge follow-up 08/15/2024       Allergies:Patient has no known allergies.    Current Outpatient Medications Ordered in Epic   Medication Sig Dispense Refill    hydrOXYzine HCl (ATARAX) 25 MG Tab Take 1 Tablet by mouth 3 times a day as needed for Anxiety. 30 Tablet 1    meclizine (ANTIVERT) 25 MG Tab Take 1 Tablet by mouth 3 times a day as needed for Dizziness, Nausea/Vomiting or Vertigo. (Patient not taking: Reported on 8/21/2024) 30 Tablet 0    multivitamin Tab Take 1 Tablet by mouth every day.      VITAMIN D, CHOLECALCIFEROL, PO Take  by mouth every day. (Patient not taking: Reported on 8/21/2024)      Omega-3 Fatty Acids (FISH OIL PO) Take 2 Tablets by mouth every day.       No current [facility]-ordered facility-administered medications on file.       Past Medical History:   Diagnosis Date    COVID        Social History     Tobacco Use    Smoking status: Never    Smokeless tobacco: Never   Vaping Use    Vaping status: Never Used   Substance Use Topics    Alcohol use: Yes     Comment: rare    Drug use: Never       Family Status   Relation Name Status    Mo  Alive    Fa  Alive  "   Bro  Alive    MGMo      MGFa      PGMo      PGFa     No partnership data on file     Family History   Problem Relation Age of Onset    No Known Problems Mother     Hypertension Father     Hyperlipidemia Father     Hypertension Brother     Obesity Brother     No Known Problems Maternal Grandmother     No Known Problems Maternal Grandfather     No Known Problems Paternal Grandmother     No Known Problems Paternal Grandfather        Review of Systems   Constitutional:  Positive for malaise/fatigue. Negative for chills and fever.   HENT:  Positive for sore throat. Negative for congestion and ear pain.    Respiratory:  Negative for cough and shortness of breath.    Cardiovascular:  Negative for chest pain.   Gastrointestinal:  Negative for abdominal pain, nausea and vomiting.   Skin:  Negative for rash.   Neurological:  Positive for dizziness. Negative for weakness and headaches.       Exam:  /86   Pulse 77   Temp 36.9 °C (98.4 °F) (Temporal)   Resp 14   Ht 1.854 m (6' 1\")   Wt 102 kg (225 lb 15.5 oz)   SpO2 99%   Physical Exam  Vitals reviewed.   Constitutional:       General: He is not in acute distress.     Appearance: Normal appearance. He is normal weight. He is not ill-appearing.   HENT:      Head: Normocephalic.      Right Ear: Tympanic membrane and ear canal normal. There is no impacted cerumen. Tympanic membrane is not injected or erythematous.      Left Ear: Tympanic membrane and ear canal normal. There is no impacted cerumen. Tympanic membrane is not injected or erythematous.      Nose: Nose normal. No congestion.      Mouth/Throat:      Mouth: Mucous membranes are moist.      Pharynx: Oropharynx is clear. No oropharyngeal exudate.      Tonsils: No tonsillar exudate. 0 on the right. 0 on the left.   Eyes:      General:         Right eye: No discharge.         Left eye: No discharge.      Extraocular Movements: Extraocular movements intact.      Conjunctiva/sclera: " Conjunctivae normal.      Pupils: Pupils are equal, round, and reactive to light.   Cardiovascular:      Rate and Rhythm: Normal rate and regular rhythm.      Pulses: Normal pulses.      Heart sounds: Normal heart sounds. No murmur heard.  Pulmonary:      Effort: Pulmonary effort is normal. No respiratory distress.      Breath sounds: Normal breath sounds. No stridor. No wheezing.   Abdominal:      General: Abdomen is flat. Bowel sounds are normal.      Palpations: Abdomen is soft.   Musculoskeletal:         General: Normal range of motion.      Cervical back: Normal range of motion.   Lymphadenopathy:      Cervical: No cervical adenopathy.   Skin:     General: Skin is warm and dry.      Capillary Refill: Capillary refill takes less than 2 seconds.      Findings: No bruising or rash.   Neurological:      General: No focal deficit present.      Mental Status: He is alert.      GCS: GCS eye subscore is 4. GCS verbal subscore is 5. GCS motor subscore is 6.      Cranial Nerves: No cranial nerve deficit, dysarthria or facial asymmetry.      Sensory: No sensory deficit.      Motor: No weakness.      Coordination: Romberg sign negative. Coordination normal. Finger-Nose-Finger Test normal.      Gait: Gait is intact.   Psychiatric:         Mood and Affect: Mood normal.         Behavior: Behavior normal.         Thought Content: Thought content normal.         Judgment: Judgment normal.         Assessment/Plan:  1. Sore throat  - POCT CoV-2, Flu A/B, RSV by PCR  - POCT GROUP A STREP, PCR    2. Dizziness    Based on physical exam along with review of systems I do think patient may have potential mold exposure however I did have a long discussion with the patient, ultimately the mold needs to either be removed or patient needs to move to a different apartment.  Patient is quite anxious today here in the office, I did review his labs at length that were completed on 8/28, everything was within normal limits at this time.  I do  not believe the patient has an infectious disorder at this time, I did go ahead and provide COVID swab and strep swab as patient is complaining of an ongoing sore throat.  Neurologically patient is within normal limits, pupils are round equal and reactive to light,  are equal bilaterally, he is moving all 4 limbs, no sensory loss that I can assess.  S1 and S2 can be heard, no murmurs, no edema, lung sounds are clear to auscultation, his physical exam is benign here in the office.  He is not complaining of any dizziness this morning, notes brain fog however he is alert and oriented x 4, no slurring of his speech, no changes to his vision.  I did provide a note to the patient for his apartment, at this point I do believe perhaps moving apartments would be the best approach.  Encouraged patient to drink plenty of fluids, multivitamin, proper diet and exercise. Total time spent with the patient 35 minutes to include, review of chart, charting, assessment, procedure.    Supportive care, differential diagnoses, and indications for immediate follow-up discussed with patient.   Pathogenesis of diagnosis discussed including typical length and natural progression.   Instructed to return to clinic or nearest emergency department for any change in condition, further concerns, or worsening of symptoms.  Patient states understanding of the plan of care and discharge instructions.  Instructed to make an appointment, for follow up, with primary care provider.      Please note that this dictation was created using voice recognition software. I have made every reasonable attempt to correct obvious errors, but I expect that there are errors of grammar and possibly content that I did not discover before finalizing the note.      Lexus GREY

## 2024-09-09 ENCOUNTER — OFFICE VISIT (OUTPATIENT)
Dept: URGENT CARE | Facility: PHYSICIAN GROUP | Age: 34
End: 2024-09-09
Payer: COMMERCIAL

## 2024-09-09 VITALS
RESPIRATION RATE: 18 BRPM | DIASTOLIC BLOOD PRESSURE: 74 MMHG | OXYGEN SATURATION: 98 % | BODY MASS INDEX: 30.37 KG/M2 | WEIGHT: 229.17 LBS | HEIGHT: 73 IN | SYSTOLIC BLOOD PRESSURE: 126 MMHG | HEART RATE: 72 BPM | TEMPERATURE: 98.1 F

## 2024-09-09 DIAGNOSIS — R42 DIZZINESS: ICD-10-CM

## 2024-09-09 DIAGNOSIS — H60.312 ACUTE DIFFUSE OTITIS EXTERNA OF LEFT EAR: ICD-10-CM

## 2024-09-09 PROCEDURE — 3078F DIAST BP <80 MM HG: CPT

## 2024-09-09 PROCEDURE — 3074F SYST BP LT 130 MM HG: CPT

## 2024-09-09 PROCEDURE — 99214 OFFICE O/P EST MOD 30 MIN: CPT

## 2024-09-09 RX ORDER — CIPROFLOXACIN AND DEXAMETHASONE 3; 1 MG/ML; MG/ML
SUSPENSION/ DROPS AURICULAR (OTIC)
Qty: 7.5 ML | Refills: 0 | Status: SHIPPED | OUTPATIENT
Start: 2024-09-09 | End: 2024-09-23

## 2024-09-09 RX ORDER — MECLIZINE HYDROCHLORIDE 25 MG/1
25 TABLET ORAL 3 TIMES DAILY PRN
Qty: 30 TABLET | Refills: 0 | Status: SHIPPED | OUTPATIENT
Start: 2024-09-09 | End: 2024-09-20

## 2024-09-09 ASSESSMENT — ENCOUNTER SYMPTOMS
CHILLS: 0
FEVER: 0
VOMITING: 0
NAUSEA: 0
ABDOMINAL PAIN: 0
HEADACHES: 0
DIZZINESS: 1
SORE THROAT: 0
COUGH: 0
MYALGIAS: 0
BLURRED VISION: 1
DIARRHEA: 0
SHORTNESS OF BREATH: 0

## 2024-09-09 ASSESSMENT — FIBROSIS 4 INDEX: FIB4 SCORE: 1.18

## 2024-09-09 NOTE — PROGRESS NOTES
Subjective:   Waldemar Ohara is a 34 y.o. male who presents for Blurred Vision (Been going on for about 3 days. States it almost feels like vertigo. Laying down kind of makes It worse. )      Blurred Vision  This is a recurrent problem. Episode onset: x6 weeks. The problem has been waxing and waning. Pertinent negatives include no abdominal pain, chest pain, chills, congestion, coughing, fever, headaches, myalgias, nausea, rash, sore throat or vomiting. Exacerbated by: Laying down. He has tried nothing for the symptoms.       Review of Systems   Constitutional:  Negative for chills, fever and malaise/fatigue.   HENT:  Positive for ear pain (Left). Negative for congestion, hearing loss and sore throat.    Eyes:  Positive for blurred vision.   Respiratory:  Negative for cough and shortness of breath.    Cardiovascular:  Negative for chest pain.   Gastrointestinal:  Negative for abdominal pain, diarrhea, nausea and vomiting.   Genitourinary:  Negative for dysuria.   Musculoskeletal:  Negative for myalgias.   Skin:  Negative for rash.   Neurological:  Positive for dizziness. Negative for headaches.       Past Medical History:   Diagnosis Date    COVID        Current Outpatient Medications Ordered in Epic   Medication Sig Dispense Refill    meclizine (ANTIVERT) 25 MG Tab Take 1 Tablet by mouth 3 times a day as needed for Vertigo. 30 Tablet 0    ciprofloxacin/dexamethasone (CIPRODEX) 0.3-0.1 % Suspension Apply 4 drops twice daily to the affected ear(s) x7 days 7.5 mL 0    hydrOXYzine HCl (ATARAX) 25 MG Tab Take 1 Tablet by mouth 3 times a day as needed for Anxiety. 30 Tablet 1    multivitamin Tab Take 1 Tablet by mouth every day.       No current [facility]-ordered facility-administered medications on file.       No past surgical history on file.    Social History     Tobacco Use    Smoking status: Never    Smokeless tobacco: Never   Vaping Use    Vaping status: Never Used   Substance Use Topics    Alcohol use: Yes      "Comment: rare    Drug use: Never       family history includes Hyperlipidemia in his father; Hypertension in his brother and father; No Known Problems in his maternal grandfather, maternal grandmother, mother, paternal grandfather, and paternal grandmother; Obesity in his brother.        Objective:     /74 (BP Location: Right arm, Patient Position: Sitting, BP Cuff Size: Adult)   Pulse 72   Temp 36.7 °C (98.1 °F) (Temporal)   Resp 18   Ht 1.854 m (6' 1\")   Wt 104 kg (229 lb 2.7 oz)   SpO2 98%     Physical Exam  Vitals and nursing note reviewed.   Constitutional:       General: He is not in acute distress.     Appearance: Normal appearance. He is not ill-appearing.   HENT:      Head: Normocephalic and atraumatic.      Right Ear: Tympanic membrane normal. Tympanic membrane is not perforated, erythematous or bulging.      Left Ear: Tympanic membrane normal. Swelling and tenderness present. No drainage. Tympanic membrane is not perforated, erythematous or bulging.      Nose: Nose normal.      Mouth/Throat:      Mouth: Mucous membranes are moist.      Pharynx: Oropharynx is clear.   Eyes:      Conjunctiva/sclera: Conjunctivae normal.      Pupils: Pupils are equal, round, and reactive to light.   Cardiovascular:      Rate and Rhythm: Normal rate.      Heart sounds: Normal heart sounds.   Pulmonary:      Effort: Pulmonary effort is normal.      Breath sounds: Normal breath sounds.   Abdominal:      General: Abdomen is flat.      Palpations: Abdomen is soft.   Skin:     General: Skin is warm and dry.      Capillary Refill: Capillary refill takes less than 2 seconds.   Neurological:      Mental Status: He is alert and oriented to person, place, and time.   Psychiatric:         Mood and Affect: Mood normal.         Behavior: Behavior normal.         Assessment/Plan:       1. Dizziness  meclizine (ANTIVERT) 25 MG Tab      2. Acute diffuse otitis externa of left ear  ciprofloxacin/dexamethasone (CIPRODEX) 0.3-0.1 % " Suspension    Referral to ENT        After assessment patient has had ongoing on and off problems of dizziness for the past 6 weeks.  Patient has had multiple workups including MRI of his brain along with seeing ophthalmology with no significant findings.  Patient was told that this could be long symptoms after infection with COVID.  Patient reports that he did also have a recent infection with COVID back in July but symptoms have resolved.  Patient did have mild complaints of left ear discomfort and did have noticed erythema and redness along with mild tenderness with examination.  Does look like patient may have possible otitis externa.  Patient was provided Ciprodex at this time.  Patient instructed take as prescribed.  Patient does report that this dizziness feels mildly similar to vertigo that he had in the past and was provided meclizine at this time.  Due to the multiple workups so far unsure if this could be related to patient's current ear problem.  Patient was provided a referral to ENT for further management of patient's ongoing symptoms.  Patient instructed to monitor for any worsening signs and symptoms if any other concerns patient was instructed to return to urgent care or emergency department for further management.    Differential diagnosis, natural history, and supportive care discussed. We also reviewed side effects of medication including allergic response, GI upset, tendon injury, rash, sedation etc. Patient and/or guardian voices understanding.      Advised the patient to follow-up with the primary care physician for recheck, reevaluation, and consideration of further management.    I personally reviewed prior external notes and test results pertinent to today's visit as well as additional imaging and testing completed in clinic today.     Please note that this dictation was created using voice recognition software. I have made every reasonable attempt to correct obvious errors, but I expect  that there are errors of grammar and possibly content that I did not discover before finalizing the note.    This note was electronically signed by BRENTON Combs

## 2024-09-17 ENCOUNTER — OFFICE VISIT (OUTPATIENT)
Dept: URGENT CARE | Facility: PHYSICIAN GROUP | Age: 34
End: 2024-09-17
Payer: COMMERCIAL

## 2024-09-17 ENCOUNTER — HOSPITAL ENCOUNTER (OUTPATIENT)
Dept: RADIOLOGY | Facility: MEDICAL CENTER | Age: 34
End: 2024-09-17
Attending: FAMILY MEDICINE
Payer: COMMERCIAL

## 2024-09-17 ENCOUNTER — HOSPITAL ENCOUNTER (OUTPATIENT)
Dept: LAB | Facility: MEDICAL CENTER | Age: 34
End: 2024-09-17
Attending: FAMILY MEDICINE
Payer: COMMERCIAL

## 2024-09-17 VITALS
HEIGHT: 73 IN | HEART RATE: 60 BPM | DIASTOLIC BLOOD PRESSURE: 80 MMHG | TEMPERATURE: 97.9 F | BODY MASS INDEX: 30.35 KG/M2 | WEIGHT: 229 LBS | RESPIRATION RATE: 16 BRPM | SYSTOLIC BLOOD PRESSURE: 122 MMHG | OXYGEN SATURATION: 98 %

## 2024-09-17 DIAGNOSIS — R05.2 SUBACUTE COUGH: ICD-10-CM

## 2024-09-17 LAB
ALBUMIN SERPL BCP-MCNC: 4.6 G/DL (ref 3.2–4.9)
ALBUMIN/GLOB SERPL: 1.8 G/DL
ALP SERPL-CCNC: 65 U/L (ref 30–99)
ALT SERPL-CCNC: 18 U/L (ref 2–50)
ANION GAP SERPL CALC-SCNC: 11 MMOL/L (ref 7–16)
AST SERPL-CCNC: 19 U/L (ref 12–45)
BASOPHILS # BLD AUTO: 0.3 % (ref 0–1.8)
BASOPHILS # BLD: 0.02 K/UL (ref 0–0.12)
BILIRUB SERPL-MCNC: 0.7 MG/DL (ref 0.1–1.5)
BUN SERPL-MCNC: 18 MG/DL (ref 8–22)
CALCIUM ALBUM COR SERPL-MCNC: 9.2 MG/DL (ref 8.5–10.5)
CALCIUM SERPL-MCNC: 9.7 MG/DL (ref 8.5–10.5)
CHLORIDE SERPL-SCNC: 104 MMOL/L (ref 96–112)
CO2 SERPL-SCNC: 24 MMOL/L (ref 20–33)
CREAT SERPL-MCNC: 1.12 MG/DL (ref 0.5–1.4)
EOSINOPHIL # BLD AUTO: 0.07 K/UL (ref 0–0.51)
EOSINOPHIL NFR BLD: 1.1 % (ref 0–6.9)
ERYTHROCYTE [DISTWIDTH] IN BLOOD BY AUTOMATED COUNT: 39.6 FL (ref 35.9–50)
GFR SERPLBLD CREATININE-BSD FMLA CKD-EPI: 88 ML/MIN/1.73 M 2
GLOBULIN SER CALC-MCNC: 2.5 G/DL (ref 1.9–3.5)
GLUCOSE SERPL-MCNC: 95 MG/DL (ref 65–99)
HCT VFR BLD AUTO: 46.2 % (ref 42–52)
HGB BLD-MCNC: 15.4 G/DL (ref 14–18)
IMM GRANULOCYTES # BLD AUTO: 0.05 K/UL (ref 0–0.11)
IMM GRANULOCYTES NFR BLD AUTO: 0.8 % (ref 0–0.9)
LYMPHOCYTES # BLD AUTO: 2.36 K/UL (ref 1–4.8)
LYMPHOCYTES NFR BLD: 35.6 % (ref 22–41)
MCH RBC QN AUTO: 29.9 PG (ref 27–33)
MCHC RBC AUTO-ENTMCNC: 33.3 G/DL (ref 32.3–36.5)
MCV RBC AUTO: 89.7 FL (ref 81.4–97.8)
MONOCYTES # BLD AUTO: 0.44 K/UL (ref 0–0.85)
MONOCYTES NFR BLD AUTO: 6.6 % (ref 0–13.4)
NEUTROPHILS # BLD AUTO: 3.69 K/UL (ref 1.82–7.42)
NEUTROPHILS NFR BLD: 55.6 % (ref 44–72)
NRBC # BLD AUTO: 0 K/UL
NRBC BLD-RTO: 0 /100 WBC (ref 0–0.2)
PLATELET # BLD AUTO: 194 K/UL (ref 164–446)
PMV BLD AUTO: 11.9 FL (ref 9–12.9)
POTASSIUM SERPL-SCNC: 4.5 MMOL/L (ref 3.6–5.5)
PROT SERPL-MCNC: 7.1 G/DL (ref 6–8.2)
RBC # BLD AUTO: 5.15 M/UL (ref 4.7–6.1)
SODIUM SERPL-SCNC: 139 MMOL/L (ref 135–145)
WBC # BLD AUTO: 6.6 K/UL (ref 4.8–10.8)

## 2024-09-17 PROCEDURE — 80053 COMPREHEN METABOLIC PANEL: CPT

## 2024-09-17 PROCEDURE — 3079F DIAST BP 80-89 MM HG: CPT | Performed by: FAMILY MEDICINE

## 2024-09-17 PROCEDURE — 99214 OFFICE O/P EST MOD 30 MIN: CPT | Performed by: FAMILY MEDICINE

## 2024-09-17 PROCEDURE — 3074F SYST BP LT 130 MM HG: CPT | Performed by: FAMILY MEDICINE

## 2024-09-17 PROCEDURE — 85025 COMPLETE CBC W/AUTO DIFF WBC: CPT

## 2024-09-17 PROCEDURE — 36415 COLL VENOUS BLD VENIPUNCTURE: CPT

## 2024-09-17 PROCEDURE — 71046 X-RAY EXAM CHEST 2 VIEWS: CPT

## 2024-09-17 RX ORDER — METHYLPREDNISOLONE 4 MG
TABLET, DOSE PACK ORAL
Qty: 21 TABLET | Refills: 0 | Status: SHIPPED | OUTPATIENT
Start: 2024-09-17 | End: 2024-09-26

## 2024-09-17 ASSESSMENT — FIBROSIS 4 INDEX: FIB4 SCORE: 1.18

## 2024-09-17 NOTE — PROGRESS NOTES
"Chief Complaint        COUGH               .          CC:  cough        Cough  This is a new problem. The current episode started 2 mth ago. The problem has been worse in past week. The problem occurs constantly. The cough is dry.   Pt denies : fatigue, muscle aches, fever. Pertinent negatives include no   headaches, nausea, vomiting, diarrhea, sweats, weight loss or wheezing. Nothing aggravates the symptoms.  Patient has tried nothing for the symptoms. There is no history of asthma.        Past Medical History:   Diagnosis Date    COVID          Social History     Tobacco Use    Smoking status: Never    Smokeless tobacco: Never   Vaping Use    Vaping status: Never Used   Substance Use Topics    Alcohol use: Not Currently     Comment: rare    Drug use: Never         No current outpatient medications on file prior to visit.     No current facility-administered medications on file prior to visit.                    Review of Systems   Constitutional: Negative for fever and weight loss.   HENT: negative for otalgia  Cardiovascular - denies chest pain or dyspnea  Respiratory: Positive for cough.  .  Negative for wheezing.    Neurological: Negative for headaches.   GI - denies nausea, vomiting or diarrhea  Neuro - denies numbness or tingling.            Objective:     /80   Pulse 60   Temp 36.6 °C (97.9 °F)   Resp 16   Ht 1.854 m (6' 1\")   Wt 104 kg (229 lb)   SpO2 98%     Physical Exam   Constitutional: patient is oriented to person, place, and time. Patient appears well-developed and well-nourished. No distress.   HENT:   Head: Normocephalic and atraumatic.   Right Ear: External ear normal.   Left Ear: External ear normal.   Nose: Mucosal edema  present. Right sinus exhibits no maxillary sinus tenderness. Left sinus exhibits no maxillary sinus tenderness.   Mouth/Throat: Mucous membranes are normal. No oral lesions.  No posterior pharyngeal erythema.  No oropharyngeal exudate or posterior oropharyngeal " edema.   Eyes: Conjunctivae and EOM are normal. Pupils are equal, round, and reactive to light. Right eye exhibits no discharge. Left eye exhibits no discharge. No scleral icterus.   Neck: Normal range of motion. Neck supple. No tracheal deviation present.   Cardiovascular: Normal rate, regular rhythm and normal heart sounds.  Exam reveals no friction rub.    Pulmonary/Chest: Effort normal. No respiratory distress. Patient has no wheezes or rhonchi. Patient has no rales.    Musculoskeletal:  exhibits no edema.   Lymphadenopathy:     Patient has no cervical adenopathy.      Neurological: patient is alert and oriented to person, place, and time.   Skin: Skin is warm and dry. No rash noted. No erythema.   Psychiatric: patient  has a normal mood and affect.  behavior is normal.   Nursing note and vitals reviewed.    DX-CHEST-2 VIEWS  Order: [id number]   Status: Final result       Visible to patient: Yes (seen)       Next appt: 09/26/2024 at 04:00 PM [email or web address]       Dx: Subacute cough    0 Result Notes  Details    Reading Physician Reading Date Result Priority   Marck Metz M.D.  [phone] 9/17/2024      Narrative & Impression     9/17/2024 3:10 PM     HISTORY/REASON FOR EXAM:  cough; Cough.        TECHNIQUE/EXAM DESCRIPTION AND NUMBER OF VIEWS:  Two views of the chest.     COMPARISON:  1 view chest 5/5/2023     FINDINGS:  LUNGS: The lungs are clear.     HEART and MEDIASTINUM: normal in size.     Pleura: There are no pleural effusion or pneumothoraces.     Osseous structures: No significant bony abnormality.        IMPRESSION:     Negative two views of the chest.           Exam Ended: 09/17/24  3:15 PM Last Resulted: 09/17/24  3:16 PM                Assessment/Plan:       1. Subacute cough   Worse in last wk    Chest x-ray was personally interpreted and reviewed. No acute cardiopulmonary findings. No pulmonary infiltrates or densities. Cardiac silhouette is normal. No  hemidiaphragm elevation. No bony abnormalities.      - CBC WITH DIFFERENTIAL; Future  - Comp Metabolic Panel; Future  - methylPREDNISolone (MEDROL DOSEPAK) 4 MG Tablet Therapy Pack; Follow schedule on package instructions.  Dispense: 21 Tablet; Refill: 0     Differential diagnosis, natural history, supportive care, and indications for immediate follow-up discussed. All questions answered. Patient agrees with the plan of care.     Follow-up as needed if symptoms worsen or fail to improve to PCP, Urgent care or Emergency Room.     I have personally reviewed prior external notes and test results pertinent to today's visit.  I have independently reviewed and interpreted all diagnostics ordered during this urgent care acute visit.

## 2024-09-19 ENCOUNTER — APPOINTMENT (OUTPATIENT)
Dept: MEDICAL GROUP | Facility: PHYSICIAN GROUP | Age: 34
End: 2024-09-19
Payer: COMMERCIAL

## 2024-09-20 ENCOUNTER — OFFICE VISIT (OUTPATIENT)
Dept: URGENT CARE | Facility: PHYSICIAN GROUP | Age: 34
End: 2024-09-20
Payer: COMMERCIAL

## 2024-09-20 ENCOUNTER — HOSPITAL ENCOUNTER (EMERGENCY)
Facility: MEDICAL CENTER | Age: 34
End: 2024-09-20
Attending: STUDENT IN AN ORGANIZED HEALTH CARE EDUCATION/TRAINING PROGRAM
Payer: COMMERCIAL

## 2024-09-20 VITALS
OXYGEN SATURATION: 94 % | BODY MASS INDEX: 27.17 KG/M2 | WEIGHT: 205.03 LBS | HEART RATE: 60 BPM | TEMPERATURE: 98.7 F | SYSTOLIC BLOOD PRESSURE: 142 MMHG | DIASTOLIC BLOOD PRESSURE: 78 MMHG | RESPIRATION RATE: 16 BRPM | HEIGHT: 73 IN

## 2024-09-20 VITALS
DIASTOLIC BLOOD PRESSURE: 62 MMHG | BODY MASS INDEX: 27.17 KG/M2 | RESPIRATION RATE: 18 BRPM | SYSTOLIC BLOOD PRESSURE: 114 MMHG | HEIGHT: 73 IN | OXYGEN SATURATION: 97 % | HEART RATE: 67 BPM | TEMPERATURE: 98 F | WEIGHT: 205.03 LBS

## 2024-09-20 DIAGNOSIS — R06.02 SHORTNESS OF BREATH: ICD-10-CM

## 2024-09-20 DIAGNOSIS — R53.1 WEAKNESS: ICD-10-CM

## 2024-09-20 DIAGNOSIS — R05.1 ACUTE COUGH: ICD-10-CM

## 2024-09-20 DIAGNOSIS — R42 DIZZINESS: ICD-10-CM

## 2024-09-20 DIAGNOSIS — R42 VERTIGO: ICD-10-CM

## 2024-09-20 LAB
ALBUMIN SERPL BCP-MCNC: 5 G/DL (ref 3.2–4.9)
ALBUMIN/GLOB SERPL: 1.6 G/DL
ALP SERPL-CCNC: 73 U/L (ref 30–99)
ALT SERPL-CCNC: 18 U/L (ref 2–50)
ANION GAP SERPL CALC-SCNC: 15 MMOL/L (ref 7–16)
AST SERPL-CCNC: 23 U/L (ref 12–45)
BASOPHILS # BLD AUTO: 0.1 % (ref 0–1.8)
BASOPHILS # BLD: 0.01 K/UL (ref 0–0.12)
BILIRUB SERPL-MCNC: 0.8 MG/DL (ref 0.1–1.5)
BUN SERPL-MCNC: 12 MG/DL (ref 8–22)
CALCIUM ALBUM COR SERPL-MCNC: 9.5 MG/DL (ref 8.5–10.5)
CALCIUM SERPL-MCNC: 10.3 MG/DL (ref 8.5–10.5)
CHLORIDE SERPL-SCNC: 101 MMOL/L (ref 96–112)
CO2 SERPL-SCNC: 22 MMOL/L (ref 20–33)
CREAT SERPL-MCNC: 1.07 MG/DL (ref 0.5–1.4)
EKG IMPRESSION: NORMAL
EKG IMPRESSION: NORMAL
EOSINOPHIL # BLD AUTO: 0 K/UL (ref 0–0.51)
EOSINOPHIL NFR BLD: 0 % (ref 0–6.9)
ERYTHROCYTE [DISTWIDTH] IN BLOOD BY AUTOMATED COUNT: 39.5 FL (ref 35.9–50)
GFR SERPLBLD CREATININE-BSD FMLA CKD-EPI: 93 ML/MIN/1.73 M 2
GLOBULIN SER CALC-MCNC: 3.2 G/DL (ref 1.9–3.5)
GLUCOSE SERPL-MCNC: 116 MG/DL (ref 65–99)
HCT VFR BLD AUTO: 48.7 % (ref 42–52)
HGB BLD-MCNC: 16.3 G/DL (ref 14–18)
IMM GRANULOCYTES # BLD AUTO: 0.08 K/UL (ref 0–0.11)
IMM GRANULOCYTES NFR BLD AUTO: 1.1 % (ref 0–0.9)
LYMPHOCYTES # BLD AUTO: 1.26 K/UL (ref 1–4.8)
LYMPHOCYTES NFR BLD: 16.6 % (ref 22–41)
MCH RBC QN AUTO: 29.8 PG (ref 27–33)
MCHC RBC AUTO-ENTMCNC: 33.5 G/DL (ref 32.3–36.5)
MCV RBC AUTO: 89 FL (ref 81.4–97.8)
MONOCYTES # BLD AUTO: 0.16 K/UL (ref 0–0.85)
MONOCYTES NFR BLD AUTO: 2.1 % (ref 0–13.4)
NEUTROPHILS # BLD AUTO: 6.09 K/UL (ref 1.82–7.42)
NEUTROPHILS NFR BLD: 80.1 % (ref 44–72)
NRBC # BLD AUTO: 0 K/UL
NRBC BLD-RTO: 0 /100 WBC (ref 0–0.2)
PLATELET # BLD AUTO: 212 K/UL (ref 164–446)
PMV BLD AUTO: 11.2 FL (ref 9–12.9)
POTASSIUM SERPL-SCNC: 4.4 MMOL/L (ref 3.6–5.5)
PROT SERPL-MCNC: 8.2 G/DL (ref 6–8.2)
RBC # BLD AUTO: 5.47 M/UL (ref 4.7–6.1)
SODIUM SERPL-SCNC: 138 MMOL/L (ref 135–145)
TROPONIN T SERPL-MCNC: <6 NG/L (ref 6–19)
WBC # BLD AUTO: 7.6 K/UL (ref 4.8–10.8)

## 2024-09-20 PROCEDURE — 36415 COLL VENOUS BLD VENIPUNCTURE: CPT

## 2024-09-20 PROCEDURE — 80053 COMPREHEN METABOLIC PANEL: CPT

## 2024-09-20 PROCEDURE — 99284 EMERGENCY DEPT VISIT MOD MDM: CPT

## 2024-09-20 PROCEDURE — 99214 OFFICE O/P EST MOD 30 MIN: CPT

## 2024-09-20 PROCEDURE — 85025 COMPLETE CBC W/AUTO DIFF WBC: CPT

## 2024-09-20 PROCEDURE — 93005 ELECTROCARDIOGRAM TRACING: CPT

## 2024-09-20 PROCEDURE — 3074F SYST BP LT 130 MM HG: CPT

## 2024-09-20 PROCEDURE — 84484 ASSAY OF TROPONIN QUANT: CPT

## 2024-09-20 PROCEDURE — 93005 ELECTROCARDIOGRAM TRACING: CPT | Performed by: STUDENT IN AN ORGANIZED HEALTH CARE EDUCATION/TRAINING PROGRAM

## 2024-09-20 PROCEDURE — 3078F DIAST BP <80 MM HG: CPT

## 2024-09-20 RX ORDER — HYDROXYZINE HYDROCHLORIDE 50 MG/1
50 TABLET, FILM COATED ORAL ONCE
Status: DISCONTINUED | OUTPATIENT
Start: 2024-09-20 | End: 2024-09-20 | Stop reason: HOSPADM

## 2024-09-20 RX ORDER — MECLIZINE HYDROCHLORIDE 25 MG/1
25 TABLET ORAL 3 TIMES DAILY PRN
Qty: 30 TABLET | Refills: 0 | Status: SHIPPED | OUTPATIENT
Start: 2024-09-20 | End: 2024-09-23

## 2024-09-20 ASSESSMENT — ENCOUNTER SYMPTOMS
MYALGIAS: 0
SENSORY CHANGE: 0
ABDOMINAL PAIN: 0
PALPITATIONS: 0
FEVER: 0
SHORTNESS OF BREATH: 0
BLOOD IN STOOL: 0
SPUTUM PRODUCTION: 0
PHOTOPHOBIA: 0
FOCAL WEAKNESS: 0
EYE REDNESS: 0
CHILLS: 0
HEADACHES: 1
SINUS PAIN: 0
EYE PAIN: 0
BLURRED VISION: 1
EYE DISCHARGE: 0
STRIDOR: 0
DIZZINESS: 1
VOMITING: 0
SORE THROAT: 0
SPEECH CHANGE: 0
WEAKNESS: 0
NECK PAIN: 0
FLANK PAIN: 0
TINGLING: 0
NAUSEA: 1
WHEEZING: 0
SEIZURES: 0
COUGH: 1
LOSS OF CONSCIOUSNESS: 0
DIARRHEA: 0
DOUBLE VISION: 0

## 2024-09-20 ASSESSMENT — VISUAL ACUITY: OU: 1

## 2024-09-20 ASSESSMENT — HEART SCORE
HEART SCORE: 0
ECG: NORMAL
TROPONIN: LESS THAN OR EQUAL TO NORMAL LIMIT
HISTORY: SLIGHTLY SUSPICIOUS
RISK FACTORS: NO KNOWN RISK FACTORS
AGE: <45

## 2024-09-20 ASSESSMENT — FIBROSIS 4 INDEX
FIB4 SCORE: 0.78
FIB4 SCORE: 0.78

## 2024-09-20 NOTE — ED TRIAGE NOTES
"Chief Complaint   Patient presents with    Weakness     Patient reports he has been experiencing generalized weakness, worse on exertion, for approx 2 months.     Dizziness     Patient reports intermittent dizziness with occasional blurred vision. Patient describes it as \"the room is spinning\". Patient reports he has been experiencing chest tightness and SOB as well. States he was recently prescribed an oral steroid for \"chest congestion\" with some relief of symptoms.        33 yo male to triage for above complaint. Patient was seen at  today and sent to ED for further evaluation of symptoms.  EKG complete.     Pt is alert and oriented, speaking in full sentences, follows commands and responds appropriately to questions.     Patient placed back in lobby and educated on triage process. Asked to inform RN of any changes.    BP (!) 158/94   Pulse 72   Temp 37.1 °C (98.7 °F) (Temporal)   Resp 16   Ht 1.854 m (6' 1\")   Wt 93 kg (205 lb 0.4 oz)   SpO2 98%   BMI 27.05 kg/m²     "

## 2024-09-20 NOTE — PROGRESS NOTES
Subjective     Waldemar Ohara is a 34 y.o. male who presents with dizziness, fatigue, and weakness x2 weeks.     HPI:   Waldemar is a 35yo male presenting for dizziness, fatigue, and weakness x2 weeks. Patient was diagnosed with Covid-19 on 8/31/24. Patient reports ongoing dizziness and weakness with associated blurry vision, shortness of breath, cough, and nausea. He has been seen multiple times for these symptoms and has undergone a CXR and MRI of the brain with normal findings. Denies LOC or confusion. No numbness/tingling or sensation loss. Denies fever. No chest pain. Denies headache. No abdominal pain, vomiting, or diarrhea. Denies dysuria. No rash. Denies lower extremity swelling. No history of DVT/PE. He has attempted Meclizine for relief.        Review of Systems   Constitutional:  Positive for malaise/fatigue. Negative for chills and fever.   HENT:  Positive for congestion. Negative for ear discharge, ear pain, sinus pain and sore throat.    Eyes:  Positive for blurred vision. Negative for double vision, photophobia, pain, discharge and redness.   Respiratory:  Positive for cough. Negative for sputum production, shortness of breath, wheezing and stridor.    Cardiovascular:  Negative for chest pain, palpitations and leg swelling.   Gastrointestinal:  Positive for nausea. Negative for abdominal pain, blood in stool, diarrhea, melena and vomiting.   Genitourinary:  Negative for dysuria and flank pain.   Musculoskeletal:  Negative for myalgias and neck pain.   Skin:  Negative for rash.   Neurological:  Positive for dizziness and headaches. Negative for tingling, sensory change, speech change, focal weakness, seizures, loss of consciousness and weakness.     Past Medical History:   Diagnosis Date    COVID      History reviewed. No pertinent surgical history.     Patient has no known allergies.     Current Outpatient Medications:     multivitamin Tab, Take 1 Tablet by mouth every day., Disp: , Rfl:  "    Social History     Tobacco Use    Smoking status: Never    Smokeless tobacco: Never   Vaping Use    Vaping status: Never Used   Substance Use Topics    Alcohol use: Yes     Comment: rare    Drug use: Never     Family History   Problem Relation Age of Onset    No Known Problems Mother     Hypertension Father     Hyperlipidemia Father     Hypertension Brother     Obesity Brother     No Known Problems Maternal Grandmother     No Known Problems Maternal Grandfather     No Known Problems Paternal Grandmother     No Known Problems Paternal Grandfather       Medications, Allergies, and current problem list reviewed today in Epic.      Objective     /62 (BP Location: Left arm, Patient Position: Sitting, BP Cuff Size: Adult long)   Pulse 67   Temp 36.7 °C (98 °F) (Temporal)   Resp 18   Ht 1.854 m (6' 1\")   Wt 93 kg (205 lb 0.4 oz)   SpO2 97%   BMI 27.05 kg/m²      Physical Exam  Vitals reviewed.   Constitutional:       General: He is not in acute distress.  HENT:      Head: No right periorbital erythema or left periorbital erythema.      Jaw: There is normal jaw occlusion. No tenderness, swelling, pain on movement or malocclusion.      Right Ear: Tympanic membrane, ear canal and external ear normal.      Left Ear: Tympanic membrane, ear canal and external ear normal.      Nose: Nose normal.      Right Sinus: No maxillary sinus tenderness or frontal sinus tenderness.      Left Sinus: No maxillary sinus tenderness or frontal sinus tenderness.      Mouth/Throat:      Lips: No lesions.      Mouth: Mucous membranes are moist. No oral lesions or angioedema.      Tongue: No lesions. Tongue does not deviate from midline.      Palate: No mass and lesions.      Pharynx: Uvula midline. No oropharyngeal exudate, posterior oropharyngeal erythema or uvula swelling.   Eyes:      General: Vision grossly intact. Gaze aligned appropriately. No visual field deficit.     Extraocular Movements: Extraocular movements intact.      " Right eye: No nystagmus.      Left eye: No nystagmus.      Conjunctiva/sclera: Conjunctivae normal.      Pupils: Pupils are equal, round, and reactive to light.      Right eye: Pupil is round, reactive and not sluggish.      Left eye: Pupil is round, reactive and not sluggish.      Funduscopic exam:     Right eye: Red reflex present.         Left eye: Red reflex present.  Neck:      Trachea: Trachea normal. No abnormal tracheal secretions or tracheal deviation.   Cardiovascular:      Rate and Rhythm: Normal rate and regular rhythm.      Pulses: Normal pulses.      Heart sounds: Normal heart sounds.   Pulmonary:      Effort: Pulmonary effort is normal. No tachypnea, accessory muscle usage, prolonged expiration, respiratory distress or retractions.      Breath sounds: Normal breath sounds. No stridor, decreased air movement or transmitted upper airway sounds. No wheezing, rhonchi or rales.   Musculoskeletal:         General: Normal range of motion.      Cervical back: Full passive range of motion without pain, normal range of motion and neck supple. No erythema, rigidity or crepitus. No pain with movement. Normal range of motion.      Right lower leg: No edema.      Left lower leg: No edema.   Skin:     General: Skin is warm and dry.      Capillary Refill: Capillary refill takes less than 2 seconds.      Findings: No rash.   Neurological:      General: No focal deficit present.      Mental Status: He is alert. Mental status is at baseline.      Cranial Nerves: Cranial nerves 2-12 are intact. No dysarthria or facial asymmetry.      Sensory: Sensation is intact.      Motor: Motor function is intact. No weakness or abnormal muscle tone.      Coordination: Coordination is intact. Coordination normal. Finger-Nose-Finger Test normal.      Gait: Gait is intact.   Psychiatric:         Mood and Affect: Mood normal.         Behavior: Behavior normal.         Thought Content: Thought content normal.       Assessment & Plan      1. Dizziness     2. Weakness     3. Shortness of breath       MDM/Comments:   Patient presenting with worsening dizziness, weakness, and shortness of breath. Unable to rule out emergent cardiac pathology at this time, warranting transfer to higher level of care for further evaluation.   Patient verbalizes understanding and is in agreement with the plan of care.     Differential diagnosis, natural history, supportive care, and indications for immediate follow-up discussed.         Disposition:     Higher level care via private car in stable condition                       Electronically signed by MATILDA Rogers

## 2024-09-21 ENCOUNTER — HOSPITAL ENCOUNTER (EMERGENCY)
Facility: MEDICAL CENTER | Age: 34
End: 2024-09-21
Attending: EMERGENCY MEDICINE
Payer: COMMERCIAL

## 2024-09-21 ENCOUNTER — APPOINTMENT (OUTPATIENT)
Dept: RADIOLOGY | Facility: MEDICAL CENTER | Age: 34
End: 2024-09-21
Attending: EMERGENCY MEDICINE
Payer: COMMERCIAL

## 2024-09-21 VITALS
SYSTOLIC BLOOD PRESSURE: 132 MMHG | WEIGHT: 228.4 LBS | RESPIRATION RATE: 20 BRPM | HEART RATE: 64 BPM | HEIGHT: 76 IN | BODY MASS INDEX: 27.81 KG/M2 | TEMPERATURE: 98.8 F | DIASTOLIC BLOOD PRESSURE: 79 MMHG | OXYGEN SATURATION: 95 %

## 2024-09-21 DIAGNOSIS — R53.83 OTHER FATIGUE: ICD-10-CM

## 2024-09-21 DIAGNOSIS — R10.84 GENERALIZED ABDOMINAL PAIN: ICD-10-CM

## 2024-09-21 DIAGNOSIS — R07.89 CHEST PRESSURE: ICD-10-CM

## 2024-09-21 LAB
ALBUMIN SERPL BCP-MCNC: 4.6 G/DL (ref 3.2–4.9)
ALBUMIN/GLOB SERPL: 1.5 G/DL
ALP SERPL-CCNC: 67 U/L (ref 30–99)
ALT SERPL-CCNC: 13 U/L (ref 2–50)
ANION GAP SERPL CALC-SCNC: 14 MMOL/L (ref 7–16)
AST SERPL-CCNC: 13 U/L (ref 12–45)
BASOPHILS # BLD AUTO: 0.2 % (ref 0–1.8)
BASOPHILS # BLD: 0.02 K/UL (ref 0–0.12)
BILIRUB SERPL-MCNC: 1.2 MG/DL (ref 0.1–1.5)
BUN SERPL-MCNC: 15 MG/DL (ref 8–22)
CALCIUM ALBUM COR SERPL-MCNC: 9.3 MG/DL (ref 8.5–10.5)
CALCIUM SERPL-MCNC: 9.8 MG/DL (ref 8.4–10.2)
CHLORIDE SERPL-SCNC: 101 MMOL/L (ref 96–112)
CO2 SERPL-SCNC: 23 MMOL/L (ref 20–33)
CREAT SERPL-MCNC: 1.04 MG/DL (ref 0.5–1.4)
EOSINOPHIL # BLD AUTO: 0.01 K/UL (ref 0–0.51)
EOSINOPHIL NFR BLD: 0.1 % (ref 0–6.9)
ERYTHROCYTE [DISTWIDTH] IN BLOOD BY AUTOMATED COUNT: 38.7 FL (ref 35.9–50)
GFR SERPLBLD CREATININE-BSD FMLA CKD-EPI: 96 ML/MIN/1.73 M 2
GLOBULIN SER CALC-MCNC: 3.1 G/DL (ref 1.9–3.5)
GLUCOSE SERPL-MCNC: 103 MG/DL (ref 65–99)
HCT VFR BLD AUTO: 47.4 % (ref 42–52)
HETEROPH AB SER QL: NEGATIVE
HGB BLD-MCNC: 16.4 G/DL (ref 14–18)
IMM GRANULOCYTES # BLD AUTO: 0.05 K/UL (ref 0–0.11)
IMM GRANULOCYTES NFR BLD AUTO: 0.6 % (ref 0–0.9)
LIPASE SERPL-CCNC: 22 U/L (ref 11–82)
LYMPHOCYTES # BLD AUTO: 1.1 K/UL (ref 1–4.8)
LYMPHOCYTES NFR BLD: 13.4 % (ref 22–41)
MCH RBC QN AUTO: 30.8 PG (ref 27–33)
MCHC RBC AUTO-ENTMCNC: 34.6 G/DL (ref 32.3–36.5)
MCV RBC AUTO: 89.1 FL (ref 81.4–97.8)
MONOCYTES # BLD AUTO: 0.37 K/UL (ref 0–0.85)
MONOCYTES NFR BLD AUTO: 4.5 % (ref 0–13.4)
NEUTROPHILS # BLD AUTO: 6.68 K/UL (ref 1.82–7.42)
NEUTROPHILS NFR BLD: 81.2 % (ref 44–72)
NRBC # BLD AUTO: 0 K/UL
NRBC BLD-RTO: 0 /100 WBC (ref 0–0.2)
PLATELET # BLD AUTO: 189 K/UL (ref 164–446)
PMV BLD AUTO: 11.3 FL (ref 9–12.9)
POTASSIUM SERPL-SCNC: 4.2 MMOL/L (ref 3.6–5.5)
PROT SERPL-MCNC: 7.7 G/DL (ref 6–8.2)
RBC # BLD AUTO: 5.32 M/UL (ref 4.7–6.1)
SODIUM SERPL-SCNC: 138 MMOL/L (ref 135–145)
TROPONIN T SERPL-MCNC: <6 NG/L (ref 6–19)
WBC # BLD AUTO: 8.2 K/UL (ref 4.8–10.8)

## 2024-09-21 PROCEDURE — 36415 COLL VENOUS BLD VENIPUNCTURE: CPT

## 2024-09-21 PROCEDURE — 86308 HETEROPHILE ANTIBODY SCREEN: CPT

## 2024-09-21 PROCEDURE — 84484 ASSAY OF TROPONIN QUANT: CPT

## 2024-09-21 PROCEDURE — 700117 HCHG RX CONTRAST REV CODE 255: Performed by: EMERGENCY MEDICINE

## 2024-09-21 PROCEDURE — 85025 COMPLETE CBC W/AUTO DIFF WBC: CPT

## 2024-09-21 PROCEDURE — 93005 ELECTROCARDIOGRAM TRACING: CPT | Performed by: EMERGENCY MEDICINE

## 2024-09-21 PROCEDURE — 93005 ELECTROCARDIOGRAM TRACING: CPT

## 2024-09-21 PROCEDURE — 82607 VITAMIN B-12: CPT

## 2024-09-21 PROCEDURE — 80053 COMPREHEN METABOLIC PANEL: CPT

## 2024-09-21 PROCEDURE — 83690 ASSAY OF LIPASE: CPT

## 2024-09-21 PROCEDURE — 74177 CT ABD & PELVIS W/CONTRAST: CPT

## 2024-09-21 PROCEDURE — 94760 N-INVAS EAR/PLS OXIMETRY 1: CPT

## 2024-09-21 PROCEDURE — 99284 EMERGENCY DEPT VISIT MOD MDM: CPT

## 2024-09-21 RX ADMIN — IOHEXOL 100 ML: 350 INJECTION, SOLUTION INTRAVENOUS at 21:48

## 2024-09-21 ASSESSMENT — FIBROSIS 4 INDEX: FIB4 SCORE: 0.87

## 2024-09-21 NOTE — ED PROVIDER NOTES
"  ER Provider Note    Scribed for Gail Schwab M.D. by Binu Lang. 9/20/2024   6:38 PM    Primary Care Provider: [facility]    CHIEF COMPLAINT  Chief Complaint   Patient presents with    Weakness     Patient reports he has been experiencing generalized weakness, worse on exertion, for approx 2 months.     Dizziness     Patient reports intermittent dizziness with occasional blurred vision. Patient describes it as \"the room is spinning\". Patient reports he has been experiencing chest tightness and SOB as well. States he was recently prescribed an oral steroid for \"chest congestion\" with some relief of symptoms.      EXTERNAL RECORDS REVIEWED  Outpatient Notes seen in urgent care earlier today given dyspnea and dizziness sent to ED for further evaluation/higher level care    HPI/ROS  LIMITATION TO HISTORY   None noted   OUTSIDE HISTORIAN(S):  None noted     Waldemar Ohara is a 34 y.o. male who presents to the ED for evaluation of ongoing dizziness and fatigue. Patient reports weakness for past \"few months.\" Patient denies shortness of breath during activity but notes fatigue. Patient notes that today he woke up today with vertigo. Patient note his vertigo today caused him to have difficulty keeping his balance when standing up, says the room also \"kind of spins.\" Patient notes his vertigo is slightly alleviated by laying flat and still symptoms persist for seconds with movement resolved after seconds of, says sleeping sideways causes him difficulty.  Symptoms persist after seconds of movement, resolved after seconds of stillness.  Patient denies ear pain. Patient reports intermittent chest tightness and non productive cough, which he first noticed a couple of days ago. Patient denies fever or chills. Patient notes his symptoms first began with confusion, sore throat, and fatigue over 2 months ago. Patient notes intermittent blurry vision and near syncope since then. Patient notes he " takes oral steroids for previously diagnosed chest congestion. Denies history of blood clots. No family history of blood clots. Patient reports a family history of hypertension and diabetes. Patient denies recent trauma, surgery, or travel. Patient notes he was diagnosed with Covid one month ago. Patient denies recent leg swelling. Patient does not take testosterone. Patient says his symptoms began 2 months ago after moving homes and notes worry of black mold allergy. Patient notes she was seen at his eye doctor recently who noted he may be predisposed for colon cancer based on an eye exam finding.     PAST MEDICAL HISTORY  Past Medical History:   Diagnosis Date    COVID        SURGICAL HISTORY  History reviewed. No pertinent surgical history.    FAMILY HISTORY  Family History   Problem Relation Age of Onset    No Known Problems Mother     Hypertension Father     Hyperlipidemia Father     Hypertension Brother     Obesity Brother     No Known Problems Maternal Grandmother     No Known Problems Maternal Grandfather     No Known Problems Paternal Grandmother     No Known Problems Paternal Grandfather        SOCIAL HISTORY   reports that he has never smoked. He has never used smokeless tobacco. He reports current alcohol use. He reports that he does not use drugs.    CURRENT MEDICATIONS  Discharge Medication List as of 9/20/2024  8:02 PM        CONTINUE these medications which have NOT CHANGED    Details   methylPREDNISolone (MEDROL DOSEPAK) 4 MG Tablet Therapy Pack Follow schedule on package instructions., Disp-21 Tablet, R-0, Normal      ciprofloxacin/dexamethasone (CIPRODEX) 0.3-0.1 % Suspension Apply 4 drops twice daily to the affected ear(s) x7 days, Disp-7.5 mL, R-0, Normal      meclizine (ANTIVERT) 25 MG Tab Take 1 Tablet by mouth 3 times a day as needed for Vertigo., Disp-30 Tablet, R-0, Normal      hydrOXYzine HCl (ATARAX) 25 MG Tab Take 1 Tablet by mouth 3 times a day as needed for Anxiety., Disp-30 Tablet,  "R-1, Normal      multivitamin Tab Take 1 Tablet by mouth every day., Historical Med             ALLERGIES  No Known Allergies     PHYSICAL EXAM  BP (!) 158/94   Pulse 72   Temp 37.1 °C (98.7 °F) (Temporal)   Resp 16   Ht 1.854 m (6' 1\")   Wt 93 kg (205 lb 0.4 oz)   SpO2 98%   BMI 27.05 kg/m²    General: Alert and oriented male, anxious appearing.  Head: Normocephalic atraumatic  Eyes: Extraocular motion intact  Neck: Supple, no rigidity  Cardiovascular: Regular rate and rhythm no murmurs rubs or gallops  Respiratory: Occasional dry cough. Clear to auscultation bilaterally, equal chest rise and fall, no increased work of breathing. PERC rule negative.  Abdomen: Soft nontender no guarding  Musculoskeletal: Warm and well perfused, no peripheral edema. No external evidence of DVT  Neuro: Alert, no focal deficits  Integumentary: No wounds or rashes     DIAGNOSTIC STUDIES    Labs:   Labs Reviewed   CBC WITH DIFFERENTIAL - Abnormal; Notable for the following components:       Result Value    Neutrophils-Polys 80.10 (*)     Lymphocytes 16.60 (*)     Immature Granulocytes 1.10 (*)     All other components within normal limits   COMP METABOLIC PANEL - Abnormal; Notable for the following components:    Glucose 116 (*)     Albumin 5.0 (*)     All other components within normal limits   TROPONIN   ESTIMATED GFR   EKG:   I have independently interpreted this EKG as detailed above.      Results for orders placed or performed during the hospital encounter of 24   EKG   Result Value Ref Range    Report       [facility]    Test Date:  2024  Pt Name:    MAYO BOWSER       Department: ER  MRN:        [id number]                      Room:  Gender:     Male                         Technician: [id number]  :        1990                   Requested By:ER TRIAGE PROTOCOL  Order #:    [id number]                    Reading MD: Dwain Schwab    Measurements  Intervals              "                   Axis  Rate:       71                           P:          68  CT:         143                          QRS:        102  QRSD:       103                          T:          55  QT:         374  QTc:        407    Interpretive Statements  Sinus rhythm, rate of 71, significant artifact limits interpretation.  No  obvious ST elevation.  Right axis deviation is appreciated.  Electronically Signed On 2024 18:40:19 PDT by Dwain Crooks     EKG   Result Value Ref Range    Report       [facility] Emergency Dept.    Test Date:  2024  Pt Name:    MAYO BOWSER       Department: ER  MRN:        [id number]                      Room:       CT 81  Gender:     Male                         Technician: [id number]  :        1990                   Requested By:DWAIN CROOKS  Order #:    [id number]                    Reading MD: Dwain Crooks    Measurements  Intervals                                Axis  Rate:       67                           P:          75  CT:         153                          QRS:        89  QRSD:       98                           T:          59  QT:         388  QTc:        410    Interpretive Statements  Sinus rhythm, rate of 67, normal axis, normal intervals, no ST elevation.  Electronically Signed On 2024 20:55:52 PDT by Dwain Crooks         INITIAL ASSESSMENT COURSE AND PLAN  Care Narrative     6:38 PM - Patient presents to the ED for evaluation of dizziness. Patient was evaluated at bedside. Ordered for EKG and labwork to evaluate. The patient will be medicated as ordered for his symptoms. Patient verbalizes understanding and support with my plan of care.  Differential diagnoses include but not limited to: dehydration, electrolyte abnormality, anxiety, atypical ACS, pneumonia, arrhythmia, asthma, postviral complications, PE, orthostasis, POTS.  PERC rule negative.      Troponin negative, labs are overall unremarkable.  EKG is  reassuring.  Patient has trace neutrophilic predominance he was started on steroids 3 days ago is the likely cause doubt infectious process.    Patient's vertiginous symptoms are of a peripheral process given is present with movement only, resolves quickly, suspect BPPV.    I reviewed the chest x-ray from recent urgent care evaluation, it is unremarkable no evidence of pneumonia he is clear to auscultation I doubt infection.      8:04 PM - Reviewed patient's laboratory, imaging results at the bedside, patient is agreeable to discharge, we discussed strict return precautions, and outpatient follow-up, patient was discharged in no acute distress.  All questions were answered prior to discharge.     CHEST PAIN:   HEART Score for Major Cardiac Events  HEART Score     History: Slightly suspicious  ECG: Normal  Age: <45  Risk Factors: No known risk factors  Troponin: Less than or equal to normal limit    Heart Score: 0    Total Score   0-3 Points = Low Score, risk of MACE 0.9-1.7%.  4-6 Points = Moderate Score, risk of MACE 12-16.6%  7-10 Points = High Score, risk of MACE 50-65%      DISPOSITION AND DISCUSSIONS    I have discussed management of the patient with the following physicians and DRE's:  None noted     Discussion of management with other QHP or appropriate source(s): None     Escalation of care considered, and ultimately not performed: acute inpatient care management, however at this time, the patient is most appropriate for outpatient management.    Decision tools and prescription drugs considered including, but not limited to:  Meclizine for vertigo .    The patient will return for new or worsening symptoms and is stable at the time of discharge.    DISPOSITION:  Patient will be discharged home in stable condition.    FOLLOW UP:  PCP    OUTPATIENT MEDICATIONS:  Discharge Medication List as of 9/20/2024  8:02 PM           FINAL DIAGNOSIS  1. Acute cough    2. Weakness    3. Shortness of breath    4. Vertigo          I, Binu Lang (Nathan), am scribing for, and in the presence of, Dwain Schwab M.D..    Electronically signed by: Binu Lang (Nathan), 9/20/2024    IDwain M.D. personally performed the services described in this documentation, as scribed by Binu Lang in my presence, and it is both accurate and complete.      The note accurately reflects work and decisions made by me.  Dwain Schwab M.D.  9/20/2024  8:59 PM

## 2024-09-22 LAB
EKG IMPRESSION: NORMAL
VIT B12 SERPL-MCNC: 1023 PG/ML (ref 211–911)

## 2024-09-22 ASSESSMENT — HEART SCORE
AGE: <45
TROPONIN: LESS THAN OR EQUAL TO NORMAL LIMIT
ECG: NORMAL
HEART SCORE: 0
HISTORY: SLIGHTLY SUSPICIOUS
RISK FACTORS: NO KNOWN RISK FACTORS

## 2024-09-22 NOTE — ED TRIAGE NOTES
Pt comes in by himself  c/o abdomen pain that started today and CP that he has been having since yesterday    was seen at [facility] for same  they did not find the cause of these complaints   pt concerned due to he continues to have CP and abdomen pain now he c/o loss of appetite  has no desire to eat  no family Hx of cardiac however does have Ca in family Hx  is concerned that is his issue

## 2024-09-22 NOTE — DISCHARGE INSTRUCTIONS
Return the emergency department if you have increasing abdominal pain, pain moves the right lower quadrant, severe vomiting, new or different chest pain or cough up blood.

## 2024-09-22 NOTE — ED PROVIDER NOTES
ED Provider Note    CHIEF COMPLAINT  Chief Complaint   Patient presents with    Abdominal Pain     Started today       Chest Pain     Started yesterday   was seen at [facility] for same  nothing truly was found  continues to have pain  and now loss of appetite     Loss of Appetite       EXTERNAL RECORDS REVIEWED  Inpatient Notes patient seen emergency department yesterday had cardiac workup with negative enzymes.  and Outpatient Notes seen by primary care  yesterday as well patient was referred to the emergency department because of shortness of breath and weakness.  Patient has undergone an MRI back inAugust 7 which was read as normal.    HPI/ROS  LIMITATION TO HISTORY   Select: : None  OUTSIDE HISTORIAN(S):  None    Waldemar Ohara is a 34 y.o. male who presents abdominal pain which he describes as more cramping sensation as well as decreased appetite throughout the day.  Patient states he has been having this kind of abdominal pain just today.  Pain has not moved anywhere.  He states he felt like he had some acid reflux with a sour  taste in his mouth with a feeling of fluid in the back of his throat.  This is since improved.    Patient also states that he is had occasional chest tightness since yesterday.  Described as tightness over the left chest.  Denies any history of cardiac disease, shortness of breath, he has had a occasional cough for the last several months.  He describes this as a dry cough.    He also states he has had chronic fatigue over the last several months.  He has seen multiple urgent cares as well as primary care with no answers.  He has been told that he has long COVID.  But he states he got COVID just in August and therefore he does not understand if he had long COVID however he could get COVID again.  He states that he thinks he may have cancer and that is what really is him scared.  He states he has a family history of cancer although he does not know what type.    PAST MEDICAL HISTORY    "has a past medical history of COVID.    SURGICAL HISTORY  patient denies any surgical history    FAMILY HISTORY  Family History   Problem Relation Age of Onset    No Known Problems Mother     Hypertension Father     Hyperlipidemia Father     Hypertension Brother     Obesity Brother     No Known Problems Maternal Grandmother     No Known Problems Maternal Grandfather     No Known Problems Paternal Grandmother     No Known Problems Paternal Grandfather        SOCIAL HISTORY  Social History     Tobacco Use    Smoking status: Never    Smokeless tobacco: Never   Vaping Use    Vaping status: Never Used   Substance and Sexual Activity    Alcohol use: Yes     Comment: rare    Drug use: Never    Sexual activity: Not Currently     Partners: Female     Birth control/protection: None       CURRENT MEDICATIONS  Home Medications       Reviewed by Slime Hart R.N. (Registered Nurse) on 09/21/24 at 1901  Med List Status: Partial     Medication Last Dose Status   ciprofloxacin/dexamethasone (CIPRODEX) 0.3-0.1 % Suspension  Active   hydrOXYzine HCl (ATARAX) 25 MG Tab  Active   meclizine (ANTIVERT) 25 MG Tab  Active   methylPREDNISolone (MEDROL DOSEPAK) 4 MG Tablet Therapy Pack  Active   multivitamin Tab  Active                    ALLERGIES  No Known Allergies    PHYSICAL EXAM  VITAL SIGNS: /79   Pulse 64   Temp 37.1 °C (98.8 °F) (Temporal)   Resp 20   Ht 1.93 m (6' 4\")   Wt 104 kg (228 lb 6.3 oz)   SpO2 95%   BMI 27.80 kg/m²    Constitutional: Well developed, Well nourished, mild distress.  Slightly anxious  HENT: Normocephalic, Atraumatic.   Eyes: Conjunctiva normal, No discharge.   Cardiovascular: Normal heart rate, Normal rhythm, No murmurs, equal pulses.   Pulmonary: Normal breath sounds, No respiratory distress, No wheezing, No rales, No rhonchi.  Chest: No chest wall tenderness or deformity.   Abdomen:Soft, mild tenderness in the periumbilical region, no pain or tenderness of McBurney's point, negative " Vergara sign., No masses, no rebound, no guarding.   Back: No CVA tenderness.   Musculoskeletal: No major deformities noted, No tenderness.   Skin: Warm, Dry, No erythema, No rash.   Neurologic: Alert & oriented x 3, Normal motor function,  No focal deficits noted.   Psychiatric: Affect normal, Judgment normal, Mood normal.       EKG/LABS  Results for orders placed or performed during the hospital encounter of 09/21/24   CBC WITH DIFFERENTIAL   Result Value Ref Range    WBC 8.2 4.8 - 10.8 K/uL    RBC 5.32 4.70 - 6.10 M/uL    Hemoglobin 16.4 14.0 - 18.0 g/dL    Hematocrit 47.4 42.0 - 52.0 %    MCV 89.1 81.4 - 97.8 fL    MCH 30.8 27.0 - 33.0 pg    MCHC 34.6 32.3 - 36.5 g/dL    RDW 38.7 35.9 - 50.0 fL    Platelet Count 189 164 - 446 K/uL    MPV 11.3 9.0 - 12.9 fL    Neutrophils-Polys 81.20 (H) 44.00 - 72.00 %    Lymphocytes 13.40 (L) 22.00 - 41.00 %    Monocytes 4.50 0.00 - 13.40 %    Eosinophils 0.10 0.00 - 6.90 %    Basophils 0.20 0.00 - 1.80 %    Immature Granulocytes 0.60 0.00 - 0.90 %    Nucleated RBC 0.00 0.00 - 0.20 /100 WBC    Neutrophils (Absolute) 6.68 1.82 - 7.42 K/uL    Lymphs (Absolute) 1.10 1.00 - 4.80 K/uL    Monos (Absolute) 0.37 0.00 - 0.85 K/uL    Eos (Absolute) 0.01 0.00 - 0.51 K/uL    Baso (Absolute) 0.02 0.00 - 0.12 K/uL    Immature Granulocytes (abs) 0.05 0.00 - 0.11 K/uL    NRBC (Absolute) 0.00 K/uL   COMP METABOLIC PANEL   Result Value Ref Range    Sodium 138 135 - 145 mmol/L    Potassium 4.2 3.6 - 5.5 mmol/L    Chloride 101 96 - 112 mmol/L    Co2 23 20 - 33 mmol/L    Anion Gap 14.0 7.0 - 16.0    Glucose 103 (H) 65 - 99 mg/dL    Bun 15 8 - 22 mg/dL    Creatinine 1.04 0.50 - 1.40 mg/dL    Calcium 9.8 8.4 - 10.2 mg/dL    Correct Calcium 9.3 8.5 - 10.5 mg/dL    AST(SGOT) 13 12 - 45 U/L    ALT(SGPT) 13 2 - 50 U/L    Alkaline Phosphatase 67 30 - 99 U/L    Total Bilirubin 1.2 0.1 - 1.5 mg/dL    Albumin 4.6 3.2 - 4.9 g/dL    Total Protein 7.7 6.0 - 8.2 g/dL    Globulin 3.1 1.9 - 3.5 g/dL    A-G Ratio  1.5 g/dL   LIPASE   Result Value Ref Range    Lipase 22 11 - 82 U/L   TROPONIN   Result Value Ref Range    Troponin T <6 6 - 19 ng/L   MONONUCLEOSIS TEST QUAL   Result Value Ref Range    Heterophile Screen Negative Negative   ESTIMATED GFR   Result Value Ref Range    GFR (CKD-EPI) 96 >60 mL/min/1.73 m 2   EKG   Result Value Ref Range    Report       [facility] Emergency Dept.    Test Date:  2024  Pt Name:    MAYO BOWSER       Department: [facility]  MRN:        [id number]                      Room:  Gender:     Male                         Technician: [id number]  :        1990                   Requested By:ER TRIAGE PROTOCOL  Order #:    [id number]                    Reading MD: DREW LIMA MD    Measurements  Intervals                                Axis  Rate:       71                           P:          78  KY:         154                          QRS:        93  QRSD:       95                           T:          57  QT:         368  QTc:        400    Interpretive Statements  Sinus rhythm, Rate of 71, normal axis, no ST elevation  Borderline right axis deviation  Compared to ECG 2024 19:01:48  ST (T wave) deviation no longer present  Electronically Signed On 2024 00:29:11 PDT by DREW LIMA MD         I have independently interpreted this EKG    RADIOLOGY/PROCEDURES   I have independently interpreted the diagnostic imaging associated with this visit and am waiting the final reading from the radiologist.   My preliminary interpretation is as follows: CT abdomen pelvis unremarkable    Radiologist interpretation:  CT-ABDOMEN-PELVIS WITH   Final Result         1. There is no evidence for obstructive uropathy.   2. There is no evidence for bowel obstruction, diverticulitis, or appendicitis.   3. Tiny left inguinal hernia containing fat only.          COURSE & MEDICAL DECISION MAKING    ASSESSMENT, COURSE AND PLAN  Care Narrative: Patient  presents to the emergency department with chief complaint abdominal pain.  Patient was really concerned that he may be having some type of intra-abdominal cancer.  Will obtain a CT abdomen pelvis.  Patient also complains of continued chest pressure he had a negative workup last night we will obtain a troponin and EKG.    CHEST PAIN:   HEART Score for Major Cardiac Events  HEART Score     History: Slightly suspicious  ECG: Normal  Age: <45  Risk Factors: No known risk factors  Troponin: Less than or equal to normal limit    Heart Score: 0    Total Score   0-3 Points = Low Score, risk of MACE 0.9-1.7%.  4-6 Points = Moderate Score, risk of MACE 12-16.6%  7-10 Points = High Score, risk of MACE 50-65%     Discussed imaging and lab results with the patient at this point they are there all unremarkable      PROBLEMS MANAGED  Patient presents with fatigue, abdominal pain and chest pain.  At this point time as far as his chest pain is concerned I do not think he is having a myocardial infarction.  By PERC he is negative for pulmonary embolism.  The chest pain is not ripping or tearing does not go to her back I do not think that the patient is having aortic dissection.    Abdominal pain at this point time patient's abdominal pain is gone his imaging does not show any acute appendicitis or other surgical abdominal complication.  His labs are all unremarkable I think he can be discharged home.    Problem #3 fatigue at this point time I do not have a clear etiology for this patient has had thyroid studies already done.  Mono studies done and is negative.     DISPOSITION AND DISCUSSIONS  I have discussed management of the patient with the following physicians and DRE's: None    Discussion of management with other [facility] or appropriate source(s): None       Decision tools and prescription drugs considered including, but not limited to:  Heart score 0 .   The patient will return for new or worsening symptoms and is stable at the time  of discharge.    The patient is referred to a primary physician for blood pressure management, diabetic screening, and for all other preventative health concerns.        DISPOSITION:  Patient will be discharged home in stable condition.    FOLLOW UP:  BRENTON Somers  [address] [address]  [phone]    Schedule an appointment as soon as possible for a visit in 3 days        OUTPATIENT MEDICATIONS:  Discharge Medication List as of 9/21/2024 10:58 PM          FINAL DIAGNOSIS  1. Generalized abdominal pain    2. Chest pressure    3. Other fatigue         Electronically signed by: Elfego Krishna M.D., 9/21/2024 7:17 PM    This record was made with a voice recognition software. The software is not perfect. I have tried to correct any grammar, spelling or context errors to the best of my ability, but errors may still remain. Interpretation of this chart should be taken in this context.

## 2024-09-23 ENCOUNTER — APPOINTMENT (OUTPATIENT)
Dept: MEDICAL GROUP | Facility: PHYSICIAN GROUP | Age: 34
End: 2024-09-23
Payer: COMMERCIAL

## 2024-09-23 ENCOUNTER — OFFICE VISIT (OUTPATIENT)
Dept: URGENT CARE | Facility: PHYSICIAN GROUP | Age: 34
End: 2024-09-23
Payer: COMMERCIAL

## 2024-09-23 ENCOUNTER — APPOINTMENT (OUTPATIENT)
Dept: URGENT CARE | Facility: PHYSICIAN GROUP | Age: 34
End: 2024-09-23
Payer: COMMERCIAL

## 2024-09-23 VITALS
WEIGHT: 222.4 LBS | RESPIRATION RATE: 13 BRPM | HEART RATE: 91 BPM | HEIGHT: 74 IN | OXYGEN SATURATION: 99 % | BODY MASS INDEX: 28.54 KG/M2 | SYSTOLIC BLOOD PRESSURE: 128 MMHG | DIASTOLIC BLOOD PRESSURE: 86 MMHG | TEMPERATURE: 98.4 F

## 2024-09-23 DIAGNOSIS — R41.89 BRAIN FOG: ICD-10-CM

## 2024-09-23 DIAGNOSIS — H53.8 BLURRED VISION: ICD-10-CM

## 2024-09-23 DIAGNOSIS — R53.83 FATIGUE, UNSPECIFIED TYPE: ICD-10-CM

## 2024-09-23 DIAGNOSIS — R45.89 ANXIETY ABOUT HEALTH: ICD-10-CM

## 2024-09-23 DIAGNOSIS — R63.0 LOSS OF APPETITE: ICD-10-CM

## 2024-09-23 PROCEDURE — 3079F DIAST BP 80-89 MM HG: CPT | Performed by: STUDENT IN AN ORGANIZED HEALTH CARE EDUCATION/TRAINING PROGRAM

## 2024-09-23 PROCEDURE — 99215 OFFICE O/P EST HI 40 MIN: CPT | Performed by: STUDENT IN AN ORGANIZED HEALTH CARE EDUCATION/TRAINING PROGRAM

## 2024-09-23 PROCEDURE — 3074F SYST BP LT 130 MM HG: CPT | Performed by: STUDENT IN AN ORGANIZED HEALTH CARE EDUCATION/TRAINING PROGRAM

## 2024-09-23 ASSESSMENT — ENCOUNTER SYMPTOMS
PALPITATIONS: 0
EYE REDNESS: 0
WHEEZING: 0
HEADACHES: 0
WEAKNESS: 1
CHILLS: 0
SHORTNESS OF BREATH: 0
EYE DISCHARGE: 0
PHOTOPHOBIA: 0
COUGH: 0
BLURRED VISION: 1
DOUBLE VISION: 0
NERVOUS/ANXIOUS: 1
FEVER: 0
EYE PAIN: 0

## 2024-09-23 ASSESSMENT — VISUAL ACUITY
OS_CC: 20/20
OD_CC: 20/25
OU: 1

## 2024-09-23 ASSESSMENT — FIBROSIS 4 INDEX: FIB4 SCORE: 0.65

## 2024-09-23 NOTE — PROGRESS NOTES
"Subjective     Waldemar Ohara is a 34 y.o. male who presents with Fatigue (Weakness x 2 months)            Waldemar is a 34 y.o. male who presents to urgent care for for fatigue, weakness and loss of appetite.  Symptoms have been present for the last 2 months.  Patient was supposed to have an appointment with his PCP today and appointment was canceled so he decided to come into urgent care.  Patient was recently seen in the ER and was told to follow-up on Monday morning.  Patient states because his PCP appointments was canceled he wanted to be seen because ER said he needed to be seen on Monday morning.  Patient states he has had symptoms for 2 months of fatigue, weakness, loss of appetite, lightheadedness, blurred vision, brain fog.  Patient was recently seen in the ER and saw that his labs were abnormal which made him concerned for cancer.  Patient would like diagnostic testing to rule out cancer as he thinks cancer may be causing his symptoms. He is more concerned for bone cancer and leukemia. Patient states blurred vision is when he stares at his phone too closely. Patient feels like his fatigue is worsening. Patient states fatigue/weakness has made him unable to take care of  baby at home.        Review of Systems   Constitutional:  Positive for malaise/fatigue. Negative for chills and fever.   Eyes:  Positive for blurred vision. Negative for double vision, photophobia, pain, discharge and redness.   Respiratory:  Negative for cough, shortness of breath and wheezing.    Cardiovascular:  Negative for chest pain and palpitations.   Neurological:  Positive for weakness. Negative for headaches.   Psychiatric/Behavioral:  The patient is nervous/anxious.    All other systems reviewed and are negative.             Objective     /86   Pulse 91   Temp 36.9 °C (98.4 °F) (Temporal)   Resp 13   Ht 1.867 m (6' 1.5\")   Wt 101 kg (222 lb 6.4 oz)   SpO2 99%   BMI 28.94 kg/m²      Physical Exam  Vitals " reviewed.   Constitutional:       General: He is not in acute distress.     Appearance: Normal appearance. He is not ill-appearing, toxic-appearing or diaphoretic.   HENT:      Head: Normocephalic and atraumatic.      Nose: Nose normal.   Eyes:      General: Lids are normal. Vision grossly intact. Gaze aligned appropriately.      Extraocular Movements: Extraocular movements intact.      Conjunctiva/sclera: Conjunctivae normal.      Pupils: Pupils are equal, round, and reactive to light.      Comments: Visual acuity in clinic: R: 20/25 L: 20/20 B: 20/20   Cardiovascular:      Rate and Rhythm: Normal rate.   Pulmonary:      Effort: Pulmonary effort is normal.   Skin:     General: Skin is warm and dry.   Neurological:      General: No focal deficit present.      Mental Status: He is alert and oriented to person, place, and time.      GCS: GCS eye subscore is 4. GCS verbal subscore is 5. GCS motor subscore is 6.      Cranial Nerves: Cranial nerves 2-12 are intact.      Sensory: Sensation is intact.      Motor: Motor function is intact.      Coordination: Coordination is intact.      Gait: Gait is intact.                             Assessment & Plan        Assessment & Plan  Anxiety about health    Orders:    Referral to Behavioral Health    Fatigue, unspecified type         Loss of appetite         Brain fog         Blurred vision              Multiple urgent care, ER and PCP visits for similar symptoms with unremarkable labs/imaging.    8/4/24 (UC): fatigue, brain fog, lightheaded  8/5/24 (ER): lightheadness, brainfog  8/7/24 (ER): dizziness, paresthesia  8/14/24 (ER): lightheadedness, blurred vision  8/15/24 (PCP): annual wellness/hospital f/u  8/17/24 (UC): blurred vision, fatigue  8/21/24 (PCP): lightheadness, blurred vision, long covid  8/26/24 (ER): lightheadedness, sore throat  8/31/24 (UC): sore throat, dizziness, brain fog  9/9/24 (UC): dizziness, blurred vision  9/17/24 (UC): blurred vision, dizziness, SOB,  "chest tightness  9/20/24 (UC): dizziness, weakness, SOB  9/20/24 (ER): dizziness, weakness, SOB  9/21/24 (ER): abdominal pain, chest pain, loss of appetite  TODAY, 9/23/24 (UC): faitigue, weakness, loss of appetite    Visual acuity in clinic: R: 20/25 L: 20/20 B: 20/20  States blurred vision is when he is starring at his phone. Recommended optometrist follow up but he states he was there a month ago and the optometrist said his eyes were fine. He did mention the optometrist making a comment about an exam finding and could happen with colon cancer?   Advised ER transfer if experiencing visual changes and states he would like to \"go home and think about it.\"    Patient reports concern for cancer and would like to r/o possible cancer. States symptoms of fatigue and weakness are worsening. Vital signs are stable. Physical exam unremarkable.     Follow up with PCP as scheduled. Appointment with PCP was supposed to be today but was re-scheduled for Thursday (8/26/24). ER precautions discussed.    I personally reviewed prior external notes and test results pertinent to today's visit.    Differential diagnoses, supportive care measures and indications for immediate follow-up discussed with patient. Pathogenesis of diagnosis discussed including typical length and natural progression.      Instructed to return to urgent care or nearest emergency department if symptoms fail to improve, for any change in condition, further concerns, or new concerning symptoms.    Patient states understanding and agrees with the plan of care and discharge instructions.               My total time spent caring for the patient on the day of the encounter was 45 minutes including reviewing patients chart, obtaining patient history, physical exam, discussing plan of care, supportive care measures, appropriate follow-up and indications for immediate follow-up. This does not include time spent on separately billable procedures/tests.                 "

## 2024-09-24 ENCOUNTER — APPOINTMENT (OUTPATIENT)
Dept: RADIOLOGY | Facility: MEDICAL CENTER | Age: 34
End: 2024-09-24
Attending: EMERGENCY MEDICINE
Payer: COMMERCIAL

## 2024-09-24 ENCOUNTER — HOSPITAL ENCOUNTER (EMERGENCY)
Facility: MEDICAL CENTER | Age: 34
End: 2024-09-24
Attending: EMERGENCY MEDICINE
Payer: COMMERCIAL

## 2024-09-24 VITALS
SYSTOLIC BLOOD PRESSURE: 157 MMHG | OXYGEN SATURATION: 95 % | HEART RATE: 66 BPM | TEMPERATURE: 98 F | RESPIRATION RATE: 17 BRPM | DIASTOLIC BLOOD PRESSURE: 98 MMHG

## 2024-09-24 DIAGNOSIS — R53.83 FATIGUE, UNSPECIFIED TYPE: ICD-10-CM

## 2024-09-24 LAB
ALBUMIN SERPL BCP-MCNC: 4.6 G/DL (ref 3.2–4.9)
ALBUMIN/GLOB SERPL: 1.5 G/DL
ALP SERPL-CCNC: 69 U/L (ref 30–99)
ALT SERPL-CCNC: 12 U/L (ref 2–50)
ANION GAP SERPL CALC-SCNC: 13 MMOL/L (ref 7–16)
AST SERPL-CCNC: 14 U/L (ref 12–45)
BASOPHILS # BLD AUTO: 0.5 % (ref 0–1.8)
BASOPHILS # BLD: 0.03 K/UL (ref 0–0.12)
BILIRUB SERPL-MCNC: 1.2 MG/DL (ref 0.1–1.5)
BUN SERPL-MCNC: 15 MG/DL (ref 8–22)
CALCIUM ALBUM COR SERPL-MCNC: 9 MG/DL (ref 8.5–10.5)
CALCIUM SERPL-MCNC: 9.5 MG/DL (ref 8.4–10.2)
CHLORIDE SERPL-SCNC: 101 MMOL/L (ref 96–112)
CO2 SERPL-SCNC: 23 MMOL/L (ref 20–33)
CREAT SERPL-MCNC: 1.13 MG/DL (ref 0.5–1.4)
EOSINOPHIL # BLD AUTO: 0.06 K/UL (ref 0–0.51)
EOSINOPHIL NFR BLD: 0.9 % (ref 0–6.9)
ERYTHROCYTE [DISTWIDTH] IN BLOOD BY AUTOMATED COUNT: 38.5 FL (ref 35.9–50)
FLUAV RNA SPEC QL NAA+PROBE: NEGATIVE
FLUBV RNA SPEC QL NAA+PROBE: NEGATIVE
GFR SERPLBLD CREATININE-BSD FMLA CKD-EPI: 87 ML/MIN/1.73 M 2
GLOBULIN SER CALC-MCNC: 3.1 G/DL (ref 1.9–3.5)
GLUCOSE SERPL-MCNC: 104 MG/DL (ref 65–99)
HCT VFR BLD AUTO: 48.2 % (ref 42–52)
HGB BLD-MCNC: 16.5 G/DL (ref 14–18)
IMM GRANULOCYTES # BLD AUTO: 0.05 K/UL (ref 0–0.11)
IMM GRANULOCYTES NFR BLD AUTO: 0.8 % (ref 0–0.9)
LYMPHOCYTES # BLD AUTO: 2.16 K/UL (ref 1–4.8)
LYMPHOCYTES NFR BLD: 34.1 % (ref 22–41)
MCH RBC QN AUTO: 30.4 PG (ref 27–33)
MCHC RBC AUTO-ENTMCNC: 34.2 G/DL (ref 32.3–36.5)
MCV RBC AUTO: 88.9 FL (ref 81.4–97.8)
MONOCYTES # BLD AUTO: 0.42 K/UL (ref 0–0.85)
MONOCYTES NFR BLD AUTO: 6.6 % (ref 0–13.4)
NEUTROPHILS # BLD AUTO: 3.62 K/UL (ref 1.82–7.42)
NEUTROPHILS NFR BLD: 57.1 % (ref 44–72)
NRBC # BLD AUTO: 0 K/UL
NRBC BLD-RTO: 0 /100 WBC (ref 0–0.2)
PLATELET # BLD AUTO: 204 K/UL (ref 164–446)
PMV BLD AUTO: 11.1 FL (ref 9–12.9)
POTASSIUM SERPL-SCNC: 4 MMOL/L (ref 3.6–5.5)
PROT SERPL-MCNC: 7.7 G/DL (ref 6–8.2)
RBC # BLD AUTO: 5.42 M/UL (ref 4.7–6.1)
RSV RNA SPEC QL NAA+PROBE: NEGATIVE
SARS-COV-2 RNA RESP QL NAA+PROBE: NOTDETECTED
SODIUM SERPL-SCNC: 137 MMOL/L (ref 135–145)
SPECIMEN SOURCE: NORMAL
WBC # BLD AUTO: 6.3 K/UL (ref 4.8–10.8)

## 2024-09-24 PROCEDURE — 99283 EMERGENCY DEPT VISIT LOW MDM: CPT

## 2024-09-24 PROCEDURE — 85025 COMPLETE CBC W/AUTO DIFF WBC: CPT

## 2024-09-24 PROCEDURE — 0241U HCHG SARS-COV-2 COVID-19 NFCT DS RESP RNA 4 TRGT MIC: CPT

## 2024-09-24 PROCEDURE — 36415 COLL VENOUS BLD VENIPUNCTURE: CPT

## 2024-09-24 PROCEDURE — 80053 COMPREHEN METABOLIC PANEL: CPT

## 2024-09-24 PROCEDURE — 71045 X-RAY EXAM CHEST 1 VIEW: CPT

## 2024-09-25 NOTE — ED TRIAGE NOTES
Pt ambulate to triage with c/o worsening fatigue for the past few months. He states he's been seen by his PCP but no underlying cause has been found. He also reports an intermittent cough for the past 3 weeks that worsening while walking.

## 2024-09-25 NOTE — ED PROVIDER NOTES
ED Provider Note    CHIEF COMPLAINT  Chief Complaint   Patient presents with    Fatigue       EXTERNAL RECORDS REVIEWED  Outpatient Notes   [facility]    HPI/ROS  LIMITATION TO HISTORY   Select: : None  OUTSIDE HISTORIAN(S):  None    Waldemar Ohara is a 34 y.o. male who presents here for evaluation of general fatigue.  Patient states has been ongoing for over a year, and has been seen by his primary care doctor, and with multiple ER visits and urgent care.  Patient has stated that he is mostly fatigued, when he gets up in the morning, and has just general weakness as well.  He denies any fall or trauma, he has no fevers or chills, no vomiting, no chest pain shortness breath or abdominal pain.    PAST MEDICAL HISTORY   has a past medical history of COVID.    SURGICAL HISTORY  patient denies any surgical history    FAMILY HISTORY  Family History   Problem Relation Age of Onset    No Known Problems Mother     Hypertension Father     Hyperlipidemia Father     Hypertension Brother     Obesity Brother     No Known Problems Maternal Grandmother     No Known Problems Maternal Grandfather     No Known Problems Paternal Grandmother     No Known Problems Paternal Grandfather        SOCIAL HISTORY  Social History     Tobacco Use    Smoking status: Never    Smokeless tobacco: Never   Vaping Use    Vaping status: Never Used   Substance and Sexual Activity    Alcohol use: Not Currently     Comment: rare    Drug use: Never    Sexual activity: Not Currently     Partners: Female     Birth control/protection: None       CURRENT MEDICATIONS  Home Medications    **Home medications have not yet been reviewed for this encounter**       Audit from Redirected Encounters    **Home medications have not yet been reviewed for this encounter**         ALLERGIES  No Known Allergies    PHYSICAL EXAM  VITAL SIGNS: BP (!) 180/95   Pulse 69   Temp 36.8 °C (98.2 °F) (Temporal)   Resp 18   SpO2 97%    Constitutional: Well  developed, well nourished. No acute distress.  HEENT: Normocephalic, atraumatic. Posterior pharynx clear and moist.  Eyes:  EOMI. Normal sclera.  Neck: Supple, Full range of motion, nontender.  Chest/Pulmonary: clear to ausculation. Symmetrical expansion.   Cardio: Regular rate and rhythm with no murmur.   Abdomen: Soft, nontender. No peritoneal signs. No guarding. No palpable masses.  Musculoskeletal: No deformity, no edema, neurovascular intact.   Neuro: Clear speech, appropriate, cooperative, cranial nerves II-XII grossly intact.  Psych: Normal mood and affect      EKG/LABS  Results for orders placed or performed during the hospital encounter of 09/24/24   CBC WITH DIFFERENTIAL   Result Value Ref Range    WBC 6.3 4.8 - 10.8 K/uL    RBC 5.42 4.70 - 6.10 M/uL    Hemoglobin 16.5 14.0 - 18.0 g/dL    Hematocrit 48.2 42.0 - 52.0 %    MCV 88.9 81.4 - 97.8 fL    MCH 30.4 27.0 - 33.0 pg    MCHC 34.2 32.3 - 36.5 g/dL    RDW 38.5 35.9 - 50.0 fL    Platelet Count 204 164 - 446 K/uL    MPV 11.1 9.0 - 12.9 fL    Neutrophils-Polys 57.10 44.00 - 72.00 %    Lymphocytes 34.10 22.00 - 41.00 %    Monocytes 6.60 0.00 - 13.40 %    Eosinophils 0.90 0.00 - 6.90 %    Basophils 0.50 0.00 - 1.80 %    Immature Granulocytes 0.80 0.00 - 0.90 %    Nucleated RBC 0.00 0.00 - 0.20 /100 WBC    Neutrophils (Absolute) 3.62 1.82 - 7.42 K/uL    Lymphs (Absolute) 2.16 1.00 - 4.80 K/uL    Monos (Absolute) 0.42 0.00 - 0.85 K/uL    Eos (Absolute) 0.06 0.00 - 0.51 K/uL    Baso (Absolute) 0.03 0.00 - 0.12 K/uL    Immature Granulocytes (abs) 0.05 0.00 - 0.11 K/uL    NRBC (Absolute) 0.00 K/uL   CoV-2, FLU A/B, and RSV by PCR (2-4 Hours [facility]) : Collect NP swab in VTM    Specimen: Respirate   Result Value Ref Range    SARS-CoV-2 Source NP Swab          RADIOLOGY/PROCEDURES   I have independently interpreted the diagnostic imaging associated with this visit and am waiting the final reading from the radiologist.   My preliminary interpretation is as follows:  below     Radiologist interpretation:  DX-CHEST-PORTABLE (1 VIEW)   Final Result      No acute cardiopulmonary abnormality.             COURSE & MEDICAL DECISION MAKING    ASSESSMENT, COURSE AND PLAN  Care Narrative: This is a 34-year-old male here for evaluation of general fatigue that has been ongoing and chronic.  Patient has no fever or chills, no vomiting, no acute findings noted.        DISPOSITION AND DISCUSSIONS  I have discussed management of the patient with the following physicians and DRE's: None none    Discussion of management with other [facility] or appropriate source(s): None    Escalation of care considered, and ultimately not performed: None none    Barriers to care at this time, including but not limited to: None.     Decision tools and prescription drugs considered including, but not limited to: None.    FINAL DIAGNOSIS  Fatigue     Electronically signed by: Stanton Lunsford D.O., 9/24/2024 6:39 PM

## 2024-09-26 ENCOUNTER — OFFICE VISIT (OUTPATIENT)
Dept: MEDICAL GROUP | Facility: PHYSICIAN GROUP | Age: 34
End: 2024-09-26
Payer: COMMERCIAL

## 2024-09-26 VITALS
TEMPERATURE: 99 F | OXYGEN SATURATION: 97 % | SYSTOLIC BLOOD PRESSURE: 126 MMHG | HEART RATE: 91 BPM | DIASTOLIC BLOOD PRESSURE: 66 MMHG | HEIGHT: 73 IN | WEIGHT: 222 LBS | BODY MASS INDEX: 29.42 KG/M2

## 2024-09-26 DIAGNOSIS — F41.1 GAD (GENERALIZED ANXIETY DISORDER): ICD-10-CM

## 2024-09-26 DIAGNOSIS — Z02.89 ENCOUNTER FOR COMPLETION OF FORM WITH PATIENT: ICD-10-CM

## 2024-09-26 DIAGNOSIS — R53.83 OTHER FATIGUE: ICD-10-CM

## 2024-09-26 DIAGNOSIS — R20.2 NUMBNESS AND TINGLING OF BOTH LOWER EXTREMITIES: ICD-10-CM

## 2024-09-26 DIAGNOSIS — R73.09 ELEVATED GLUCOSE: ICD-10-CM

## 2024-09-26 DIAGNOSIS — R20.0 NUMBNESS AND TINGLING OF BOTH LOWER EXTREMITIES: ICD-10-CM

## 2024-09-26 DIAGNOSIS — R73.03 PREDIABETES: ICD-10-CM

## 2024-09-26 DIAGNOSIS — R63.0 DECREASED APPETITE: ICD-10-CM

## 2024-09-26 DIAGNOSIS — F32.1 CURRENT MODERATE EPISODE OF MAJOR DEPRESSIVE DISORDER WITHOUT PRIOR EPISODE (HCC): ICD-10-CM

## 2024-09-26 DIAGNOSIS — R06.83 SNORING: ICD-10-CM

## 2024-09-26 LAB
HBA1C MFR BLD: 5.8 % (ref ?–5.8)
POCT INT CON NEG: NEGATIVE
POCT INT CON POS: POSITIVE

## 2024-09-26 PROCEDURE — 99215 OFFICE O/P EST HI 40 MIN: CPT | Mod: 25 | Performed by: NURSE PRACTITIONER

## 2024-09-26 PROCEDURE — 83036 HEMOGLOBIN GLYCOSYLATED A1C: CPT | Performed by: NURSE PRACTITIONER

## 2024-09-26 PROCEDURE — 3074F SYST BP LT 130 MM HG: CPT | Performed by: NURSE PRACTITIONER

## 2024-09-26 PROCEDURE — 99417 PROLNG OP E/M EACH 15 MIN: CPT | Performed by: NURSE PRACTITIONER

## 2024-09-26 PROCEDURE — 3078F DIAST BP <80 MM HG: CPT | Performed by: NURSE PRACTITIONER

## 2024-09-26 RX ORDER — CHOLECALCIFEROL (VITAMIN D3) 25 MCG
CAPSULE ORAL DAILY
COMMUNITY

## 2024-09-26 ASSESSMENT — ANXIETY QUESTIONNAIRES
1. FEELING NERVOUS, ANXIOUS, OR ON EDGE: SEVERAL DAYS
GAD7 TOTAL SCORE: 5
3. WORRYING TOO MUCH ABOUT DIFFERENT THINGS: SEVERAL DAYS
2. NOT BEING ABLE TO STOP OR CONTROL WORRYING: SEVERAL DAYS
5. BEING SO RESTLESS THAT IT IS HARD TO SIT STILL: NOT AT ALL
4. TROUBLE RELAXING: SEVERAL DAYS
7. FEELING AFRAID AS IF SOMETHING AWFUL MIGHT HAPPEN: SEVERAL DAYS
6. BECOMING EASILY ANNOYED OR IRRITABLE: NOT AT ALL

## 2024-09-26 ASSESSMENT — PATIENT HEALTH QUESTIONNAIRE - PHQ9
5. POOR APPETITE OR OVEREATING: 3 - NEARLY EVERY DAY
CLINICAL INTERPRETATION OF PHQ2 SCORE: 2
SUM OF ALL RESPONSES TO PHQ QUESTIONS 1-9: 10

## 2024-09-26 ASSESSMENT — FIBROSIS 4 INDEX: FIB4 SCORE: .673575314054563392

## 2024-09-26 NOTE — PATIENT INSTRUCTIONS
[facility] [address]  [facility] [address]  [phone]

## 2024-09-26 NOTE — PROGRESS NOTES
Subjective:     CC: fatigue, weakness, loss of appetite, feet numbness, paperwork    HPI:   Maldivian presents today with the following:    Prediabetes  Glucose at his recent ER visits have been 103 and 104.  A1c today 5.8%.    Numbness and tingling of both lower extremities  He has tingling/numbness in feet intermittently that started 3-4 days ago. Reports today numbness is constant. Severity wax's and wanes. When standing feels like numbness/tingling goes up the foot. He was sitting on the couch when it started. No bowel or bladder incontinence, no saddle anesthesia.  Monofilament normal today.  A1c 5.8%.    Decreased appetite  He states that his appetite is gone. The last 4-5 days he has been forcing himself to eat. He states at home his weight is down 10 pounds. He is eating 1-2 times/day and previous 3x/day. No bloody stools but stool is orange for the last week. No bloating. No belching. Acid reflux with prednisone. No abdominal pain. No dysuria or hematuria. Brother told him ovarian cancer in maternal grandmother, paternal grandmother leukemia.  Had recent CT abdomen in ER that showed tiny left inguinal hernia containing fat only otherwise no obstruction, Diverticulitis or appendicitis seen.  He was recently in the ER and referrals have been placed to ophthalmology, neurology, and gastroenterology.  Check H. pylori.    Other fatigue  Fatigue has been present for the last 2 months and he also has decreased appetite.  Recent labs have not shown any anemia, thyroid dysfunction, electrolyte abnormality, kidney function abnormality, liver function abnormality, vitamin D deficiency, thyroid dysfunction.  He does have referral to gastroenterology.  He has also noticed snoring.  STOP-BANG score today is 3.  Will check overnight sleep study as well.    Encounter for completion of form with patient  He works at [employer]. He would like to have a month off from work. He has had referrals from ER to ophthalmology,  "gastroenterology and neurology. He will be off work 9/15/2024-10/27/2024. He works in production, builds car battery.       Past Medical History:   Diagnosis Date    COVID        Social History     Tobacco Use    Smoking status: Never    Smokeless tobacco: Never   Vaping Use    Vaping status: Never Used   Substance Use Topics    Alcohol use: Not Currently     Comment: rare    Drug use: Never       Current Outpatient Medications Ordered in Epic   Medication Sig Dispense Refill    Cholecalciferol (VITAMIN D-3) 25 MCG (1000 UT) Cap Take  by mouth every day. (Patient not taking: Reported on 9/26/2024)      Multiple Vitamins-Minerals (COMPLETE MULTIVITAMIN/MINERAL PO) Take 1 Tablet by mouth every day. (Patient not taking: Reported on 9/26/2024)       No current [facility]-ordered facility-administered medications on file.       Allergies:  Patient has no known allergies.    Health Maintenance: Completed       Objective:     Vital signs reviewed  Exam:  /66 (BP Location: Right arm, Patient Position: Sitting, BP Cuff Size: Adult)   Pulse 91   Temp 37.2 °C (99 °F) (Temporal)   Ht 1.854 m (6' 1\")   Wt 101 kg (222 lb)   SpO2 97%   BMI 29.29 kg/m²  Body mass index is 29.29 kg/m².      General: Normal appearing. No distress.  HENT: Normocephalic. Ears normal shape and contour, canals are clear bilaterally, tympanic membranes are benign, nasal mucosa benign, oropharynx is without erythema, edema or exudates. No pain to frontal or maxillary sinuses.   Eyes: Eyes conjunctiva clear lids without ptosis, lids normal.  Pulmonary: Clear to ausculation.  Normal effort. No rales, ronchi, or wheezing.  Cardiovascular: Regular rate and rhythm without murmur.   Abdomen: Soft, nontender, nondistended. Normal bowel sounds. Liver and spleen are not palpable. No CVA tenderness  Psych: Normal mood and affect. Alert and oriented x3. Judgment and insight is normal.      Monofilament testing with a 10 gram force: sensation intact from plantar " surface extended up to bilateral knees, intact bilaterally to sharp and dull sensation  Visual Inspection: Feet without maceration, ulcers, fissures.  Pedal pulses: intact bilaterally, 2+, capillary refill < 3 seconds      Labs:      Latest Reference Range & Units 09/26/24 15:58   Glycohemoglobin 5.8 % 5.8       Assessment & Plan:     34 y.o. male with the following -     1. Decreased appetite  Acute complicated problem.  Continue with GI referral.  We discussed checking H. pylori.  Discussed checking for STD screening he declines today, states that he will think about.  Recommend screening for HIV, updated see, syphilis, gonorrhea and chlamydia.  Reassurance that his CT scan had no worrisome findings at this time.  Reassurance that his labs have been in normal range.  He expresses concern several times for leukemia diagnosis and reassurance provided that his CBCs have been normal.  - HELICOBACTER PYLORI BREATH TEST; Future    2. Other fatigue  Acute complicated problem.  Continue follow-up with neurology.  Checking sleep study.    3. Numbness and tingling of both lower extremities  Acute complicated problem.  Reassurance to patient today that his exam was not abnormal.  There are no obvious wounds or deformities seen in his feet.    4. Prediabetes  Acute uncomplicated problem.  New problem to examiner.  Recently had elevated glucose with your labs and A1c today 5.8%.  Recommend watching sugar and carbohydrate intake.    5. Elevated glucose  Acute uncomplicated problem.  See #4 above.  - POCT  A1C    6. Snoring  Acute uncomplicated problem.  With his fatigue he has been having snoring.  We discussed checking sleep study to rule out sleep apnea and he is in agreement.  - Overnight Home Sleep Study; Future    7. LUC (generalized anxiety disorder)  Acute uncomplicated problem.  LUC score today 5.  He will reach out through work for therapy.  We also discussed that he has referral to behavioral health in place and  contact information provided.  We discussed medication and he declines at this time.  Discussed that he should follow-up with behavioral health.    8. Current moderate episode of major depressive disorder without prior episode (HCC)  Acute uncomplicated problem.  PHQ score today 10.  He denies self-harm or SI today.  We discussed medication and he declines at this time.  Discussed follow-up with behavioral health.  Contact information provided.    9. Encounter for completion of form with patient  Acute uncomplicated problem.  FMLA paperwork completed for patient today.  Copy scanned into chart.    My total time spent caring for the patient on the day of the encounter was 88 minutes.   This does not include time spent on separately billable procedures/tests.        Return in about 3 weeks (around 10/17/2024) for Multiple Issues.    Please note that this dictation was created using voice recognition software. I have made every reasonable attempt to correct obvious errors, but I expect that there are errors of grammar and possibly content that I did not discover before finalizing the note.

## 2024-09-27 ENCOUNTER — APPOINTMENT (OUTPATIENT)
Dept: RADIOLOGY | Facility: MEDICAL CENTER | Age: 34
End: 2024-09-27
Attending: EMERGENCY MEDICINE
Payer: COMMERCIAL

## 2024-09-27 ENCOUNTER — HOSPITAL ENCOUNTER (EMERGENCY)
Facility: MEDICAL CENTER | Age: 34
End: 2024-09-28
Attending: EMERGENCY MEDICINE
Payer: COMMERCIAL

## 2024-09-27 VITALS
DIASTOLIC BLOOD PRESSURE: 79 MMHG | BODY MASS INDEX: 29.73 KG/M2 | WEIGHT: 224.3 LBS | OXYGEN SATURATION: 97 % | TEMPERATURE: 98.3 F | HEIGHT: 73 IN | RESPIRATION RATE: 18 BRPM | HEART RATE: 64 BPM | SYSTOLIC BLOOD PRESSURE: 146 MMHG

## 2024-09-27 DIAGNOSIS — R53.83 OTHER FATIGUE: ICD-10-CM

## 2024-09-27 DIAGNOSIS — H53.10 EYE FATIGUE, BILATERAL: ICD-10-CM

## 2024-09-27 DIAGNOSIS — M62.81 MUSCLE WEAKNESS OF ALL 4 EXTREMITIES: ICD-10-CM

## 2024-09-27 DIAGNOSIS — R05.3 CHRONIC COUGH: ICD-10-CM

## 2024-09-27 DIAGNOSIS — R91.1 PULMONARY NODULE: ICD-10-CM

## 2024-09-27 LAB
ALBUMIN SERPL BCP-MCNC: 4.2 G/DL (ref 3.2–4.9)
ALBUMIN/GLOB SERPL: 1.6 G/DL
ALP SERPL-CCNC: 71 U/L (ref 30–99)
ALT SERPL-CCNC: <5 U/L (ref 2–50)
ANION GAP SERPL CALC-SCNC: 10 MMOL/L (ref 7–16)
AST SERPL-CCNC: 13 U/L (ref 12–45)
BASOPHILS # BLD AUTO: 0.3 % (ref 0–1.8)
BASOPHILS # BLD: 0.02 K/UL (ref 0–0.12)
BILIRUB SERPL-MCNC: 0.9 MG/DL (ref 0.1–1.5)
BUN SERPL-MCNC: 11 MG/DL (ref 8–22)
CALCIUM ALBUM COR SERPL-MCNC: 8.7 MG/DL (ref 8.5–10.5)
CALCIUM SERPL-MCNC: 8.9 MG/DL (ref 8.4–10.2)
CHLORIDE SERPL-SCNC: 104 MMOL/L (ref 96–112)
CO2 SERPL-SCNC: 24 MMOL/L (ref 20–33)
CREAT SERPL-MCNC: 1.08 MG/DL (ref 0.5–1.4)
EKG IMPRESSION: NORMAL
EOSINOPHIL # BLD AUTO: 0.05 K/UL (ref 0–0.51)
EOSINOPHIL NFR BLD: 0.8 % (ref 0–6.9)
ERYTHROCYTE [DISTWIDTH] IN BLOOD BY AUTOMATED COUNT: 38.5 FL (ref 35.9–50)
GFR SERPLBLD CREATININE-BSD FMLA CKD-EPI: 92 ML/MIN/1.73 M 2
GLOBULIN SER CALC-MCNC: 2.6 G/DL (ref 1.9–3.5)
GLUCOSE SERPL-MCNC: 101 MG/DL (ref 65–99)
HCT VFR BLD AUTO: 43.5 % (ref 42–52)
HGB BLD-MCNC: 15 G/DL (ref 14–18)
IMM GRANULOCYTES # BLD AUTO: 0.06 K/UL (ref 0–0.11)
IMM GRANULOCYTES NFR BLD AUTO: 0.9 % (ref 0–0.9)
LYMPHOCYTES # BLD AUTO: 2.21 K/UL (ref 1–4.8)
LYMPHOCYTES NFR BLD: 33.8 % (ref 22–41)
MCH RBC QN AUTO: 30.5 PG (ref 27–33)
MCHC RBC AUTO-ENTMCNC: 34.5 G/DL (ref 32.3–36.5)
MCV RBC AUTO: 88.4 FL (ref 81.4–97.8)
MONOCYTES # BLD AUTO: 0.51 K/UL (ref 0–0.85)
MONOCYTES NFR BLD AUTO: 7.8 % (ref 0–13.4)
NEUTROPHILS # BLD AUTO: 3.69 K/UL (ref 1.82–7.42)
NEUTROPHILS NFR BLD: 56.4 % (ref 44–72)
NRBC # BLD AUTO: 0 K/UL
NRBC BLD-RTO: 0 /100 WBC (ref 0–0.2)
PLATELET # BLD AUTO: 181 K/UL (ref 164–446)
PMV BLD AUTO: 11.4 FL (ref 9–12.9)
POTASSIUM SERPL-SCNC: 4.1 MMOL/L (ref 3.6–5.5)
PROT SERPL-MCNC: 6.8 G/DL (ref 6–8.2)
RBC # BLD AUTO: 4.92 M/UL (ref 4.7–6.1)
SODIUM SERPL-SCNC: 138 MMOL/L (ref 135–145)
WBC # BLD AUTO: 6.5 K/UL (ref 4.8–10.8)

## 2024-09-27 PROCEDURE — 99283 EMERGENCY DEPT VISIT LOW MDM: CPT

## 2024-09-27 PROCEDURE — 36415 COLL VENOUS BLD VENIPUNCTURE: CPT

## 2024-09-27 PROCEDURE — 80053 COMPREHEN METABOLIC PANEL: CPT

## 2024-09-27 PROCEDURE — 93005 ELECTROCARDIOGRAM TRACING: CPT | Performed by: EMERGENCY MEDICINE

## 2024-09-27 PROCEDURE — 85025 COMPLETE CBC W/AUTO DIFF WBC: CPT

## 2024-09-27 ASSESSMENT — FIBROSIS 4 INDEX: FIB4 SCORE: .673575314054563392

## 2024-09-27 NOTE — ASSESSMENT & PLAN NOTE
He has tingling/numbness in feet intermittently that started 3-4 days ago. Reports today numbness is constant. Severity wax's and wanes. When standing feels like numbness/tingling goes up the foot. He was sitting on the couch when it started. No bowel or bladder incontinence, no saddle anesthesia.  Monofilament normal today.  A1c 5.8%.

## 2024-09-27 NOTE — ASSESSMENT & PLAN NOTE
Fatigue has been present for the last 2 months and he also has decreased appetite.  Recent labs have not shown any anemia, thyroid dysfunction, electrolyte abnormality, kidney function abnormality, liver function abnormality, vitamin D deficiency, thyroid dysfunction.  He does have referral to gastroenterology.  He has also noticed snoring.  STOP-BANG score today is 3.  Will check overnight sleep study as well.

## 2024-09-27 NOTE — ASSESSMENT & PLAN NOTE
He works at [email or web address]. He would like to have a month off from work. He has had referrals from ER to ophthalmology, gastroenterology and neurology. He will be off work 9/15/2024-10/27/2024. He works in production, builds car battery.

## 2024-09-27 NOTE — ASSESSMENT & PLAN NOTE
He states that his appetite is gone. The last 4-5 days he has been forcing himself to eat. He states at home his weight is down 10 pounds. He is eating 1-2 times/day and previous 3x/day. No bloody stools but stool is orange for the last week. No bloating. No belching. Acid reflux with prednisone. No abdominal pain. No dysuria or hematuria. Brother told him ovarian cancer in maternal grandmother, paternal grandmother leukemia.  Had recent CT abdomen in ER that showed tiny left inguinal hernia containing fat only otherwise no obstruction, Diverticulitis or appendicitis seen.  He was recently in the ER and referrals have been placed to ophthalmology, neurology, and gastroenterology.  Check H. pylori.

## 2024-09-28 PROCEDURE — 71260 CT THORAX DX C+: CPT

## 2024-09-28 PROCEDURE — 700117 HCHG RX CONTRAST REV CODE 255: Performed by: EMERGENCY MEDICINE

## 2024-09-28 RX ADMIN — IOHEXOL 80 ML: 350 INJECTION, SOLUTION INTRAVENOUS at 00:07

## 2024-09-28 NOTE — ED PROVIDER NOTES
ED Provider Note    CHIEF COMPLAINT  Chief Complaint   Patient presents with    Weakness     Report it started approx 2moths ago he has been feeling increased weakness for starting in August symptoms ranging from soreness in upper arms, blurry vision pt concerned there could be something serious like cancer. Pt was seen recently for fatigue has seen pcp pending a GI referral in january   Denies CP/-N/-V/-SOB    Fatigue       EXTERNAL RECORDS REVIEWED  Other ED records reviewed: Patient has been seen in the emergency department multiple times over the past couple months with the same complaints (8/26, 9/20, 9/21, 9/24), as well as at his primary care provider's office.  Extensive workup has not revealed any explanation for his symptoms, including a negative MRI brain and negative CT abdomen/pelvis.  He is also had reassuring blood work on multiple occasions including normal complete blood count, metabolic panel, and negative viral swabs (COVID-19, influenza, RSV), negative Monospot testing, reassuring EKGs.     HPI/ROS  LIMITATION TO HISTORY   Select: : None  OUTSIDE HISTORIAN(S):  None    Waldemar Ohara is a 34 y.o. male with no reported past medical history who presents to the emergency department for evaluation of progressing weakness over the past 2 months.  Patient been seen in the emergency department multiple times with the same complaint (8/26, 9/20, 9/21, 9/24).  He has also been seen by his primary care provider multiple times.  He has had an extensive reassuring workup including a negative MRI brain, negative CT abdomen/pelvis, reassuring lab studies on multiple occasions including normal CBC, CMP, troponin, viral swabs, Monospot testing, EKGs.    Patient reports 2 months ago he started to notice weakness in his bilateral lower and upper extremities with minimal exertion.  This has progressed over the past 2 months.  Two months ago he was going to the gym 5-6 times per week and lifting heavy  weights without any issue, however now he can barely hold his 20lbs baby for 10-15 minutes without feeling extreme muscle fatigue.  He also has trouble walking even short distances without feeling fatigued.  He notes some throat irritation and a dry cough that has been present intermittently for the past 2 months.  He has no prior history of tobacco use or smoking history.  He also reports some unintentional weight loss, approximately 10lbs, as well as eyes fatigue when reading.  Denies loss of vision, double vision, eye pain.  Denies headache.  No chest pain, palpitations, shortness of breath.  No nausea, vomiting, diarrhea, constipation, abdominal pain, urinary symptoms, flank pain.    PAST MEDICAL HISTORY   has a past medical history of COVID.    SURGICAL HISTORY  patient denies any surgical history    FAMILY HISTORY  Family History   Problem Relation Age of Onset    No Known Problems Mother     Hypertension Father     Hyperlipidemia Father     Hypertension Brother     Obesity Brother     No Known Problems Maternal Grandmother     No Known Problems Maternal Grandfather     No Known Problems Paternal Grandmother     No Known Problems Paternal Grandfather        SOCIAL HISTORY  Social History     Tobacco Use    Smoking status: Never    Smokeless tobacco: Never   Vaping Use    Vaping status: Never Used   Substance and Sexual Activity    Alcohol use: Not Currently     Comment: rare    Drug use: Never    Sexual activity: Not Currently     Partners: Female     Birth control/protection: None       CURRENT MEDICATIONS  Home Medications       Reviewed by Gianna Sierra R.N. (Registered Nurse) on 09/27/24 at 1922  Med List Status: Not Addressed     Medication Last Dose Status   Cholecalciferol (VITAMIN D-3) 25 MCG (1000 UT) Cap  Active   Multiple Vitamins-Minerals (COMPLETE MULTIVITAMIN/MINERAL PO)  Active                    ALLERGIES  No Known Allergies    PHYSICAL EXAM  VITAL SIGNS: BP (!) 146/79   Pulse 64    "Temp 36.8 °C (98.3 °F) (Temporal)   Resp 18   Ht 1.859 m (6' 1.2\")   Wt 102 kg (224 lb 4.8 oz)   SpO2 97%   BMI 29.43 kg/m²    General: Well-appearing, no acute distress.   HENT: Oropharynx clear, uvula midline, symmetric tonsils without exudates.   Neck: Normal ROM. No cervical lymphadenopathy or swelling. No midline cervical spine tenderness. No palpable thyroid masses.   Lungs: Non-labored breathing. Clear to auscultation bilaterally. No wheezing or crackles.  Cardiac: Regular rate and rhythm. No murmurs. No lower extremity swelling. Well-perfused.  Abdomen: Soft, non-tender, non-distended. No rebound or guarding.   MSK: Symmetric movement of all extremities.  Skin: No rashes, lesions, bruising, or petechiae. Well-perfused.   NEURO:   Mental Status: Alert and oriented x 3, normal mentation, normal fund of knowledge.  Speech: No aphasia, no dysarthria.  Cranial Nerves: Pupils equal and reactive, no nystagmus, no visual field deficits, no ptosis, normal facial sensation bilaterally, normal facial symmetry, no nasolabial fold flattening, normal hearing, uvula midline and normal palate elevation, midline tongue protrusion, 5/5 shoulder shrug and head turn.  Motor: 5/5 strength in the right and left shoulders, elbow flexors/extensors, wrist flexors/extensors, finger abductors/adductors. 5/5 strength in the right and left hip flexors/extensors, knee flexors/extensors, ankle dorsiflexors and plantar flexors.  Sensation: Sensation intact to light touch.   Cerebellar: Normal finger-to-nose and heel-to-shin testing.  Station: Normal stance, no truncal ataxia.  Gait: Normal gait.     EKG/LABS  EKG INTERPRETATION:   Time: 22:04    Rate: 60   Rhythm: Sinus rhythm    Axis and Intervals: Normal axis and intervals    No ST segment or T wave abnormalities  No evidence of Brugada, WPW, or prolonged QT  I have independently interpreted this EKG    RADIOLOGY/PROCEDURES   I have independently interpreted the diagnostic imaging " associated with this visit and am waiting the final reading from the radiologist.   My preliminary interpretation is as follows:   CT chest: No acute thoracic abnormality.    Radiologist interpretation:  CT-CHEST (THORAX) WITH   Final Result         1.  Nodular abnormality identified   2.  Pulmonary nodule, see nodule follow-up recommendations below.      Fleischner Society pulmonary nodule recommendations:   Low and High Risk: Consider CT at 3 months, PET/CT, or tissue sampling.      Low Risk - Minimal or absent history of smoking and of other known risk factors.      High Risk - History of smoking or of other known risk factors.      Note: These recommendations do not apply to lung cancer screening, patients with immunosuppression, or patients with known primary cancer.      Fleischner Society 2017 Guidelines for Management of Incidentally Detected Pulmonary Nodules in Adults        COURSE & MEDICAL DECISION MAKING    ASSESSMENT, COURSE AND PLAN  Care Narrative:   Waldemar Ohara is a 34 y.o. male with no reported past medical history who presents to the emergency department for evaluation of progressing weakness over the past 2 months.  Patient been seen in the emergency department multiple times with the same complaint (8/26, 9/20, 9/21, 9/24).  He has also been seen by his primary care provider multiple times.  He has had an extensive reassuring workup including a negative MRI brain, negative CT abdomen/pelvis, reassuring lab studies on multiple occasions including normal CBC, CMP, troponin, viral swabs, Monospot testing, EKGs.  On my initial assessment, ABCs are intact.  Vital signs are within normal limits and he is afebrile.  He is well-appearing and nontoxic.  Pupils are equal and reactive, extraocular eye movements are intact, no ptosis.  His neurologic exam is nonfocal, reflexes are normal, normal strength.  He is breathing comfortably with clear lung sounds.  He has no palpable thyroid masses or  goiter, oropharynx is clear.  Differential includes unspecified autoimmune process, myasthenia gravis, paraneoplastic process, electrolyte derangement, cardiac dysrhythmia.     Based on his symptoms, I am concerned he may have an autoimmune process leading to progressive muscle weakness, specifically myasthenia gravis.  He reports muscle weakness in his bilateral lower and upper extremities that has been ascending over the past 2 months (initially started in his calves and forearms, now present in his shoulders).  Two months ago he was going to the gym 5-6 times per week and lifting heavy weights, but now he can barely hold his 20lbs baby for more than 10-15 minutes without feeling extreme fatigue in his arms.  His exam is overall very reassuring.  He has a nonfocal neurologic exam, normal strength, normal reflexes, no ptosis.  He is breathing comfortably.  He has no reported history of difficulty breathing or managing his secretions.  He does endorse some eye fatigue while reading, but otherwise has no other eye complaints.  He endorses a dry cough for last 2 months.  Plan to obtain CT imaging of the chest to rule out any intrathoracic masses that could be contributing to a paraneoplastic process.    IV access was established and lab studies were obtained.  CBC without abnormalities, no leukocytosis, normal hemoglobin, normal platelets.  CMP showed normal renal function, nonactionable electrolytes, normal liver enzymes without an obstructive pattern.  EKG without evidence of ischemia, no Brugada, WPW, prolonged QT.    00:33: CT chest showed a right lower lung pulmonary nodule, 10mm.  Otherwise no abnormalities.    00:45: I reviewed the imaging findings with the patient.  I explained that we incidentally found a right lower lobe pulmonary nodule, which is likely benign, and he is low risk since he has no prior history of smoking or tobacco use.  I explained this nodule will now need to be followed up in 3-6 months  with repeat imaging to ensure resolution.  I do think his symptoms warrant a workup for an autoimmune process causing his progressive muscle weakness, however given his reassuring exam, I think this can be done on an outpatient basis.  I will place a stat neurology referral.  I recommended close follow-up with his primary care provider within the next 1 to 2 weeks.  I reviewed strict return precautions including worsening weakness, difficulty breathing, inability to manage secretions.  He expressed understanding, all of his questions were answered, and he was discharged in stable condition.    ADDITIONAL PROBLEMS MANAGED  N/A    DISPOSITION AND DISCUSSIONS  I have discussed management of the patient with the following physicians and DRE's:  None    Discussion of management with other Q or appropriate source(s): None     Escalation of care considered, and ultimately not performed:acute inpatient care management, however at this time, the patient is most appropriate for outpatient management    Barriers to care at this time, including but not limited to:  None .     Decision tools and prescription drugs considered including, but not limited to:  N/A .    FINAL DIAGNOSIS  1. Other fatigue Acute   2. Pulmonary nodule Acute   3. Muscle weakness of all 4 extremities Acute   4. Eye fatigue, bilateral Acute   5. Chronic cough Acute        Electronically signed by: Rebekah Vasquez D.O., 9/27/2024 10:59 PM

## 2024-09-28 NOTE — ED NOTES
Vital signs stable and no acute distress.  Discharge paperwork signed. Pt understands followup and discharge instructions. Ambulated to exit without assist

## 2024-09-28 NOTE — ED TRIAGE NOTES
Chief Complaint   Patient presents with    Weakness     Report it started approx 2moths ago he has been feeling increased weakness for starting in August symptoms ranging from soreness in upper arms, blurry vision pt concerned there could be something serious like cancer. Pt was seen recently for fatigue has seen pcp pending a GI referral in january   Denies CP/-N/-V/-SOB

## 2024-09-28 NOTE — DISCHARGE INSTRUCTIONS
Today you were evaluated in the emergency department with progressive muscle weakness of your lower and upper extremities for the past couple months.  The CT of your chest today was overall reassuring, however incidentally we discovered that you have a right sided lower lobe pulmonary nodule, measuring 10 cm.  You are low risk for lung cancer given that you have no history of smoking, however your primary care provider needs to order a repeat CT scan in 3 to 6 months to ensure resolution of the nodule.    I do not think you have cancer.  You have had an extensive workup in the emergency department including a CT chest, as well as a CT of your abdomen and pelvis, all of which were reassuring.  Your blood work has also been reassuring on multiple occasions.    I do think you need to be worked up for an autoimmune process that could be causing the progressive muscle weakness, specifically a disease called myasthenia gravis.  You should follow up with your PCP so this workup can be started.  I will also place a stat referral for neurology.     If you have any worsening symptoms including worsening weakness, difficulty breathing, difficulty swallowing or managing your secretions, difficulty seeing or visual changes, eyelid drooping, please return to the emergency department to be reevaluated.

## 2024-09-30 ENCOUNTER — HOSPITAL ENCOUNTER (EMERGENCY)
Facility: MEDICAL CENTER | Age: 34
End: 2024-09-30
Attending: EMERGENCY MEDICINE
Payer: COMMERCIAL

## 2024-09-30 ENCOUNTER — APPOINTMENT (OUTPATIENT)
Dept: MEDICAL GROUP | Facility: PHYSICIAN GROUP | Age: 34
End: 2024-09-30
Payer: COMMERCIAL

## 2024-09-30 VITALS
HEIGHT: 73 IN | OXYGEN SATURATION: 91 % | TEMPERATURE: 98.6 F | RESPIRATION RATE: 16 BRPM | BODY MASS INDEX: 29.31 KG/M2 | DIASTOLIC BLOOD PRESSURE: 69 MMHG | SYSTOLIC BLOOD PRESSURE: 142 MMHG | WEIGHT: 221.12 LBS | HEART RATE: 64 BPM

## 2024-09-30 DIAGNOSIS — R53.83 FATIGUE, UNSPECIFIED TYPE: ICD-10-CM

## 2024-09-30 PROCEDURE — 99282 EMERGENCY DEPT VISIT SF MDM: CPT

## 2024-09-30 ASSESSMENT — FIBROSIS 4 INDEX: FIB4 SCORE: 1.15

## 2024-10-01 ENCOUNTER — HOSPITAL ENCOUNTER (OUTPATIENT)
Dept: LAB | Facility: MEDICAL CENTER | Age: 34
End: 2024-10-01
Attending: NURSE PRACTITIONER
Payer: COMMERCIAL

## 2024-10-01 ENCOUNTER — APPOINTMENT (OUTPATIENT)
Dept: MEDICAL GROUP | Facility: PHYSICIAN GROUP | Age: 34
End: 2024-10-01
Payer: COMMERCIAL

## 2024-10-01 VITALS
SYSTOLIC BLOOD PRESSURE: 110 MMHG | BODY MASS INDEX: 29.16 KG/M2 | HEIGHT: 73 IN | TEMPERATURE: 97.9 F | WEIGHT: 220 LBS | HEART RATE: 66 BPM | OXYGEN SATURATION: 99 % | DIASTOLIC BLOOD PRESSURE: 76 MMHG

## 2024-10-01 DIAGNOSIS — R79.89 ELEVATED VITAMIN B12 LEVEL: ICD-10-CM

## 2024-10-01 DIAGNOSIS — R53.83 OTHER FATIGUE: ICD-10-CM

## 2024-10-01 DIAGNOSIS — R91.1 RIGHT LOWER LOBE PULMONARY NODULE: ICD-10-CM

## 2024-10-01 DIAGNOSIS — R63.0 DECREASED APPETITE: ICD-10-CM

## 2024-10-01 DIAGNOSIS — M62.81 MUSCLE WEAKNESS OF ALL 4 EXTREMITIES: ICD-10-CM

## 2024-10-01 DIAGNOSIS — Z09 HOSPITAL DISCHARGE FOLLOW-UP: ICD-10-CM

## 2024-10-01 DIAGNOSIS — R19.5 ORANGE STOOL: ICD-10-CM

## 2024-10-01 PROCEDURE — 86042 ACETYLCHOLN RCPTR BLCKG ANTB: CPT

## 2024-10-01 PROCEDURE — 82607 VITAMIN B-12: CPT

## 2024-10-01 PROCEDURE — 83540 ASSAY OF IRON: CPT

## 2024-10-01 PROCEDURE — 84425 ASSAY OF VITAMIN B-1: CPT

## 2024-10-01 PROCEDURE — 83550 IRON BINDING TEST: CPT

## 2024-10-01 PROCEDURE — 86041 ACETYLCHOLN RCPTR BNDNG ANTB: CPT

## 2024-10-01 PROCEDURE — 82550 ASSAY OF CK (CPK): CPT

## 2024-10-01 PROCEDURE — 36415 COLL VENOUS BLD VENIPUNCTURE: CPT

## 2024-10-01 PROCEDURE — 86258 DGP ANTIBODY EACH IG CLASS: CPT

## 2024-10-01 PROCEDURE — 82728 ASSAY OF FERRITIN: CPT

## 2024-10-01 PROCEDURE — 82746 ASSAY OF FOLIC ACID SERUM: CPT

## 2024-10-01 PROCEDURE — 86364 TISS TRNSGLTMNASE EA IG CLAS: CPT | Mod: 91

## 2024-10-01 PROCEDURE — 3074F SYST BP LT 130 MM HG: CPT | Performed by: NURSE PRACTITIONER

## 2024-10-01 PROCEDURE — 3078F DIAST BP <80 MM HG: CPT | Performed by: NURSE PRACTITIONER

## 2024-10-01 PROCEDURE — 99214 OFFICE O/P EST MOD 30 MIN: CPT | Performed by: NURSE PRACTITIONER

## 2024-10-01 ASSESSMENT — FIBROSIS 4 INDEX: FIB4 SCORE: 1.15

## 2024-10-02 ENCOUNTER — OFFICE VISIT (OUTPATIENT)
Dept: NEUROLOGY | Facility: MEDICAL CENTER | Age: 34
End: 2024-10-02
Attending: SPECIALIST
Payer: COMMERCIAL

## 2024-10-02 VITALS
BODY MASS INDEX: 29.28 KG/M2 | DIASTOLIC BLOOD PRESSURE: 60 MMHG | WEIGHT: 220.9 LBS | TEMPERATURE: 98.1 F | HEART RATE: 86 BPM | OXYGEN SATURATION: 97 % | HEIGHT: 73 IN | SYSTOLIC BLOOD PRESSURE: 126 MMHG

## 2024-10-02 DIAGNOSIS — R53.1 WEAKNESS: ICD-10-CM

## 2024-10-02 DIAGNOSIS — R53.83 OTHER FATIGUE: ICD-10-CM

## 2024-10-02 LAB
CK SERPL-CCNC: 51 U/L (ref 0–154)
FERRITIN SERPL-MCNC: 172 NG/ML (ref 22–322)
FOLATE SERPL-MCNC: 17.4 NG/ML
IRON SATN MFR SERPL: 60 % (ref 15–55)
IRON SERPL-MCNC: 145 UG/DL (ref 50–180)
TIBC SERPL-MCNC: 243 UG/DL (ref 250–450)
UIBC SERPL-MCNC: 98 UG/DL (ref 110–370)
VIT B12 SERPL-MCNC: 830 PG/ML (ref 211–911)

## 2024-10-02 PROCEDURE — 99204 OFFICE O/P NEW MOD 45 MIN: CPT | Performed by: SPECIALIST

## 2024-10-02 PROCEDURE — 99211 OFF/OP EST MAY X REQ PHY/QHP: CPT | Performed by: SPECIALIST

## 2024-10-02 PROCEDURE — 3078F DIAST BP <80 MM HG: CPT | Performed by: SPECIALIST

## 2024-10-02 PROCEDURE — 3074F SYST BP LT 130 MM HG: CPT | Performed by: SPECIALIST

## 2024-10-02 ASSESSMENT — FIBROSIS 4 INDEX: FIB4 SCORE: 1.15

## 2024-10-02 ASSESSMENT — PATIENT HEALTH QUESTIONNAIRE - PHQ9: CLINICAL INTERPRETATION OF PHQ2 SCORE: 0

## 2024-10-03 ENCOUNTER — HOSPITAL ENCOUNTER (EMERGENCY)
Facility: MEDICAL CENTER | Age: 34
End: 2024-10-03
Attending: EMERGENCY MEDICINE
Payer: COMMERCIAL

## 2024-10-03 VITALS
BODY MASS INDEX: 29.22 KG/M2 | HEART RATE: 100 BPM | OXYGEN SATURATION: 94 % | SYSTOLIC BLOOD PRESSURE: 130 MMHG | HEIGHT: 73 IN | DIASTOLIC BLOOD PRESSURE: 87 MMHG | RESPIRATION RATE: 18 BRPM | TEMPERATURE: 98.1 F | WEIGHT: 220.46 LBS

## 2024-10-03 LAB
GLIADIN IGA SER IA-ACNC: <0.72 FLU (ref 0–4.99)
TTG IGA SER IA-ACNC: <1.02 FLU (ref 0–4.99)

## 2024-10-03 PROCEDURE — 302449 STATCHG TRIAGE ONLY (STATISTIC)

## 2024-10-03 ASSESSMENT — FIBROSIS 4 INDEX: FIB4 SCORE: 1.15

## 2024-10-04 ENCOUNTER — OFFICE VISIT (OUTPATIENT)
Dept: URGENT CARE | Facility: PHYSICIAN GROUP | Age: 34
End: 2024-10-04
Payer: COMMERCIAL

## 2024-10-04 ENCOUNTER — HOSPITAL ENCOUNTER (OUTPATIENT)
Dept: LAB | Facility: MEDICAL CENTER | Age: 34
End: 2024-10-04
Attending: SPECIALIST
Payer: COMMERCIAL

## 2024-10-04 VITALS
BODY MASS INDEX: 29.28 KG/M2 | DIASTOLIC BLOOD PRESSURE: 84 MMHG | HEART RATE: 97 BPM | WEIGHT: 220.9 LBS | OXYGEN SATURATION: 96 % | RESPIRATION RATE: 18 BRPM | TEMPERATURE: 98.1 F | SYSTOLIC BLOOD PRESSURE: 126 MMHG | HEIGHT: 73 IN

## 2024-10-04 DIAGNOSIS — R42 DIZZINESS: ICD-10-CM

## 2024-10-04 DIAGNOSIS — R53.83 OTHER FATIGUE: ICD-10-CM

## 2024-10-04 DIAGNOSIS — R53.1 WEAKNESS: ICD-10-CM

## 2024-10-04 DIAGNOSIS — H53.8 BLURRY VISION: ICD-10-CM

## 2024-10-04 DIAGNOSIS — R53.83 FATIGUE, UNSPECIFIED TYPE: ICD-10-CM

## 2024-10-04 LAB
ERYTHROCYTE [SEDIMENTATION RATE] IN BLOOD BY WESTERGREN METHOD: 5 MM/HOUR (ref 0–20)
GLIADIN IGG SER IA-ACNC: <0.56 FLU (ref 0–4.99)
RHEUMATOID FACT SER IA-ACNC: <10 IU/ML (ref 0–14)
TTG IGG SER IA-ACNC: <0.82 FLU (ref 0–4.99)

## 2024-10-04 PROCEDURE — 36415 COLL VENOUS BLD VENIPUNCTURE: CPT

## 2024-10-04 PROCEDURE — 99213 OFFICE O/P EST LOW 20 MIN: CPT | Performed by: PHYSICIAN ASSISTANT

## 2024-10-04 PROCEDURE — 3074F SYST BP LT 130 MM HG: CPT | Performed by: PHYSICIAN ASSISTANT

## 2024-10-04 PROCEDURE — 86042 ACETYLCHOLN RCPTR BLCKG ANTB: CPT

## 2024-10-04 PROCEDURE — 86431 RHEUMATOID FACTOR QUANT: CPT

## 2024-10-04 PROCEDURE — 86038 ANTINUCLEAR ANTIBODIES: CPT

## 2024-10-04 PROCEDURE — 86366 MUSCLE-SPECIFIC KINASE ANTB: CPT

## 2024-10-04 PROCEDURE — 3079F DIAST BP 80-89 MM HG: CPT | Performed by: PHYSICIAN ASSISTANT

## 2024-10-04 PROCEDURE — 86041 ACETYLCHOLN RCPTR BNDNG ANTB: CPT

## 2024-10-04 PROCEDURE — 85652 RBC SED RATE AUTOMATED: CPT

## 2024-10-04 ASSESSMENT — FIBROSIS 4 INDEX: FIB4 SCORE: 1.15

## 2024-10-04 ASSESSMENT — ENCOUNTER SYMPTOMS
DIZZINESS: 1
BLURRED VISION: 1

## 2024-10-05 LAB
ACHR BIND AB SER-SCNC: 0 NMOL/L (ref 0–0.4)
ACHR BLOCK AB/ACHR TOTAL SFR SER: 1 % (ref 0–26)

## 2024-10-06 LAB
NUCLEAR IGG SER QL IA: NORMAL
VIT B1 BLD-MCNC: 126 NMOL/L (ref 70–180)

## 2024-10-07 LAB
ACHR BIND AB SER-SCNC: 0 NMOL/L (ref 0–0.4)
ACHR BLOCK AB/ACHR TOTAL SFR SER: 17 % (ref 0–26)

## 2024-10-08 LAB — MUSK AB SER QL: NORMAL

## 2024-10-11 ENCOUNTER — HOSPITAL ENCOUNTER (EMERGENCY)
Facility: MEDICAL CENTER | Age: 34
End: 2024-10-11
Attending: EMERGENCY MEDICINE
Payer: COMMERCIAL

## 2024-10-11 VITALS
SYSTOLIC BLOOD PRESSURE: 132 MMHG | HEIGHT: 73 IN | RESPIRATION RATE: 18 BRPM | TEMPERATURE: 97.9 F | DIASTOLIC BLOOD PRESSURE: 71 MMHG | HEART RATE: 70 BPM | BODY MASS INDEX: 28.75 KG/M2 | OXYGEN SATURATION: 96 % | WEIGHT: 216.93 LBS

## 2024-10-11 DIAGNOSIS — R19.5 ABNORMAL STOOLS: ICD-10-CM

## 2024-10-11 DIAGNOSIS — H53.8 BLURRY VISION, BILATERAL: ICD-10-CM

## 2024-10-11 LAB
ALBUMIN SERPL BCP-MCNC: 4.6 G/DL (ref 3.2–4.9)
ALBUMIN/GLOB SERPL: 1.7 G/DL
ALP SERPL-CCNC: 68 U/L (ref 30–99)
ALT SERPL-CCNC: 12 U/L (ref 2–50)
ANION GAP SERPL CALC-SCNC: 14 MMOL/L (ref 7–16)
AST SERPL-CCNC: 14 U/L (ref 12–45)
BASOPHILS # BLD AUTO: 0.5 % (ref 0–1.8)
BASOPHILS # BLD: 0.02 K/UL (ref 0–0.12)
BILIRUB SERPL-MCNC: 1.4 MG/DL (ref 0.1–1.5)
BUN SERPL-MCNC: 11 MG/DL (ref 8–22)
CALCIUM ALBUM COR SERPL-MCNC: 9.2 MG/DL (ref 8.5–10.5)
CALCIUM SERPL-MCNC: 9.7 MG/DL (ref 8.5–10.5)
CHLORIDE SERPL-SCNC: 101 MMOL/L (ref 96–112)
CO2 SERPL-SCNC: 22 MMOL/L (ref 20–33)
CREAT SERPL-MCNC: 0.97 MG/DL (ref 0.5–1.4)
EOSINOPHIL # BLD AUTO: 0.02 K/UL (ref 0–0.51)
EOSINOPHIL NFR BLD: 0.5 % (ref 0–6.9)
ERYTHROCYTE [DISTWIDTH] IN BLOOD BY AUTOMATED COUNT: 39.3 FL (ref 35.9–50)
GFR SERPLBLD CREATININE-BSD FMLA CKD-EPI: 105 ML/MIN/1.73 M 2
GLOBULIN SER CALC-MCNC: 2.7 G/DL (ref 1.9–3.5)
GLUCOSE SERPL-MCNC: 97 MG/DL (ref 65–99)
HCT VFR BLD AUTO: 44.9 % (ref 42–52)
HGB BLD-MCNC: 15.8 G/DL (ref 14–18)
IMM GRANULOCYTES # BLD AUTO: 0.04 K/UL (ref 0–0.11)
IMM GRANULOCYTES NFR BLD AUTO: 0.9 % (ref 0–0.9)
LIPASE SERPL-CCNC: 25 U/L (ref 11–82)
LYMPHOCYTES # BLD AUTO: 1.45 K/UL (ref 1–4.8)
LYMPHOCYTES NFR BLD: 32.7 % (ref 22–41)
MCH RBC QN AUTO: 31.5 PG (ref 27–33)
MCHC RBC AUTO-ENTMCNC: 35.2 G/DL (ref 32.3–36.5)
MCV RBC AUTO: 89.4 FL (ref 81.4–97.8)
MONOCYTES # BLD AUTO: 0.29 K/UL (ref 0–0.85)
MONOCYTES NFR BLD AUTO: 6.5 % (ref 0–13.4)
NEUTROPHILS # BLD AUTO: 2.62 K/UL (ref 1.82–7.42)
NEUTROPHILS NFR BLD: 58.9 % (ref 44–72)
NRBC # BLD AUTO: 0 K/UL
NRBC BLD-RTO: 0 /100 WBC (ref 0–0.2)
PLATELET # BLD AUTO: 183 K/UL (ref 164–446)
PMV BLD AUTO: 11.6 FL (ref 9–12.9)
POTASSIUM SERPL-SCNC: 4.1 MMOL/L (ref 3.6–5.5)
PROT SERPL-MCNC: 7.3 G/DL (ref 6–8.2)
RBC # BLD AUTO: 5.02 M/UL (ref 4.7–6.1)
SODIUM SERPL-SCNC: 137 MMOL/L (ref 135–145)
WBC # BLD AUTO: 4.4 K/UL (ref 4.8–10.8)

## 2024-10-11 PROCEDURE — 85025 COMPLETE CBC W/AUTO DIFF WBC: CPT

## 2024-10-11 PROCEDURE — 99284 EMERGENCY DEPT VISIT MOD MDM: CPT

## 2024-10-11 PROCEDURE — 80053 COMPREHEN METABOLIC PANEL: CPT

## 2024-10-11 PROCEDURE — 83690 ASSAY OF LIPASE: CPT

## 2024-10-11 PROCEDURE — 36415 COLL VENOUS BLD VENIPUNCTURE: CPT

## 2024-10-11 ASSESSMENT — FIBROSIS 4 INDEX: FIB4 SCORE: 1.15

## 2024-10-17 ENCOUNTER — OFFICE VISIT (OUTPATIENT)
Dept: MEDICAL GROUP | Facility: PHYSICIAN GROUP | Age: 34
End: 2024-10-17
Payer: COMMERCIAL

## 2024-10-17 VITALS
HEIGHT: 73 IN | SYSTOLIC BLOOD PRESSURE: 124 MMHG | HEART RATE: 82 BPM | WEIGHT: 217 LBS | DIASTOLIC BLOOD PRESSURE: 76 MMHG | TEMPERATURE: 98.2 F | OXYGEN SATURATION: 99 % | BODY MASS INDEX: 28.76 KG/M2

## 2024-10-17 DIAGNOSIS — R20.0 NUMBNESS AND TINGLING OF BOTH LOWER EXTREMITIES: ICD-10-CM

## 2024-10-17 DIAGNOSIS — H69.92 EUSTACHIAN TUBE DISORDER, LEFT: ICD-10-CM

## 2024-10-17 DIAGNOSIS — R53.83 OTHER FATIGUE: ICD-10-CM

## 2024-10-17 DIAGNOSIS — R20.2 NUMBNESS AND TINGLING OF BOTH LOWER EXTREMITIES: ICD-10-CM

## 2024-10-17 DIAGNOSIS — R63.0 DECREASED APPETITE: ICD-10-CM

## 2024-10-17 PROCEDURE — 3078F DIAST BP <80 MM HG: CPT | Performed by: NURSE PRACTITIONER

## 2024-10-17 PROCEDURE — 3074F SYST BP LT 130 MM HG: CPT | Performed by: NURSE PRACTITIONER

## 2024-10-17 PROCEDURE — 99213 OFFICE O/P EST LOW 20 MIN: CPT | Performed by: NURSE PRACTITIONER

## 2024-10-17 ASSESSMENT — FIBROSIS 4 INDEX: FIB4 SCORE: 0.75

## 2024-10-22 ENCOUNTER — NON-PROVIDER VISIT (OUTPATIENT)
Dept: NEUROLOGY | Facility: MEDICAL CENTER | Age: 34
End: 2024-10-22
Attending: SPECIALIST
Payer: COMMERCIAL

## 2024-10-22 DIAGNOSIS — R53.1 WEAKNESS: ICD-10-CM

## 2024-10-22 PROCEDURE — 95886 MUSC TEST DONE W/N TEST COMP: CPT | Performed by: SPECIALIST

## 2024-10-22 PROCEDURE — 95886 MUSC TEST DONE W/N TEST COMP: CPT | Mod: 26,RT | Performed by: SPECIALIST

## 2024-10-22 PROCEDURE — 95910 NRV CNDJ TEST 7-8 STUDIES: CPT | Mod: 26 | Performed by: SPECIALIST

## 2024-10-22 PROCEDURE — 95910 NRV CNDJ TEST 7-8 STUDIES: CPT | Performed by: SPECIALIST

## 2024-11-04 ENCOUNTER — OFFICE VISIT (OUTPATIENT)
Dept: NEUROLOGY | Facility: MEDICAL CENTER | Age: 34
End: 2024-11-04
Attending: SPECIALIST
Payer: COMMERCIAL

## 2024-11-04 VITALS
SYSTOLIC BLOOD PRESSURE: 112 MMHG | TEMPERATURE: 97.6 F | BODY MASS INDEX: 30.04 KG/M2 | WEIGHT: 226.63 LBS | HEIGHT: 73 IN | HEART RATE: 60 BPM | OXYGEN SATURATION: 98 % | DIASTOLIC BLOOD PRESSURE: 78 MMHG | RESPIRATION RATE: 16 BRPM

## 2024-11-04 DIAGNOSIS — R53.1 WEAKNESS: ICD-10-CM

## 2024-11-04 PROCEDURE — 99211 OFF/OP EST MAY X REQ PHY/QHP: CPT | Performed by: SPECIALIST

## 2024-11-04 PROCEDURE — 3074F SYST BP LT 130 MM HG: CPT | Performed by: SPECIALIST

## 2024-11-04 PROCEDURE — 3078F DIAST BP <80 MM HG: CPT | Performed by: SPECIALIST

## 2024-11-04 PROCEDURE — 99213 OFFICE O/P EST LOW 20 MIN: CPT | Performed by: SPECIALIST

## 2024-11-04 ASSESSMENT — FIBROSIS 4 INDEX: FIB4 SCORE: 0.75

## 2024-11-04 ASSESSMENT — PATIENT HEALTH QUESTIONNAIRE - PHQ9: CLINICAL INTERPRETATION OF PHQ2 SCORE: 0

## 2024-11-05 ENCOUNTER — APPOINTMENT (OUTPATIENT)
Dept: URGENT CARE | Facility: PHYSICIAN GROUP | Age: 34
End: 2024-11-05
Payer: COMMERCIAL

## 2024-11-05 NOTE — PROGRESS NOTES
"Subjective     Waldemar Ohara is a 34 y.o. male who presents with Follow-Up (Other fatigue/)            HPI  Since I saw him on October 2 Mr.German Josh Ohara has continued to report fatigue, \"brain fog\" and generalized weakness.  His antigliadin antibodies are negative, his myasthenic studies are also negative as well as anti-MuSK antibodies.  His brain MRI scan on August 7 was unremarkable.  He does not have findings suspicious for multiple sclerosis.  He tested positive for COVID on August 31.  He has been referred for sleep study but that has not yet been done.    Past medical history:    1.  Persistent complaints of brain fog and weakness as above.    2.  Otherwise denies surgical or other medical illness    Medication-multiple vitamins    Allergies -none noted    ROS  As above, he reports difficulty concentrating, fatigue and reports abnormal stools.  He does have a referral for sleep study and a referral to GI.         Objective     /78 (BP Location: Left arm, Patient Position: Sitting, BP Cuff Size: Adult)   Pulse 60   Temp 36.4 °C (97.6 °F) (Temporal)   Resp 16   Ht 1.854 m (6' 1\")   Wt 103 kg (226 lb 10.1 oz)   SpO2 98%   BMI 29.90 kg/m²      Physical Exam  Patient is a cooperative gentleman who is alert and appears his stated age.  He presents a coherent history and mental status is grossly intact.    HEENT exam reveals a normocephalic and atraumatic.  Pupils are equal round reactive.  EOMs are full visual fields are full.  He had no nystagmus.    His neck is supple.      Neurological Exam  He stands without difficulty.  His gait is narrow-based and unremarkable.  He has no drift and no dysmetria.  Rapid alternating movements of the hands are symmetric.    Motor testing reveals intact bulk tone and strength throughout.    Sensory testing is intact to temperature and vibration.    Reflexes are symmetric and normal active.  Toes are downgoing.    Cranial nerves are " unremarkable.           Assessment & Plan   Mr. Waldemar Blackburn is a 34-year-old gentleman who complains of a 3-month history of complaints of brain fog, fatigue and generalized weakness.  He did have an infection with COVID around the time his symptoms started and it certainly possible that this is due to the viral infection.  He appears intact neurologically and has no evidence of neuromuscular disease.  His EMG on October 22 was within normal limits.  He has been referred for a sleep study.  I will see him back on a as needed basis but he is urged to follow through with a sleep study.     Assessment & Plan

## 2024-11-06 ENCOUNTER — OFFICE VISIT (OUTPATIENT)
Dept: URGENT CARE | Facility: PHYSICIAN GROUP | Age: 34
End: 2024-11-06
Payer: COMMERCIAL

## 2024-11-06 VITALS
RESPIRATION RATE: 17 BRPM | DIASTOLIC BLOOD PRESSURE: 80 MMHG | TEMPERATURE: 98.4 F | HEIGHT: 73 IN | SYSTOLIC BLOOD PRESSURE: 128 MMHG | WEIGHT: 220 LBS | HEART RATE: 67 BPM | OXYGEN SATURATION: 98 % | BODY MASS INDEX: 29.16 KG/M2

## 2024-11-06 DIAGNOSIS — J02.9 SORE THROAT: ICD-10-CM

## 2024-11-06 LAB — S PYO DNA SPEC NAA+PROBE: NOT DETECTED

## 2024-11-06 PROCEDURE — 3074F SYST BP LT 130 MM HG: CPT | Performed by: FAMILY MEDICINE

## 2024-11-06 PROCEDURE — 87651 STREP A DNA AMP PROBE: CPT | Performed by: FAMILY MEDICINE

## 2024-11-06 PROCEDURE — 3079F DIAST BP 80-89 MM HG: CPT | Performed by: FAMILY MEDICINE

## 2024-11-06 PROCEDURE — 99213 OFFICE O/P EST LOW 20 MIN: CPT | Performed by: FAMILY MEDICINE

## 2024-11-06 ASSESSMENT — FIBROSIS 4 INDEX: FIB4 SCORE: 0.75

## 2024-11-06 NOTE — PROGRESS NOTES
"  Subjective:      34 y.o. male presents to urgent care for sore throat that started Monday. He has no other cold symptoms such as headache or cough. No tobacco product use.  No history of asthma or COPD.  He is not vaccinated against COVID.  No known sick contacts.    He denies any other questions or concerns at this time.    Current problem list, medication, and past medical/surgical history were reviewed in Epic.    ROS  See HPI     Objective:      /80   Pulse 67   Temp 36.9 °C (98.4 °F)   Resp 17   Ht 1.854 m (6' 1\")   Wt 99.8 kg (220 lb)   SpO2 98%   BMI 29.03 kg/m²     Physical Exam  Constitutional:       General: He is not in acute distress.     Appearance: He is not diaphoretic.   HENT:      Right Ear: Tympanic membrane, ear canal and external ear normal.      Left Ear: Tympanic membrane, ear canal and external ear normal.      Mouth/Throat:      Tongue: Tongue does not deviate from midline.      Palate: No lesions.      Pharynx: Uvula midline. Posterior oropharyngeal erythema present. No oropharyngeal exudate.      Tonsils: No tonsillar exudate. 1+ on the right. 1+ on the left.   Cardiovascular:      Rate and Rhythm: Normal rate and regular rhythm.      Heart sounds: Normal heart sounds.   Pulmonary:      Effort: Pulmonary effort is normal. No respiratory distress.      Breath sounds: Normal breath sounds.   Neurological:      Mental Status: He is alert.   Psychiatric:         Mood and Affect: Affect normal.         Judgment: Judgment normal.       Assessment/Plan:     1. Sore throat  Rapid strep negative. Most consistent with virus.  Tylenol, ibuprofen, and gargle warm salt water as needed for symptomatic relief.  - POCT CEPHEID GROUP A STREP - PCR      Instructed to return to Urgent Care or nearest Emergency Department if symptoms fail to improve, for any change in condition, further concerns, or new concerning symptoms. Patient states understanding of the plan of care and discharge " instructions.    Lelia Faustin M.D.

## 2024-11-10 ENCOUNTER — HOSPITAL ENCOUNTER (EMERGENCY)
Facility: MEDICAL CENTER | Age: 34
End: 2024-11-11
Attending: STUDENT IN AN ORGANIZED HEALTH CARE EDUCATION/TRAINING PROGRAM
Payer: COMMERCIAL

## 2024-11-10 DIAGNOSIS — R53.83 FATIGUE, UNSPECIFIED TYPE: ICD-10-CM

## 2024-11-10 DIAGNOSIS — K92.1 HEMATOCHEZIA: ICD-10-CM

## 2024-11-10 LAB
ABO + RH BLD: NORMAL
ABO GROUP BLD: NORMAL
ALBUMIN SERPL BCP-MCNC: 4.7 G/DL (ref 3.2–4.9)
ALBUMIN/GLOB SERPL: 1.4 G/DL
ALP SERPL-CCNC: 75 U/L (ref 30–99)
ALT SERPL-CCNC: 15 U/L (ref 2–50)
ANION GAP SERPL CALC-SCNC: 15 MMOL/L (ref 7–16)
APTT PPP: 28.1 SEC (ref 24.7–36)
AST SERPL-CCNC: 16 U/L (ref 12–45)
BASOPHILS # BLD AUTO: 0.2 % (ref 0–1.8)
BASOPHILS # BLD: 0.02 K/UL (ref 0–0.12)
BILIRUB SERPL-MCNC: 1.1 MG/DL (ref 0.1–1.5)
BLD GP AB SCN SERPL QL: NORMAL
BUN SERPL-MCNC: 15 MG/DL (ref 8–22)
CALCIUM ALBUM COR SERPL-MCNC: 9.5 MG/DL (ref 8.5–10.5)
CALCIUM SERPL-MCNC: 10.1 MG/DL (ref 8.5–10.5)
CHLORIDE SERPL-SCNC: 99 MMOL/L (ref 96–112)
CO2 SERPL-SCNC: 20 MMOL/L (ref 20–33)
CREAT SERPL-MCNC: 1.1 MG/DL (ref 0.5–1.4)
EKG IMPRESSION: NORMAL
EOSINOPHIL # BLD AUTO: 0.01 K/UL (ref 0–0.51)
EOSINOPHIL NFR BLD: 0.1 % (ref 0–6.9)
ERYTHROCYTE [DISTWIDTH] IN BLOOD BY AUTOMATED COUNT: 39 FL (ref 35.9–50)
GFR SERPLBLD CREATININE-BSD FMLA CKD-EPI: 90 ML/MIN/1.73 M 2
GLOBULIN SER CALC-MCNC: 3.3 G/DL (ref 1.9–3.5)
GLUCOSE SERPL-MCNC: 108 MG/DL (ref 65–99)
HCT VFR BLD AUTO: 45.6 % (ref 42–52)
HGB BLD-MCNC: 15.7 G/DL (ref 14–18)
IMM GRANULOCYTES # BLD AUTO: 0.05 K/UL (ref 0–0.11)
IMM GRANULOCYTES NFR BLD AUTO: 0.5 % (ref 0–0.9)
INR PPP: 1.07 (ref 0.87–1.13)
LIPASE SERPL-CCNC: 28 U/L (ref 11–82)
LYMPHOCYTES # BLD AUTO: 1.38 K/UL (ref 1–4.8)
LYMPHOCYTES NFR BLD: 14.9 % (ref 22–41)
MCH RBC QN AUTO: 30.4 PG (ref 27–33)
MCHC RBC AUTO-ENTMCNC: 34.4 G/DL (ref 32.3–36.5)
MCV RBC AUTO: 88.2 FL (ref 81.4–97.8)
MONOCYTES # BLD AUTO: 0.37 K/UL (ref 0–0.85)
MONOCYTES NFR BLD AUTO: 4 % (ref 0–13.4)
NEUTROPHILS # BLD AUTO: 7.45 K/UL (ref 1.82–7.42)
NEUTROPHILS NFR BLD: 80.3 % (ref 44–72)
NRBC # BLD AUTO: 0 K/UL
NRBC BLD-RTO: 0 /100 WBC (ref 0–0.2)
PLATELET # BLD AUTO: 197 K/UL (ref 164–446)
PMV BLD AUTO: 11.6 FL (ref 9–12.9)
POTASSIUM SERPL-SCNC: 3.5 MMOL/L (ref 3.6–5.5)
PROT SERPL-MCNC: 8 G/DL (ref 6–8.2)
PROTHROMBIN TIME: 14 SEC (ref 12–14.6)
RBC # BLD AUTO: 5.17 M/UL (ref 4.7–6.1)
RH BLD: NORMAL
SODIUM SERPL-SCNC: 134 MMOL/L (ref 135–145)
WBC # BLD AUTO: 9.3 K/UL (ref 4.8–10.8)

## 2024-11-10 PROCEDURE — 86900 BLOOD TYPING SEROLOGIC ABO: CPT

## 2024-11-10 PROCEDURE — 85730 THROMBOPLASTIN TIME PARTIAL: CPT

## 2024-11-10 PROCEDURE — 85610 PROTHROMBIN TIME: CPT

## 2024-11-10 PROCEDURE — 85025 COMPLETE CBC W/AUTO DIFF WBC: CPT

## 2024-11-10 PROCEDURE — 80053 COMPREHEN METABOLIC PANEL: CPT

## 2024-11-10 PROCEDURE — 36415 COLL VENOUS BLD VENIPUNCTURE: CPT

## 2024-11-10 PROCEDURE — 93005 ELECTROCARDIOGRAM TRACING: CPT | Performed by: STUDENT IN AN ORGANIZED HEALTH CARE EDUCATION/TRAINING PROGRAM

## 2024-11-10 PROCEDURE — 83690 ASSAY OF LIPASE: CPT

## 2024-11-10 PROCEDURE — 86901 BLOOD TYPING SEROLOGIC RH(D): CPT | Mod: 91

## 2024-11-10 PROCEDURE — 86850 RBC ANTIBODY SCREEN: CPT

## 2024-11-10 PROCEDURE — 99283 EMERGENCY DEPT VISIT LOW MDM: CPT

## 2024-11-10 PROCEDURE — 93005 ELECTROCARDIOGRAM TRACING: CPT

## 2024-11-10 ASSESSMENT — FIBROSIS 4 INDEX: FIB4 SCORE: 0.75

## 2024-11-10 ASSESSMENT — PAIN DESCRIPTION - PAIN TYPE: TYPE: ACUTE PAIN

## 2024-11-11 ENCOUNTER — OFFICE VISIT (OUTPATIENT)
Dept: MEDICAL GROUP | Facility: PHYSICIAN GROUP | Age: 34
End: 2024-11-11
Payer: COMMERCIAL

## 2024-11-11 VITALS
SYSTOLIC BLOOD PRESSURE: 159 MMHG | HEIGHT: 73 IN | BODY MASS INDEX: 29.28 KG/M2 | WEIGHT: 220.9 LBS | OXYGEN SATURATION: 97 % | RESPIRATION RATE: 18 BRPM | DIASTOLIC BLOOD PRESSURE: 95 MMHG | TEMPERATURE: 98.7 F | HEART RATE: 97 BPM

## 2024-11-11 VITALS
DIASTOLIC BLOOD PRESSURE: 76 MMHG | BODY MASS INDEX: 29.03 KG/M2 | WEIGHT: 219 LBS | HEART RATE: 76 BPM | OXYGEN SATURATION: 98 % | TEMPERATURE: 98.4 F | SYSTOLIC BLOOD PRESSURE: 134 MMHG | HEIGHT: 73 IN

## 2024-11-11 DIAGNOSIS — H00.011 HORDEOLUM EXTERNUM OF RIGHT UPPER EYELID: ICD-10-CM

## 2024-11-11 DIAGNOSIS — Z02.89 ENCOUNTER FOR COMPLETION OF FORM WITH PATIENT: ICD-10-CM

## 2024-11-11 PROCEDURE — 7101 PR PHYSICAL: Performed by: NURSE PRACTITIONER

## 2024-11-11 PROCEDURE — 99213 OFFICE O/P EST LOW 20 MIN: CPT | Performed by: NURSE PRACTITIONER

## 2024-11-11 RX ORDER — NEOMYCIN SULFATE, POLYMYXIN B SULFATE, AND DEXAMETHASONE 3.5; 10000; 1 MG/G; [USP'U]/G; MG/G
OINTMENT OPHTHALMIC
COMMUNITY
Start: 2024-11-08

## 2024-11-11 ASSESSMENT — FIBROSIS 4 INDEX: FIB4 SCORE: 0.71

## 2024-11-11 NOTE — ED PROVIDER NOTES
ED Provider Note    CHIEF COMPLAINT  Chief Complaint   Patient presents with    Dizziness    Weakness    Upper GI Bleed     Blood in stool.        EXTERNAL RECORDS REVIEWED  Outpatient Notes patient was seen by his family practitioner on 11/6/2024 for evaluation of a sore throat and was diagnosed with a likely viral URI.  He saw his neurologist 11/4/2024 for evaluation of 3-month history of brain fog fatigue and generalized weakness.  He was recommended to obtain an outpatient sleep study.  No evidence of neuromuscular disease.  Had an EMG in October that was normal.    HPI/ROS  LIMITATION TO HISTORY   Select: : None      Waldemar Ohara is a 34 y.o. male who presents to the emergency department for evaluation of bloody stool.  Patient reports that this is occurring in the setting of an outpatient workup for progressive generalized weakness and fatigue as well as some intermittent stool changes including orange discoloration of his stool, white and red specks.  He states he has a colonoscopy scheduled on Tuesday to further evaluate this.  He reports that today after a bowel movement he noted bright red blood on the toilet paper.  There was no blood in the bowl or surrounding his stool however.  He reports ongoing generalized weakness and fatigue including today.  He denies lightheadedness, syncope, chest pain or abdominal pain, vomiting or hematemesis.  He is otherwise healthy but no history of liver disease, cirrhosis, heavy alcohol use.    PAST MEDICAL HISTORY   has a past medical history of COVID.    SURGICAL HISTORY  patient denies any surgical history    FAMILY HISTORY  Family History   Problem Relation Age of Onset    No Known Problems Mother     Hypertension Father     Hyperlipidemia Father     Hypertension Brother     Obesity Brother     No Known Problems Maternal Grandmother     No Known Problems Maternal Grandfather     No Known Problems Paternal Grandmother     No Known Problems Paternal  "Grandfather        SOCIAL HISTORY  Social History     Tobacco Use    Smoking status: Never    Smokeless tobacco: Never   Vaping Use    Vaping status: Never Used   Substance and Sexual Activity    Alcohol use: Not Currently     Comment: rare    Drug use: Never    Sexual activity: Not Currently     Partners: Female     Birth control/protection: None       CURRENT MEDICATIONS  Home Medications       Reviewed by Josefina Mejia R.N. (Registered Nurse) on 11/10/24 at 2217  Med List Status: Not Addressed     Medication Last Dose Status        Patient Simon Taking any Medications                           ALLERGIES  No Known Allergies    PHYSICAL EXAM  VITAL SIGNS: BP (!) 159/95   Pulse 97   Temp 37.1 °C (98.7 °F) (Temporal)   Resp 18   Ht 1.854 m (6' 1\")   Wt 100 kg (220 lb 14.4 oz)   SpO2 97%   BMI 29.14 kg/m²    Constitutional: No acute distress, pleasant and very well-appearing  HEENT: Atraumatic, normocephalic, pupils are equal round reactive to light, no conjunctival pallor, nose normal, mouth shows moist mucous membranes  Neck: Supple, no JVD, no tracheal deviation  Cardiovascular: Regular rate and rhythm, no murmur, rub or gallop, 2+ pulses peripherally x4  Thorax & Lungs: No respiratory distress, no wheezes, rales or rhonchi, no chest wall tenderness.  GI: Soft, non-distended, non-tender, no rebound  Rectal: No palpable external or internal hemorrhoids.  No melena.  No hematochezia.  No fissures.  Skin: Warm, dry, no pallor  Musculoskeletal: Moving all extremities, no acute deformity, no edema, no tenderness  Neurologic: A&Ox3, at baseline mentation, cranial nerves II through XII are grossly intact, no sensory deficit, no ataxia  Psychiatric: Appropriate affect for situation at this time          EKG/LABS  Labs Reviewed   CBC WITH DIFFERENTIAL - Abnormal; Notable for the following components:       Result Value    Neutrophils-Polys 80.30 (*)     Lymphocytes 14.90 (*)     Neutrophils (Absolute) 7.45 (*)  "    All other components within normal limits   COMP METABOLIC PANEL - Abnormal; Notable for the following components:    Sodium 134 (*)     Potassium 3.5 (*)     Glucose 108 (*)     All other components within normal limits   COD (ADULT)   LIPASE   PROTHROMBIN TIME   APTT   ABO RH CONFIRM   ESTIMATED GFR       COURSE & MEDICAL DECISION MAKING    ASSESSMENT, COURSE AND PLAN  Care Narrative:     Patient presents emergency department for evaluation of blood in the stool in the setting of longstanding history of generalized weakness and fatigue.  He is already scheduled for colonoscopy on an outpatient basis on Tuesday of this coming week.  His vitals are stable and he is well-appearing at the time of my assessment with no melena or hematochezia or active bleeding on rectal examination.  He likewise has no evidence of hemorrhoids or fissures.  Laboratory workup undertaken without evidence of acute blood loss anemia, coagulopathy or evidence of endorgan hypoperfusion.  Vital signs remained stable while in the department.  Possible diverticulosis versus more proximal internal hemorrhoid, inflammatory bowel disease and I am glad the patient has outpatient follow-up already scheduled with gastroenterology so promptly.  At this point however I do not feel he necessitates any further period of observation or admission to the hospital as he has a reassuring [place] Blatchford score of 1.  He feels comfortable with outpatient follow-up plan and was discharged stable condition.      ADDITIONAL PROBLEMS MANAGED  None    DISPOSITION AND DISCUSSIONS  I have discussed management of the patient with the following physicians and DRE's: None    Escalation of care considered, and ultimately not performed:acute inpatient care management, however at this time, the patient is most appropriate for outpatient management    Decision tools and prescription drugs considered including, but not limited to:  [place] Blatchford score 1 .    FINAL  DIAGNOSIS  1. Hematochezia    2. Fatigue, unspecified type         Electronically signed by: Fernando Trimble M.D., 11/10/2024 10:42 PM

## 2024-11-11 NOTE — ED TRIAGE NOTES
Pt ambulated into triage with c/o blood in stool, feeling weak, and abdominal pain. Pt sts he has a colonoscopy scheduled for Tuesday. However, when he wipes he notices bright red blood. Pt sts the General weakness and lightheadedness have been for over a week. Protocol orders placed. Pt Is A&O and ambulatory with a steady gait. Awaiting ER room

## 2024-11-11 NOTE — DISCHARGE INSTRUCTIONS
Please follow-up for your colonoscopy as scheduled on Tuesday.  Return to the ER with any heavier bleeding, if you become lightheaded or pass out.

## 2024-11-12 NOTE — PROGRESS NOTES
"Subjective:     CC: paperwork    HPI:   Waldemar presents today with the following:    He would like an updated date for return to work 12/1/2024. He has colonoscopy tomorrow. Previous return to work date was 10/27/2024.    Right eye stye started 2 weeks ago. He was recently at the eye doctor and had neomycin-polymixin-dexamethasone ophthalmic ointment prescribed.  He has taken the prescription for the last 3 days but is only taken 1 dose today.  He has noticed that the right upper eye started with a small area of redness and now the redness has increased.  He is not having any pain, discharge or worsening vision changes.  He plans to call his eye doctor tomorrow to get an appointment.  Discussion today to hold antibiotic and start warm compresses.      Past Medical History:   Diagnosis Date    COVID        Social History     Tobacco Use    Smoking status: Never    Smokeless tobacco: Never   Vaping Use    Vaping status: Never Used   Substance Use Topics    Alcohol use: Not Currently     Comment: rare    Drug use: Never       Current Outpatient Medications Ordered in Epic   Medication Sig Dispense Refill    neomycin-polymixin-dexamethasone (MAXITROL) 3.5-06120-0.1 Ointment ophthalmic ointment PLEASE SEE ATTACHED FOR DETAILED DIRECTIONS       No current Epic-ordered facility-administered medications on file.       Allergies:  Patient has no known allergies.    Health Maintenance: Completed      Objective:     Vital signs reviewed  Exam:  /76 (BP Location: Right arm, Patient Position: Sitting, BP Cuff Size: Adult)   Pulse 76   Temp 36.9 °C (98.4 °F) (Temporal)   Ht 1.854 m (6' 1\")   Wt 99.3 kg (219 lb)   SpO2 98%   BMI 28.89 kg/m²  Body mass index is 28.89 kg/m².    Gen: Alert and oriented, No apparent distress.  Eye:     right upper sclera with redness, no discharge. Firm palpable mass to right inner upper eye lid.   Lungs: Normal effort, no audible wheezes  CV: Skin pink, warm and dry.  Ext: No clubbing, " cyanosis, edema.      Assessment & Plan:     34 y.o. male with the following -     1. Encounter for completion of form with patient  Acute uncomplicated problem.  [facility] paperwork updated today.  Copy scanned into chart and original provided back to patient.    2. Hordeolum externum of right upper eyelid  Acute uncomplicated problem.  Discussed to hold his neomycin-polymyxin-dexamethasone antibiotic for the evening to see if the redness improves.  May use warm compress.  Avoid touching the eye.  Follow-up with his eye doctor tomorrow.      Return if symptoms worsen or fail to improve.    Please note that this dictation was created using voice recognition software. I have made every reasonable attempt to correct obvious errors, but I expect that there are errors of grammar and possibly content that I did not discover before finalizing the note.

## 2024-11-25 ENCOUNTER — OFFICE VISIT (OUTPATIENT)
Dept: MEDICAL GROUP | Facility: PHYSICIAN GROUP | Age: 34
End: 2024-11-25
Payer: COMMERCIAL

## 2024-11-25 VITALS
WEIGHT: 223 LBS | HEART RATE: 70 BPM | BODY MASS INDEX: 29.55 KG/M2 | HEIGHT: 73 IN | TEMPERATURE: 98.1 F | OXYGEN SATURATION: 98 % | DIASTOLIC BLOOD PRESSURE: 70 MMHG | SYSTOLIC BLOOD PRESSURE: 118 MMHG

## 2024-11-25 DIAGNOSIS — Z02.89 ENCOUNTER FOR COMPLETION OF FORM WITH PATIENT: ICD-10-CM

## 2024-11-25 DIAGNOSIS — H53.8 BLURRY VISION, BILATERAL: ICD-10-CM

## 2024-11-25 DIAGNOSIS — R91.1 NODULE OF LOWER LOBE OF RIGHT LUNG: ICD-10-CM

## 2024-11-25 DIAGNOSIS — R42 DIZZINESS: ICD-10-CM

## 2024-11-25 ASSESSMENT — FIBROSIS 4 INDEX: FIB4 SCORE: 0.71

## 2024-11-25 NOTE — LETTER
[facility]  [place]  [address] [address]  Fax: [phone]   Authorization for Release/Disclosure of   Protected Health Information   Name: MAYO BOWSER : 1990 SSN: [id number]   Address: [address] [place] [id number] Phone:    There are no phone numbers on file.   I authorize the entity listed below to release/disclose the PHI below to:   [facility]/[place] Somers and [place]   Provider or Entity Name:  [facility]    Address   City, State, [facility]   Phone:      Fax:     Reason for request: continuity of care   Information to be released:    [  ] LAST COLONOSCOPY,  including any PATH REPORT and follow-up  [  ] LAST FIT/COLOGUARD RESULT [  ] LAST DEXA  [  ] LAST MAMMOGRAM  [  ] LAST PAP  [  ] LAST LABS [  ] RETINA EXAM REPORT  [  ] IMMUNIZATION RECORDS  [xx] Release all info      [  ] Check here and initial the line next to each item to release ALL health information INCLUDING  _____ Care and treatment for drug and / or alcohol abuse  _____ HIV testing, infection status, or AIDS  _____ Genetic Testing    DATES OF SERVICE OR TIME PERIOD TO BE DISCLOSED: _____________  I understand and acknowledge that:  * This Authorization may be revoked at any time by you in writing, except if your health information has already been used or disclosed.  * Your health information that will be used or disclosed as a result of you signing this authorization could be re-disclosed by the recipient. If this occurs, your re-disclosed health information may no longer be protected by State or Federal laws.  * You may refuse to sign this Authorization. Your refusal will not affect your ability to obtain treatment.  * This Authorization becomes effective upon signing and will  on (date) __________.      If no date is indicated, this Authorization will  one (1) year from the signature date.    Name: Mayo Moreno  Lev  Signature: Date:   11/25/2024     PLEASE FAX REQUESTED RECORDS BACK TO: [phone]

## 2024-11-25 NOTE — PROGRESS NOTES
"Subjective:     CC: Results    HPI:   Waldemar presents today with the following:    He had EGD and Colonoscopy 11/12/2024. Pathology report records received.  Gastric biopsy negative for H. pylori, no dysplasia or intestinal metaplasia.reports after his procedures she was told by GI that reflux esophagitis with no bleeding in esophagus was seen. He is awaiting follow-up results.  Patient reports colonoscopy was normal. He has January appointment scheduled. Weight is up 4 pounds since last visit.  Continues to have intermittent orange stool with black/white spots, GI told him needs to follow-up PCP for stool test. He continues to have intermittent episodes of dizziness and blurry vision. He has followed up with [facility], was told his eyes are healthy. He has questions if symptoms continue, should he have MRI with contrast.  Requesting GI and ophthalmology records.    Past Medical History:   Diagnosis Date    COVID        Social History     Tobacco Use    Smoking status: Never    Smokeless tobacco: Never   Vaping Use    Vaping status: Never Used   Substance Use Topics    Alcohol use: Not Currently     Comment: rare    Drug use: Never       Current Outpatient Medications Ordered in Epic   Medication Sig Dispense Refill    multivitamin Tab Take 1 Tablet by mouth every day.      neomycin-polymixin-dexamethasone (MAXITROL) 3.5-95419-2.1 Ointment ophthalmic ointment PLEASE SEE ATTACHED FOR DETAILED DIRECTIONS (Patient not taking: Reported on 11/25/2024)       No current [facility]-ordered facility-administered medications on file.       Allergies:  Patient has no known allergies.    Health Maintenance: Reviewed      Objective:     Vital signs reviewed  Exam:  /70 (BP Location: Right arm, Patient Position: Sitting, BP Cuff Size: Adult)   Pulse 70   Temp 36.7 °C (98.1 °F) (Temporal)   Ht 1.854 m (6' 1\")   Wt 101 kg (223 lb)   SpO2 98%   BMI 29.42 kg/m²  Body mass index is 29.42 kg/m².    Gen: Alert and " oriented, No apparent distress.  Neck: Neck is supple, full ROM.  Lungs: Normal effort, no audible wheezes  CV: Skin pink, warm and dry.  Ext: No clubbing, cyanosis, edema.      Assessment & Plan:     34 y.o. male with the following -     1. Dizziness  Chronic exacerbated problem.  Blood pressure stable today.  Check MRI with contrast.  - MR-BRAIN-WITH; Future-cancelled    2. Blurry vision, bilateral  Chronic exacerbated problem.  Requesting ophthalmology records.  Check MRI with contrast.  - MR-BRAIN-WITH; Future-cancelled    11/26/2024: received message from imaging, they do no do MRI w/contrast only. New order placed.   - MR-BRAIN-WITH & W/O; Future    3. Nodule of lower lobe of right lung  Chronic stable problem.  Repeat CT scan is scheduled for 12/30/2024.  We discussed referral to the nodule clinic and he is in agreement.  - Referral to Pulmonary and Sleep Medicine    4. Encounter for completion of form with patient  Acute uncomplicated problem.  Received further forms from [facility]. Form completed with patient and faxed to employer.      My total time spent caring for the patient on the day of the encounter was 30 minutes.   This does not include time spent on separately billable procedures/tests.      Return in about 2 months (around 1/25/2025) for Multiple Issues.    Please note that this dictation was created using voice recognition software. I have made every reasonable attempt to correct obvious errors, but I expect that there are errors of grammar and possibly content that I did not discover before finalizing the note.

## 2024-11-25 NOTE — LETTER
[facility]  [place]  [address] [address]  Fax: [phone]   Authorization for Release/Disclosure of   Protected Health Information   Name: MAYO BOWSER : 1990 SSN: [id number]   Address: [address] [place] [id number] Phone:    There are no phone numbers on file.   I authorize the entity listed below to release/disclose the PHI below to:   [facility]/[place] Somers and [place]   Provider or Entity Name:  [facility]    Address   City, State, Zip   Phone:      Fax:     Reason for request: continuity of care   Information to be released:    [ xx ] LAST COLONOSCOPY,  including any PATH REPORT and follow-up  [  ] LAST FIT/COLOGUARD RESULT [  ] LAST DEXA  [  ] LAST MAMMOGRAM  [  ] LAST PAP  [  ] LAST LABS [  ] RETINA EXAM REPORT  [  ] IMMUNIZATION RECORDS  [ xx ] Release all info      [  ] Check here and initial the line next to each item to release ALL health information INCLUDING  _____ Care and treatment for drug and / or alcohol abuse  _____ HIV testing, infection status, or AIDS  _____ Genetic Testing    DATES OF SERVICE OR TIME PERIOD TO BE DISCLOSED: _____________  I understand and acknowledge that:  * This Authorization may be revoked at any time by you in writing, except if your health information has already been used or disclosed.  * Your health information that will be used or disclosed as a result of you signing this authorization could be re-disclosed by the recipient. If this occurs, your re-disclosed health information may no longer be protected by State or Federal laws.  * You may refuse to sign this Authorization. Your refusal will not affect your ability to obtain treatment.  * This Authorization becomes effective upon signing and will  on (date) __________.      If no date is indicated, this Authorization will  one (1) year from the signature date.    Name: Mayo Moreno  Lev  Signature: Date:   11/25/2024     PLEASE FAX REQUESTED RECORDS BACK TO: [phone]

## 2024-12-03 ENCOUNTER — OFFICE VISIT (OUTPATIENT)
Dept: MEDICAL GROUP | Facility: LAB | Age: 34
End: 2024-12-03
Payer: COMMERCIAL

## 2024-12-03 VITALS
DIASTOLIC BLOOD PRESSURE: 72 MMHG | HEIGHT: 73 IN | RESPIRATION RATE: 16 BRPM | WEIGHT: 223.33 LBS | SYSTOLIC BLOOD PRESSURE: 122 MMHG | HEART RATE: 62 BPM | TEMPERATURE: 98.2 F | BODY MASS INDEX: 29.6 KG/M2 | OXYGEN SATURATION: 98 %

## 2024-12-03 DIAGNOSIS — R91.1 LUNG NODULE: ICD-10-CM

## 2024-12-03 DIAGNOSIS — R53.83 OTHER FATIGUE: ICD-10-CM

## 2024-12-03 DIAGNOSIS — K20.90 ESOPHAGITIS: ICD-10-CM

## 2024-12-03 DIAGNOSIS — R19.5 ABNORMAL STOOLS: ICD-10-CM

## 2024-12-03 DIAGNOSIS — R73.03 PREDIABETES: ICD-10-CM

## 2024-12-03 PROBLEM — R63.0 DECREASED APPETITE: Status: RESOLVED | Noted: 2024-09-26 | Resolved: 2024-12-03

## 2024-12-03 PROBLEM — Z09 HOSPITAL DISCHARGE FOLLOW-UP: Status: RESOLVED | Noted: 2024-08-15 | Resolved: 2024-12-03

## 2024-12-03 PROBLEM — F32.1 CURRENT MODERATE EPISODE OF MAJOR DEPRESSIVE DISORDER WITHOUT PRIOR EPISODE (HCC): Status: RESOLVED | Noted: 2024-09-26 | Resolved: 2024-12-03

## 2024-12-03 PROCEDURE — 3078F DIAST BP <80 MM HG: CPT | Performed by: STUDENT IN AN ORGANIZED HEALTH CARE EDUCATION/TRAINING PROGRAM

## 2024-12-03 PROCEDURE — 3074F SYST BP LT 130 MM HG: CPT | Performed by: STUDENT IN AN ORGANIZED HEALTH CARE EDUCATION/TRAINING PROGRAM

## 2024-12-03 PROCEDURE — 99205 OFFICE O/P NEW HI 60 MIN: CPT | Performed by: STUDENT IN AN ORGANIZED HEALTH CARE EDUCATION/TRAINING PROGRAM

## 2024-12-03 RX ORDER — GUANFACINE 1 MG/1
1 TABLET, EXTENDED RELEASE ORAL DAILY
Qty: 30 TABLET | Refills: 0 | Status: SHIPPED | OUTPATIENT
Start: 2024-12-03 | End: 2024-12-26

## 2024-12-03 SDOH — ECONOMIC STABILITY: FOOD INSECURITY: WITHIN THE PAST 12 MONTHS, THE FOOD YOU BOUGHT JUST DIDN'T LAST AND YOU DIDN'T HAVE MONEY TO GET MORE.: NEVER TRUE

## 2024-12-03 SDOH — HEALTH STABILITY: PHYSICAL HEALTH: ON AVERAGE, HOW MANY DAYS PER WEEK DO YOU ENGAGE IN MODERATE TO STRENUOUS EXERCISE (LIKE A BRISK WALK)?: 0 DAYS

## 2024-12-03 SDOH — ECONOMIC STABILITY: INCOME INSECURITY: IN THE LAST 12 MONTHS, WAS THERE A TIME WHEN YOU WERE NOT ABLE TO PAY THE MORTGAGE OR RENT ON TIME?: NO

## 2024-12-03 SDOH — HEALTH STABILITY: MENTAL HEALTH
STRESS IS WHEN SOMEONE FEELS TENSE, NERVOUS, ANXIOUS, OR CAN'T SLEEP AT NIGHT BECAUSE THEIR MIND IS TROUBLED. HOW STRESSED ARE YOU?: ONLY A LITTLE

## 2024-12-03 SDOH — ECONOMIC STABILITY: INCOME INSECURITY: HOW HARD IS IT FOR YOU TO PAY FOR THE VERY BASICS LIKE FOOD, HOUSING, MEDICAL CARE, AND HEATING?: NOT VERY HARD

## 2024-12-03 SDOH — HEALTH STABILITY: PHYSICAL HEALTH: ON AVERAGE, HOW MANY MINUTES DO YOU ENGAGE IN EXERCISE AT THIS LEVEL?: 10 MIN

## 2024-12-03 SDOH — ECONOMIC STABILITY: FOOD INSECURITY: WITHIN THE PAST 12 MONTHS, YOU WORRIED THAT YOUR FOOD WOULD RUN OUT BEFORE YOU GOT MONEY TO BUY MORE.: NEVER TRUE

## 2024-12-03 ASSESSMENT — LIFESTYLE VARIABLES
HOW OFTEN DO YOU HAVE SIX OR MORE DRINKS ON ONE OCCASION: NEVER
HOW OFTEN DO YOU HAVE A DRINK CONTAINING ALCOHOL: NEVER
AUDIT-C TOTAL SCORE: 0
HOW MANY STANDARD DRINKS CONTAINING ALCOHOL DO YOU HAVE ON A TYPICAL DAY: PATIENT DOES NOT DRINK
SKIP TO QUESTIONS 9-10: 1

## 2024-12-03 ASSESSMENT — SOCIAL DETERMINANTS OF HEALTH (SDOH)
IN A TYPICAL WEEK, HOW MANY TIMES DO YOU TALK ON THE PHONE WITH FAMILY, FRIENDS, OR NEIGHBORS?: MORE THAN THREE TIMES A WEEK
HOW OFTEN DO YOU ATTEND CHURCH OR RELIGIOUS SERVICES?: MORE THAN 4 TIMES PER YEAR
HOW HARD IS IT FOR YOU TO PAY FOR THE VERY BASICS LIKE FOOD, HOUSING, MEDICAL CARE, AND HEATING?: NOT VERY HARD
HOW OFTEN DO YOU ATTENT MEETINGS OF THE CLUB OR ORGANIZATION YOU BELONG TO?: NEVER
HOW OFTEN DO YOU GET TOGETHER WITH FRIENDS OR RELATIVES?: ONCE A WEEK
HOW OFTEN DO YOU ATTENT MEETINGS OF THE CLUB OR ORGANIZATION YOU BELONG TO?: NEVER
WITHIN THE PAST 12 MONTHS, YOU WORRIED THAT YOUR FOOD WOULD RUN OUT BEFORE YOU GOT THE MONEY TO BUY MORE: NEVER TRUE
HOW MANY DRINKS CONTAINING ALCOHOL DO YOU HAVE ON A TYPICAL DAY WHEN YOU ARE DRINKING: PATIENT DOES NOT DRINK
IN A TYPICAL WEEK, HOW MANY TIMES DO YOU TALK ON THE PHONE WITH FAMILY, FRIENDS, OR NEIGHBORS?: MORE THAN THREE TIMES A WEEK
ARE YOU MARRIED, WIDOWED, DIVORCED, SEPARATED, NEVER MARRIED, OR LIVING WITH A PARTNER?: LIVING WITH PARTNER
DO YOU BELONG TO ANY CLUBS OR ORGANIZATIONS SUCH AS CHURCH GROUPS UNIONS, FRATERNAL OR ATHLETIC GROUPS, OR SCHOOL GROUPS?: NO
HOW OFTEN DO YOU HAVE SIX OR MORE DRINKS ON ONE OCCASION: NEVER
IN THE PAST 12 MONTHS, HAS THE ELECTRIC, GAS, OIL, OR WATER COMPANY THREATENED TO SHUT OFF SERVICE IN YOUR HOME?: NO
HOW OFTEN DO YOU HAVE A DRINK CONTAINING ALCOHOL: NEVER
DO YOU BELONG TO ANY CLUBS OR ORGANIZATIONS SUCH AS CHURCH GROUPS UNIONS, FRATERNAL OR ATHLETIC GROUPS, OR SCHOOL GROUPS?: NO
HOW OFTEN DO YOU GET TOGETHER WITH FRIENDS OR RELATIVES?: ONCE A WEEK
HOW OFTEN DO YOU ATTEND CHURCH OR RELIGIOUS SERVICES?: MORE THAN 4 TIMES PER YEAR
ARE YOU MARRIED, WIDOWED, DIVORCED, SEPARATED, NEVER MARRIED, OR LIVING WITH A PARTNER?: LIVING WITH PARTNER

## 2024-12-03 ASSESSMENT — FIBROSIS 4 INDEX: FIB4 SCORE: 0.71

## 2024-12-03 NOTE — PROGRESS NOTES
Subjective:     CC: establishing with new provider   Chief Complaint   Patient presents with    Fatigue        HPI:     Problem   Esophagitis    Found on egd, biopsy reportedly normal      Abnormal Stools    Patient reports having orange colored stools with white specks. He first noticed this in September. He had a colonoscopy/egd which did not report a specific etiology. He has one bm/day. He denies any abdominal pain. He denies noticing any changes his diet or supplements that he is taking. Lipase, celiac testing negative. He does not have electrolyte abnormalities. ESR within normal     Lung Nodule    10.5mm nodule found on CT in the right lower lobe. He denies smoking or vaping. Follow up CT has been ordered already      Prediabetes    A1c was 5.8% in September of 2024     Other Fatigue    Starting in August. He is sleeping 8-9 hours/day. He has been on FMLA since mid-September. He feels that his fatigue is debilitating on some days. He also reports body aches. His rheumatological work up has been negative. He does have lower back pain which has been worse overall lately. He had a lumbar MRI which reported bone spur at L5-S1.     Current Moderate Episode of Major Depressive Disorder Without Prior Episode (Hcc) (Resolved)   Decreased Appetite (Resolved)   Hospital Discharge Follow-Up (Resolved)       Current Outpatient Medications Ordered in Epic   Medication Sig Dispense Refill    guanFACINE ER (INTUNIV) 1 MG TABLET SR 24 HR tablet Take 1 Tablet by mouth every day. 30 Tablet 0    multivitamin Tab Take 1 Tablet by mouth every day.      neomycin-polymixin-dexamethasone (MAXITROL) 3.5-66656-3.1 Ointment ophthalmic ointment PLEASE SEE ATTACHED FOR DETAILED DIRECTIONS (Patient not taking: Reported on 12/3/2024)       No current [facility]-ordered facility-administered medications on file.           ROS:  Review of Systems   Constitutional:  Positive for malaise/fatigue.       Objective:     Exam:  /72 (BP Location:  "Right arm, Patient Position: Sitting, BP Cuff Size: Adult)   Pulse 62   Temp 36.8 °C (98.2 °F) (Temporal)   Resp 16   Ht 1.854 m (6' 1\")   Wt 101 kg (223 lb 5.2 oz)   SpO2 98%   BMI 29.46 kg/m²  Body mass index is 29.46 kg/m².    Physical Exam  Constitutional:       General: He is not in acute distress.     Appearance: He is not ill-appearing.   Pulmonary:      Effort: Pulmonary effort is normal.   Neurological:      Mental Status: He is alert.   Psychiatric:         Mood and Affect: Mood normal.         Behavior: Behavior normal.         Thought Content: Thought content normal.         Judgment: Judgment normal.                   Assessment & Plan:     Problem List Items Addressed This Visit       Abnormal stools    Relevant Orders    STOOL PH    POTASSIUM (RANDOM) FECES    SODIUM (RANDOM) FECES    NEUTRAL FAT    OSMOLALITY STOOL    Esophagitis    Lung nodule     Chronic  -pending f/u CT          Other fatigue     Subacute unclear etiology   -patient back pain could be contributing to his fatigue overall, patient told to make follow up [facility]AN[facility] for further management of his lower back which has an appointment on December 10   -he does report having covid in the past year and may be having long covid symptoms. There are some reports from [place] with signficant improvements in symptoms with guanfacine 1mg and NAC 600mg. This was discussed with patient as well. Patient does not have asthma            Relevant Medications    guanFACINE ER (INTUNIV) 1 MG TABLET SR 24 HR tablet    Prediabetes     Chronic  -recheck A1c in the future          Relevant Orders    HEMOGLOBIN A1C       I spent a total of 62 minutes with record review, exam, communication with the patient, placing orders, and documentation of this encounter.            Please note that this dictation was created using voice recognition software. I have made every reasonable attempt to correct obvious errors, but I expect that there are errors of grammar and " possibly content that I did not discover before finalizing the note.

## 2024-12-03 NOTE — ASSESSMENT & PLAN NOTE
Subacute unclear etiology   -patient back pain could be contributing to his fatigue overall, patient told to make follow up [facility]AN[facility] for further management of his lower back which has an appointment on December 10   -he does report having covid in the past year and may be having long covid symptoms. There are some reports from [place] with signficant improvements in symptoms with guanfacine 1mg and NAC 600mg. This was discussed with patient as well. Patient does not have asthma

## 2024-12-04 ENCOUNTER — HOSPITAL ENCOUNTER (OUTPATIENT)
Dept: LAB | Facility: MEDICAL CENTER | Age: 34
End: 2024-12-04
Attending: STUDENT IN AN ORGANIZED HEALTH CARE EDUCATION/TRAINING PROGRAM
Payer: COMMERCIAL

## 2024-12-04 DIAGNOSIS — R73.03 PREDIABETES: ICD-10-CM

## 2024-12-04 DIAGNOSIS — R19.5 ABNORMAL STOOLS: ICD-10-CM

## 2024-12-04 LAB
EST. AVERAGE GLUCOSE BLD GHB EST-MCNC: 111 MG/DL
HBA1C MFR BLD: 5.5 % (ref 4–5.6)

## 2024-12-04 PROCEDURE — 83986 ASSAY PH BODY FLUID NOS: CPT

## 2024-12-04 PROCEDURE — 84999 UNLISTED CHEMISTRY PROCEDURE: CPT | Mod: 91

## 2024-12-04 PROCEDURE — 36415 COLL VENOUS BLD VENIPUNCTURE: CPT

## 2024-12-04 PROCEDURE — 82705 FATS/LIPIDS FECES QUAL: CPT

## 2024-12-04 PROCEDURE — 84302 ASSAY OF SWEAT SODIUM: CPT

## 2024-12-04 PROCEDURE — 83036 HEMOGLOBIN GLYCOSYLATED A1C: CPT

## 2024-12-06 LAB
FAT STL QL: NORMAL
MISCELLANEOUS LAB RESULT MISCLAB: NORMAL
NEUTRAL FAT STL QL: NORMAL
OSMOLALITY STL: NORMAL MOSM/KG (ref 280–303)

## 2024-12-09 DIAGNOSIS — R19.5 ABNORMAL STOOLS: ICD-10-CM

## 2024-12-10 ENCOUNTER — HOSPITAL ENCOUNTER (OUTPATIENT)
Facility: MEDICAL CENTER | Age: 34
End: 2024-12-10
Attending: STUDENT IN AN ORGANIZED HEALTH CARE EDUCATION/TRAINING PROGRAM
Payer: COMMERCIAL

## 2024-12-10 DIAGNOSIS — R19.5 ABNORMAL STOOLS: ICD-10-CM

## 2024-12-10 LAB
POTASSIUM STL-SCNC: 132 MMOL/L
SODIUM STL-SCNC: <20 MMOL/L

## 2024-12-10 PROCEDURE — 84999 UNLISTED CHEMISTRY PROCEDURE: CPT

## 2024-12-13 LAB — OSMOLALITY STL: NORMAL MOSM/KG (ref 280–303)

## 2024-12-14 ENCOUNTER — HOSPITAL ENCOUNTER (OUTPATIENT)
Dept: RADIOLOGY | Facility: MEDICAL CENTER | Age: 34
End: 2024-12-14
Attending: NURSE PRACTITIONER
Payer: COMMERCIAL

## 2024-12-14 DIAGNOSIS — R42 DIZZINESS: ICD-10-CM

## 2024-12-14 DIAGNOSIS — H53.8 BLURRY VISION, BILATERAL: ICD-10-CM

## 2024-12-14 PROCEDURE — 70553 MRI BRAIN STEM W/O & W/DYE: CPT

## 2024-12-14 PROCEDURE — A9579 GAD-BASE MR CONTRAST NOS,1ML: HCPCS | Mod: JZ

## 2024-12-14 PROCEDURE — 700117 HCHG RX CONTRAST REV CODE 255: Mod: JZ

## 2024-12-14 RX ADMIN — GADOTERIDOL 20 ML: 279.3 INJECTION, SOLUTION INTRAVENOUS at 13:44

## 2024-12-23 ENCOUNTER — HOSPITAL ENCOUNTER (OUTPATIENT)
Dept: RADIOLOGY | Facility: MEDICAL CENTER | Age: 34
End: 2024-12-23
Attending: NURSE PRACTITIONER
Payer: COMMERCIAL

## 2024-12-23 DIAGNOSIS — R91.1 RIGHT LOWER LOBE PULMONARY NODULE: ICD-10-CM

## 2024-12-23 PROCEDURE — 71250 CT THORAX DX C-: CPT

## 2024-12-25 DIAGNOSIS — R53.83 OTHER FATIGUE: ICD-10-CM

## 2024-12-26 RX ORDER — GUANFACINE 1 MG/1
1 TABLET, EXTENDED RELEASE ORAL DAILY
Qty: 90 TABLET | Refills: 1 | Status: SHIPPED | OUTPATIENT
Start: 2024-12-26

## 2024-12-26 NOTE — TELEPHONE ENCOUNTER
Received request via: Pharmacy    Was the patient seen in the last year in this department? Yes    Does the patient have an active prescription (recently filled or refills available) for medication(s) requested? No    Pharmacy Name: [facility]     Does the patient have alf Plus and need 100-day supply? (This applies to ALL medications) Patient does not have SCP

## 2024-12-31 ENCOUNTER — HOSPITAL ENCOUNTER (EMERGENCY)
Facility: MEDICAL CENTER | Age: 34
End: 2024-12-31
Attending: EMERGENCY MEDICINE
Payer: COMMERCIAL

## 2024-12-31 VITALS
BODY MASS INDEX: 30.01 KG/M2 | RESPIRATION RATE: 18 BRPM | DIASTOLIC BLOOD PRESSURE: 84 MMHG | HEART RATE: 69 BPM | OXYGEN SATURATION: 96 % | TEMPERATURE: 97.4 F | HEIGHT: 73 IN | SYSTOLIC BLOOD PRESSURE: 146 MMHG | WEIGHT: 226.41 LBS

## 2024-12-31 DIAGNOSIS — R03.0 ELEVATED BLOOD PRESSURE READING: ICD-10-CM

## 2024-12-31 DIAGNOSIS — R53.81 MALAISE: ICD-10-CM

## 2024-12-31 DIAGNOSIS — R42 LIGHTHEADEDNESS: ICD-10-CM

## 2024-12-31 LAB
AMPHET UR QL SCN: NEGATIVE
ANION GAP SERPL CALC-SCNC: 13 MMOL/L (ref 7–16)
BARBITURATES UR QL SCN: NEGATIVE
BASOPHILS # BLD AUTO: 0.5 % (ref 0–1.8)
BASOPHILS # BLD: 0.03 K/UL (ref 0–0.12)
BENZODIAZ UR QL SCN: NEGATIVE
BUN SERPL-MCNC: 12 MG/DL (ref 8–22)
BZE UR QL SCN: NEGATIVE
CALCIUM SERPL-MCNC: 9.6 MG/DL (ref 8.5–10.5)
CANNABINOIDS UR QL SCN: NEGATIVE
CHLORIDE SERPL-SCNC: 103 MMOL/L (ref 96–112)
CO2 SERPL-SCNC: 24 MMOL/L (ref 20–33)
CREAT SERPL-MCNC: 1.01 MG/DL (ref 0.5–1.4)
EKG IMPRESSION: NORMAL
EOSINOPHIL # BLD AUTO: 0.03 K/UL (ref 0–0.51)
EOSINOPHIL NFR BLD: 0.5 % (ref 0–6.9)
ERYTHROCYTE [DISTWIDTH] IN BLOOD BY AUTOMATED COUNT: 39.5 FL (ref 35.9–50)
FENTANYL UR QL: NEGATIVE
GFR SERPLBLD CREATININE-BSD FMLA CKD-EPI: 100 ML/MIN/1.73 M 2
GLUCOSE SERPL-MCNC: 97 MG/DL (ref 65–99)
HCT VFR BLD AUTO: 47.6 % (ref 42–52)
HGB BLD-MCNC: 16.2 G/DL (ref 14–18)
IMM GRANULOCYTES # BLD AUTO: 0.04 K/UL (ref 0–0.11)
IMM GRANULOCYTES NFR BLD AUTO: 0.7 % (ref 0–0.9)
LYMPHOCYTES # BLD AUTO: 2.17 K/UL (ref 1–4.8)
LYMPHOCYTES NFR BLD: 37.7 % (ref 22–41)
MCH RBC QN AUTO: 30.7 PG (ref 27–33)
MCHC RBC AUTO-ENTMCNC: 34 G/DL (ref 32.3–36.5)
MCV RBC AUTO: 90.3 FL (ref 81.4–97.8)
METHADONE UR QL SCN: NEGATIVE
MONOCYTES # BLD AUTO: 0.37 K/UL (ref 0–0.85)
MONOCYTES NFR BLD AUTO: 6.4 % (ref 0–13.4)
NEUTROPHILS # BLD AUTO: 3.11 K/UL (ref 1.82–7.42)
NEUTROPHILS NFR BLD: 54.2 % (ref 44–72)
NRBC # BLD AUTO: 0 K/UL
NRBC BLD-RTO: 0 /100 WBC (ref 0–0.2)
OPIATES UR QL SCN: NEGATIVE
OXYCODONE UR QL SCN: NEGATIVE
PCP UR QL SCN: NEGATIVE
PLATELET # BLD AUTO: 168 K/UL (ref 164–446)
PMV BLD AUTO: 11.5 FL (ref 9–12.9)
POTASSIUM SERPL-SCNC: 4.2 MMOL/L (ref 3.6–5.5)
PROPOXYPH UR QL SCN: NEGATIVE
RBC # BLD AUTO: 5.27 M/UL (ref 4.7–6.1)
SODIUM SERPL-SCNC: 140 MMOL/L (ref 135–145)
WBC # BLD AUTO: 5.8 K/UL (ref 4.8–10.8)

## 2024-12-31 PROCEDURE — 80307 DRUG TEST PRSMV CHEM ANLYZR: CPT

## 2024-12-31 PROCEDURE — 85025 COMPLETE CBC W/AUTO DIFF WBC: CPT

## 2024-12-31 PROCEDURE — 80048 BASIC METABOLIC PNL TOTAL CA: CPT

## 2024-12-31 PROCEDURE — 93005 ELECTROCARDIOGRAM TRACING: CPT | Mod: TC

## 2024-12-31 PROCEDURE — 36415 COLL VENOUS BLD VENIPUNCTURE: CPT

## 2024-12-31 PROCEDURE — 93005 ELECTROCARDIOGRAM TRACING: CPT | Mod: TC | Performed by: EMERGENCY MEDICINE

## 2024-12-31 PROCEDURE — 99283 EMERGENCY DEPT VISIT LOW MDM: CPT

## 2024-12-31 ASSESSMENT — FIBROSIS 4 INDEX: FIB4 SCORE: 0.71

## 2024-12-31 NOTE — ED TRIAGE NOTES
Pt ambulated to triage with   Chief Complaint   Patient presents with    Lightheadedness     For the last few months, having lightheadness/dizziness/generalized weakness with c/o headache today - took tylenol.  Pt reports that he has been workup since September and following with PCP.  Neuro intact.         Pt Informed regarding triage process and verbalized understanding to inform triage tech or RN for any changes in condition. Placed in lobby.

## 2024-12-31 NOTE — ED PROVIDER NOTES
"  ER Provider Note    Scribed for Ankush Wilburn M.D. by Maddy Castellanos. 12/31/2024   2:36 PM    Primary Care Provider: Josiah Che D.O.    CHIEF COMPLAINT  Chief Complaint   Patient presents with    Lightheadedness     For the last few months, having lightheadness/dizziness/generalized weakness with c/o headache today - took tylenol.  Pt reports that he has been workup since September and following with PCP.  Neuro intact.       EXTERNAL RECORDS REVIEWED  Outpatient Notes The patient was seen at this emergency department numerous times for a variety of similar complaints.   HPI/ROS  LIMITATION TO HISTORY   Select: : None  OUTSIDE HISTORIAN(S):  None    Waldemar Ohara is a 34 y.o. male who presents to the ED for evaluation of lightheadedness worsening a week ago. The patient notes he has experienced these symptoms before, however it has worsened this past week. He states it feels like \"he has been drinking or is in an altered mental state\". The patient notes with the lightheadedness he also feels dizzy. He states he has associated weakness as well. The patient mentions he has been seeing his primary care physician frequently to evaluate his symptoms. He notes he has been sleeping normally, however he has a new baby at home which sometimes disturbs his sleep. He states his stress is moderate, however when he first was experiencing these symptoms it increased his stress levels. He notes he had at CAT scan of his head a couple weeks ago which came back as normal. He denies being on any medications. He denies any known medical history. No alleviating or exacerbating factors were noted.    PAST MEDICAL HISTORY  Past Medical History:   Diagnosis Date    COVID        SURGICAL HISTORY  No pertinent past surgical history noted.    FAMILY HISTORY  Family History   Problem Relation Age of Onset    No Known Problems Mother     Hypertension Father     Hyperlipidemia Father     Hypertension Brother     Obesity " "Brother     No Known Problems Maternal Grandmother     No Known Problems Maternal Grandfather     No Known Problems Paternal Grandmother     No Known Problems Paternal Grandfather        SOCIAL HISTORY   reports that he has never smoked. He has never used smokeless tobacco. He reports that he does not currently use alcohol. He reports that he does not use drugs.    CURRENT MEDICATIONS  Previous Medications    GUANFACINE ER (INTUNIV) 1 MG TABLET SR 24 HR TABLET    TAKE 1 TABLET BY MOUTH EVERY DAY    MULTIVITAMIN TAB    Take 1 Tablet by mouth every day.    NEOMYCIN-POLYMIXIN-DEXAMETHASONE (MAXITROL) 3.5-86455-8.1 OINTMENT OPHTHALMIC OINTMENT    PLEASE SEE ATTACHED FOR DETAILED DIRECTIONS       ALLERGIES  No Known Allergies     PHYSICAL EXAM  BP (!) 152/101   Pulse 65   Temp 36.2 °C (97.1 °F) (Temporal)   Resp 16   Ht 1.854 m (6' 1\")   Wt 103 kg (226 lb 6.6 oz)   SpO2 100%   BMI 29.87 kg/m²    Nursing note and vitals reviewed.  Constitutional: Well-developed and well-nourished. No distress.   HENT: Head is normocephalic and atraumatic. Oropharynx is clear and moist without exudate or erythema.   Eyes: Pupils are equal, round, and reactive to light. Conjunctiva are normal.   Cardiovascular: Normal rate and regular rhythm. No murmur heard. Normal radial pulses.   Pulmonary/Chest: Breath sounds normal. No wheezes or rales. No chest wall tenderness.   Abdominal: Soft and non-tender. No distention   Musculoskeletal: Extremities exhibit normal range of motion without edema or tenderness. No calf tenderness or palpable cords.   Neurological: Awake, alert and oriented to person, place, and time. No focal deficits noted.  Skin: Skin is warm and dry. No rash.   Psychiatric: Normal mood and affect. Appropriate for clinical situation    DIAGNOSTIC STUDIES    Labs:   Results for orders placed or performed during the hospital encounter of 12/31/24   URINE DRUG SCREEN    Collection Time: 12/31/24  2:37 PM   Result Value Ref " Range    Amphetamines Urine Negative Negative    Barbiturates Negative Negative    Benzodiazepines Negative Negative    Cocaine Metabolite Negative Negative    Fentanyl, Urine Negative Negative    Methadone Negative Negative    Opiates Negative Negative    Oxycodone Negative Negative    Phencyclidine -Pcp Negative Negative    Propoxyphene Negative Negative    Cannabinoid Metab Negative Negative   EKG    Collection Time: 24  2:37 PM   Result Value Ref Range    Report       [facility] Emergency Dept.    Test Date:  2024  Pt Name:    MAYO BOWSER       Department: ER  MRN:        [id number]                      Room:  Gender:     Male                         Technician: [id number]  :        1990                   Requested By:ER TRIAGE PROTOCOL  Order #:    [id number]                    Reading MD: MICHELLE WANG MD    Measurements  Intervals                                Axis  Rate:       58                           P:          68  VT:         141                          QRS:        102  QRSD:       97                           T:          8  QT:         409  QTc:        402    Interpretive Statements  Sinus bradycardia  Right axis deviation  Low voltage, precordial leads  Compared to ECG 11/10/2024 22:24:23  Right-axis deviation now present  Low QRS voltage now present  Sinus rhythm no longer present  ST (T wave) deviation no longer present  T-wave abnormality no longer present  Electronically Signed On 2024  14:37:58 PST by MICHELLE WANG MD     CBC WITH DIFFERENTIAL    Collection Time: 24  3:31 PM   Result Value Ref Range    WBC 5.8 4.8 - 10.8 K/uL    RBC 5.27 4.70 - 6.10 M/uL    Hemoglobin 16.2 14.0 - 18.0 g/dL    Hematocrit 47.6 42.0 - 52.0 %    MCV 90.3 81.4 - 97.8 fL    MCH 30.7 27.0 - 33.0 pg    MCHC 34.0 32.3 - 36.5 g/dL    RDW 39.5 35.9 - 50.0 fL    Platelet Count 168 164 - 446 K/uL    MPV 11.5 9.0 - 12.9 fL    Neutrophils-Polys 54.20 44.00 - 72.00  %    Lymphocytes 37.70 22.00 - 41.00 %    Monocytes 6.40 0.00 - 13.40 %    Eosinophils 0.50 0.00 - 6.90 %    Basophils 0.50 0.00 - 1.80 %    Immature Granulocytes 0.70 0.00 - 0.90 %    Nucleated RBC 0.00 0.00 - 0.20 /100 WBC    Neutrophils (Absolute) 3.11 1.82 - 7.42 K/uL    Lymphs (Absolute) 2.17 1.00 - 4.80 K/uL    Monos (Absolute) 0.37 0.00 - 0.85 K/uL    Eos (Absolute) 0.03 0.00 - 0.51 K/uL    Baso (Absolute) 0.03 0.00 - 0.12 K/uL    Immature Granulocytes (abs) 0.04 0.00 - 0.11 K/uL    NRBC (Absolute) 0.00 K/uL   BASIC METABOLIC PANEL    Collection Time: 24  3:31 PM   Result Value Ref Range    Sodium 140 135 - 145 mmol/L    Potassium 4.2 3.6 - 5.5 mmol/L    Chloride 103 96 - 112 mmol/L    Co2 24 20 - 33 mmol/L    Glucose 97 65 - 99 mg/dL    Bun 12 8 - 22 mg/dL    Creatinine 1.01 0.50 - 1.40 mg/dL    Calcium 9.6 8.5 - 10.5 mg/dL    Anion Gap 13.0 7.0 - 16.0   ESTIMATED GFR    Collection Time: 24  3:31 PM   Result Value Ref Range    GFR (CKD-EPI) 100 >60 mL/min/1.73 m 2       EKG:   I have independently interpreted this EKG as detailed above.  Results for orders placed or performed during the hospital encounter of 24   EKG   Result Value Ref Range    Report       [facility]    Test Date:  2024  Pt Name:    MAYO BOWSER       Department: ER  MRN:        [id number]                      Room:  Gender:     Male                         Technician: [id number]  :        1990                   Requested By:ER TRIAGE PROTOCOL  Order #:    [id number]                    Reading MD: MICHELLE WANG MD    Measurements  Intervals                                Axis  Rate:       58                           P:          68  CT:         141                          QRS:        102  QRSD:       97                           T:          8  QT:         409  QTc:        402    Interpretive Statements  Sinus bradycardia  Right axis deviation  Low voltage, precordial  "leads  Compared to ECG 11/10/2024 22:24:23  Right-axis deviation now present  Low QRS voltage now present  Sinus rhythm no longer present  ST (T wave) deviation no longer present  T-wave abnormality no longer present  Electronically Signed On 12-  14:37:58 PST by MICHELLE WANG MD       INITIAL ASSESSMENT AND PLAN    2:41 PM - Patient was evaluated at bedside. Ordered for urine drug screening, CBC w diff, BMP, IV saline lock, and EKG  to evaluate. Patient verbalizes understanding and support with my plan of care.  Differential diagnoses include but not limited to: anxiety, psycho sematic symptoms, anemia, electrolyte abnormality, ad arhythmia.        Laboratory studies are unremarkable.  No significant electrolyte derangement.  No renal dysfunction.  Drug screen is negative.    The patient has been evaluated on numerous occasions.  At this point I feel the most likely diagnosis is psychosomatic symptoms/anxiety as the etiology of the patient's symptoms.  No \"red flags\".  Overall emergency department and overall workup reassuring.     HYDRATION: Based on the patient's presentation of Dehydration the patient was given IV fluids. IV Hydration was used because oral hydration was not adequate alone. Upon recheck following hydration, the patient was feeling improved.    DISPOSITION AND DISCUSSIONS    I have discussed management of the patient with the following physicians and DRE's:  None    Discussion of management with other \[facility] [place] [facility]\"" or appropriate source(s): None     The patient will return for new or worsening symptoms and is stable at the time of discharge.    DISPOSITION:  Patient will be discharged home in stable condition.    FOLLOW UP:  Josiah Che D.O.  [address]  [place] [address] [phone]    Schedule an appointment as soon as possible for a visit       [facility], Emergency Dept  [address] [address] [phone]    If symptoms " worsen    OUTPATIENT MEDICATIONS:  Discharge Medication List as of 12/31/2024  4:28 PM        FINAL DIAGNOSIS  1. Lightheadedness    2. Malaise    3. Elevated blood pressure reading       IMaddy (Nathan), am scribing for, and in the presence of, Ankush Wilburn M.D..    Electronically signed by: Maddy Castellanos (Leoibe), 12/31/2024    IAnkush M.D. personally performed the services described in this documentation, as scribed by Maddy Castellanos in my presence, and it is both accurate and complete.      The note accurately reflects work and decisions made by me.  Ankush Wilburn M.D.  12/31/2024  8:39 PM

## 2025-01-01 NOTE — ED NOTES
Reviewed discharge instructions, pt verbalized understanding of follow up with PCP. All Rx/OTC medications reviewed with pt who voices understanding of medication use. Pt encouraged to return to the ED for dizziness, SOB, CP, or any other new/concerning symptoms.

## 2025-01-07 ENCOUNTER — OFFICE VISIT (OUTPATIENT)
Dept: MEDICAL GROUP | Facility: LAB | Age: 35
End: 2025-01-07
Payer: COMMERCIAL

## 2025-01-07 VITALS
HEIGHT: 73 IN | WEIGHT: 226.4 LBS | OXYGEN SATURATION: 100 % | BODY MASS INDEX: 30 KG/M2 | RESPIRATION RATE: 16 BRPM | TEMPERATURE: 97.3 F | DIASTOLIC BLOOD PRESSURE: 80 MMHG | HEART RATE: 54 BPM | SYSTOLIC BLOOD PRESSURE: 112 MMHG

## 2025-01-07 DIAGNOSIS — R91.1 LUNG NODULE: ICD-10-CM

## 2025-01-07 DIAGNOSIS — R94.31 ABNORMAL EKG: ICD-10-CM

## 2025-01-07 DIAGNOSIS — K92.1 BLOOD IN STOOL: ICD-10-CM

## 2025-01-07 DIAGNOSIS — R53.83 OTHER FATIGUE: ICD-10-CM

## 2025-01-07 DIAGNOSIS — K12.0 APHTHOUS ULCER: ICD-10-CM

## 2025-01-07 PROBLEM — R73.03 PREDIABETES: Status: RESOLVED | Noted: 2024-09-26 | Resolved: 2025-01-07

## 2025-01-07 PROCEDURE — 3074F SYST BP LT 130 MM HG: CPT | Performed by: STUDENT IN AN ORGANIZED HEALTH CARE EDUCATION/TRAINING PROGRAM

## 2025-01-07 PROCEDURE — 99214 OFFICE O/P EST MOD 30 MIN: CPT | Performed by: STUDENT IN AN ORGANIZED HEALTH CARE EDUCATION/TRAINING PROGRAM

## 2025-01-07 PROCEDURE — 3079F DIAST BP 80-89 MM HG: CPT | Performed by: STUDENT IN AN ORGANIZED HEALTH CARE EDUCATION/TRAINING PROGRAM

## 2025-01-07 ASSESSMENT — PATIENT HEALTH QUESTIONNAIRE - PHQ9: CLINICAL INTERPRETATION OF PHQ2 SCORE: 0

## 2025-01-07 ASSESSMENT — FIBROSIS 4 INDEX: FIB4 SCORE: 0.84

## 2025-01-07 NOTE — PROGRESS NOTES
"Subjective:     CC:   Chief Complaint   Patient presents with    Follow-Up    Fatigue        HPI:       Problem   Aphthous Ulcer   Lung Nodule    10.5mm nodule found on CT in the right lower lobe. He denies smoking or vaping. Follow up CT has been ordered already     1/7/25  -he completed follow up CT in December which did not report change in nodule      Other Fatigue    Starting in August. He is sleeping 8-9 hours/day. He has been on FMLA since mid-September. He feels that his fatigue is debilitating on some days. He also reports body aches. His rheumatological work up has been negative. He does have lower back pain which has been worse overall lately. He had a lumbar MRI which reported bone spur at L5-S1.    1/7/25  -patient was unable to tolerate guanfacine   -he some mild improvement overall   -patient reports having blood in his stool recently      Prediabetes (Resolved)    A1c was 5.8% in September of 2024         Current Outpatient Medications Ordered in Epic   Medication Sig Dispense Refill    multivitamin Tab Take 1 Tablet by mouth every day.      neomycin-polymixin-dexamethasone (MAXITROL) 3.5-75419-6.1 Ointment ophthalmic ointment PLEASE SEE ATTACHED FOR DETAILED DIRECTIONS (Patient not taking: Reported on 12/3/2024)       No current [facility]-ordered facility-administered medications on file.           ROS:  ROS    Objective:     Exam:  /80 (BP Location: Right arm, Patient Position: Sitting, BP Cuff Size: Adult)   Pulse (!) 54   Temp 36.3 °C (97.3 °F) (Temporal)   Resp 16   Ht 1.854 m (6' 1\")   Wt 103 kg (226 lb 6.4 oz)   SpO2 100%   BMI 29.87 kg/m²  Body mass index is 29.87 kg/m².    Physical Exam              Assessment & Plan:     Problem List Items Addressed This Visit       Aphthous ulcer     -recommended orajel OTC oral rinse          Lung nodule     Chronic  -repeat CT ordered for 1 year          Relevant Orders    CT-CHEST (THORAX) W/O    Other fatigue     Chronic  -will order stool " occult if positive pt will complete CT A/P         Relevant Orders    NM-CARDIAC TREADMILL ONLY-NO IMAGING    OCCULT BLOOD STOOL     Other Visit Diagnoses       Abnormal EKG        Relevant Orders    NM-CARDIAC TREADMILL ONLY-NO IMAGING    Blood in stool        Relevant Orders    CT-ABDOMEN-PELVIS WITH          3+ tests reviewed     Annual in 6 months       Please note that this dictation was created using voice recognition software. I have made every reasonable attempt to correct obvious errors, but I expect that there are errors of grammar and possibly content that I did not discover before finalizing the note.

## 2025-01-10 ENCOUNTER — APPOINTMENT (OUTPATIENT)
Dept: LAB | Facility: MEDICAL CENTER | Age: 35
End: 2025-01-10
Attending: NURSE PRACTITIONER
Payer: COMMERCIAL

## 2025-01-16 ENCOUNTER — HOSPITAL ENCOUNTER (OUTPATIENT)
Dept: RADIOLOGY | Facility: MEDICAL CENTER | Age: 35
End: 2025-01-16
Attending: STUDENT IN AN ORGANIZED HEALTH CARE EDUCATION/TRAINING PROGRAM
Payer: COMMERCIAL

## 2025-01-16 DIAGNOSIS — R94.31 ABNORMAL EKG: ICD-10-CM

## 2025-01-16 DIAGNOSIS — R53.83 OTHER FATIGUE: ICD-10-CM

## 2025-01-16 PROCEDURE — 93017 CV STRESS TEST TRACING ONLY: CPT | Mod: TC

## 2025-01-16 PROCEDURE — 93018 CV STRESS TEST I&R ONLY: CPT | Performed by: INTERNAL MEDICINE

## 2025-01-22 ENCOUNTER — HOSPITAL ENCOUNTER (OUTPATIENT)
Dept: LAB | Facility: MEDICAL CENTER | Age: 35
End: 2025-01-22
Attending: NURSE PRACTITIONER
Payer: COMMERCIAL

## 2025-01-22 DIAGNOSIS — R63.0 DECREASED APPETITE: ICD-10-CM

## 2025-01-22 PROCEDURE — 83013 H PYLORI (C-13) BREATH: CPT

## 2025-01-23 LAB — UREA BREATH TEST QL: NEGATIVE

## 2025-01-24 ENCOUNTER — HOSPITAL ENCOUNTER (EMERGENCY)
Facility: MEDICAL CENTER | Age: 35
End: 2025-01-24
Attending: EMERGENCY MEDICINE
Payer: COMMERCIAL

## 2025-01-24 ENCOUNTER — APPOINTMENT (OUTPATIENT)
Dept: RADIOLOGY | Facility: MEDICAL CENTER | Age: 35
End: 2025-01-24
Attending: EMERGENCY MEDICINE
Payer: COMMERCIAL

## 2025-01-24 VITALS
DIASTOLIC BLOOD PRESSURE: 76 MMHG | BODY MASS INDEX: 29.92 KG/M2 | HEIGHT: 73 IN | HEART RATE: 64 BPM | RESPIRATION RATE: 17 BRPM | OXYGEN SATURATION: 96 % | WEIGHT: 225.75 LBS | TEMPERATURE: 97.7 F | SYSTOLIC BLOOD PRESSURE: 144 MMHG

## 2025-01-24 DIAGNOSIS — R07.9 CHEST PAIN, UNSPECIFIED TYPE: ICD-10-CM

## 2025-01-24 LAB
ALBUMIN SERPL BCP-MCNC: 4.6 G/DL (ref 3.2–4.9)
ALBUMIN/GLOB SERPL: 1.5 G/DL
ALP SERPL-CCNC: 74 U/L (ref 30–99)
ALT SERPL-CCNC: 12 U/L (ref 2–50)
ANION GAP SERPL CALC-SCNC: 13 MMOL/L (ref 7–16)
AST SERPL-CCNC: 20 U/L (ref 12–45)
BASOPHILS # BLD AUTO: 0.5 % (ref 0–1.8)
BASOPHILS # BLD: 0.03 K/UL (ref 0–0.12)
BILIRUB SERPL-MCNC: 0.6 MG/DL (ref 0.1–1.5)
BUN SERPL-MCNC: 16 MG/DL (ref 8–22)
CALCIUM ALBUM COR SERPL-MCNC: 9.5 MG/DL (ref 8.5–10.5)
CALCIUM SERPL-MCNC: 10 MG/DL (ref 8.5–10.5)
CHLORIDE SERPL-SCNC: 103 MMOL/L (ref 96–112)
CO2 SERPL-SCNC: 21 MMOL/L (ref 20–33)
CREAT SERPL-MCNC: 1.17 MG/DL (ref 0.5–1.4)
EKG IMPRESSION: NORMAL
EOSINOPHIL # BLD AUTO: 0.08 K/UL (ref 0–0.51)
EOSINOPHIL NFR BLD: 1.3 % (ref 0–6.9)
ERYTHROCYTE [DISTWIDTH] IN BLOOD BY AUTOMATED COUNT: 39.5 FL (ref 35.9–50)
GFR SERPLBLD CREATININE-BSD FMLA CKD-EPI: 84 ML/MIN/1.73 M 2
GLOBULIN SER CALC-MCNC: 3.1 G/DL (ref 1.9–3.5)
GLUCOSE SERPL-MCNC: 107 MG/DL (ref 65–99)
HCT VFR BLD AUTO: 47.5 % (ref 42–52)
HGB BLD-MCNC: 16 G/DL (ref 14–18)
HOLDING TUBE BB 8507: NORMAL
IMM GRANULOCYTES # BLD AUTO: 0.03 K/UL (ref 0–0.11)
IMM GRANULOCYTES NFR BLD AUTO: 0.5 % (ref 0–0.9)
LYMPHOCYTES # BLD AUTO: 2.47 K/UL (ref 1–4.8)
LYMPHOCYTES NFR BLD: 39.3 % (ref 22–41)
MCH RBC QN AUTO: 30.5 PG (ref 27–33)
MCHC RBC AUTO-ENTMCNC: 33.7 G/DL (ref 32.3–36.5)
MCV RBC AUTO: 90.6 FL (ref 81.4–97.8)
MONOCYTES # BLD AUTO: 0.39 K/UL (ref 0–0.85)
MONOCYTES NFR BLD AUTO: 6.2 % (ref 0–13.4)
NEUTROPHILS # BLD AUTO: 3.28 K/UL (ref 1.82–7.42)
NEUTROPHILS NFR BLD: 52.2 % (ref 44–72)
NRBC # BLD AUTO: 0 K/UL
NRBC BLD-RTO: 0 /100 WBC (ref 0–0.2)
PLATELET # BLD AUTO: 179 K/UL (ref 164–446)
PMV BLD AUTO: 11.6 FL (ref 9–12.9)
POTASSIUM SERPL-SCNC: 4 MMOL/L (ref 3.6–5.5)
PROT SERPL-MCNC: 7.7 G/DL (ref 6–8.2)
RBC # BLD AUTO: 5.24 M/UL (ref 4.7–6.1)
SODIUM SERPL-SCNC: 137 MMOL/L (ref 135–145)
TROPONIN T SERPL-MCNC: <6 NG/L (ref 6–19)
WBC # BLD AUTO: 6.3 K/UL (ref 4.8–10.8)

## 2025-01-24 PROCEDURE — 99283 EMERGENCY DEPT VISIT LOW MDM: CPT

## 2025-01-24 PROCEDURE — 71045 X-RAY EXAM CHEST 1 VIEW: CPT

## 2025-01-24 PROCEDURE — 84484 ASSAY OF TROPONIN QUANT: CPT

## 2025-01-24 PROCEDURE — 85025 COMPLETE CBC W/AUTO DIFF WBC: CPT

## 2025-01-24 PROCEDURE — 93005 ELECTROCARDIOGRAM TRACING: CPT | Mod: TC | Performed by: EMERGENCY MEDICINE

## 2025-01-24 PROCEDURE — 80053 COMPREHEN METABOLIC PANEL: CPT

## 2025-01-24 PROCEDURE — 36415 COLL VENOUS BLD VENIPUNCTURE: CPT

## 2025-01-24 PROCEDURE — 93005 ELECTROCARDIOGRAM TRACING: CPT | Mod: TC

## 2025-01-24 ASSESSMENT — FIBROSIS 4 INDEX: FIB4 SCORE: 0.84

## 2025-01-25 ENCOUNTER — APPOINTMENT (OUTPATIENT)
Dept: RADIOLOGY | Facility: MEDICAL CENTER | Age: 35
End: 2025-01-25
Attending: ORTHOPAEDIC SURGERY
Payer: COMMERCIAL

## 2025-01-25 ASSESSMENT — HEART SCORE
ECG: NORMAL
RISK FACTORS: NO KNOWN RISK FACTORS
HEART SCORE: 0
TROPONIN: LESS THAN OR EQUAL TO NORMAL LIMIT
AGE: <45
HISTORY: SLIGHTLY SUSPICIOUS

## 2025-01-25 NOTE — ED NOTES
Pt from lobby with steady gait. Pt changed into gown, and connected to monitor. Agree with triage note. Chart up for ERP. Call light within reach.

## 2025-01-25 NOTE — DISCHARGE INSTRUCTIONS
Today your blood tests, EKG and chest x-ray were very reassuring against any serious heart or lung problems of your chest pain.  Follow-up with your GI doctor as scheduled.  Come back to the ER if you think your symptoms are getting worse.

## 2025-01-25 NOTE — ED TRIAGE NOTES
Chief Complaint   Patient presents with    Chest Pain     PT reports he has been experiencing episodes of chest pain + dizziness since this AM. PT denies cardiac or respiratory hx.      PT ambulatory to triage for above complaint. GCS 15.     Pt is alert and oriented, speaking in full sentences, follows commands and responds appropriately to questions. NAD. Resp are even and unlabored.      Pt placed in lobby. Pt educated on triage process. Pt encouraged to alert staff for any changes.     Patient and staff wearing appropriate PPE

## 2025-01-25 NOTE — ED PROVIDER NOTES
ED Provider Note    CHIEF COMPLAINT  Chief Complaint   Patient presents with    Chest Pain     PT reports he has been experiencing episodes of chest pain + dizziness since this AM. PT denies cardiac or respiratory hx.        EXTERNAL RECORDS REVIEWED  Nuc med treadmill stress test on 1/16/2025 negative for ischemia, no arrhythmias.     HPI/ROS  LIMITATION TO HISTORY   Select: : None  OUTSIDE HISTORIAN(S):  None    Waldemar Ohara is a 34 y.o. male who presents to the ER for couple days of slightly right sided parasternal pain.  Pain is not exertional, it is not pleuritic.  Not reproducible with palpation of the chest or movement.  He has had extensive workup in the past including recent nuc med stress test that did not show any arrhythmias or signs of reversible ischemia.  He also had a colonoscopy and endoscopy done in the past, reportedly had some esophagitis but I do not see the reports of this.  He is not on any medications at this time, has GI follow-up coming up in 1 month from now.  His PCP is following him for the chest pain.  He denies shortness of breath but did have some slight dizziness earlier today.  No palpitations, no leg swelling      PAST MEDICAL HISTORY   has a past medical history of COVID.    SURGICAL HISTORY  patient denies any surgical history    FAMILY HISTORY  Family History   Problem Relation Age of Onset    No Known Problems Mother     Hypertension Father     Hyperlipidemia Father     Hypertension Brother     Obesity Brother     No Known Problems Maternal Grandmother     No Known Problems Maternal Grandfather     No Known Problems Paternal Grandmother     No Known Problems Paternal Grandfather        SOCIAL HISTORY  Social History     Tobacco Use    Smoking status: Never    Smokeless tobacco: Never   Vaping Use    Vaping status: Never Used   Substance and Sexual Activity    Alcohol use: Not Currently     Comment: rare    Drug use: Never    Sexual activity: Not Currently      "Partners: Female     Birth control/protection: None       CURRENT MEDICATIONS  Home Medications       Reviewed by Yoana Weber R.N. (Registered Nurse) on 01/24/25 at 2119  Med List Status: Partial     Medication Last Dose Status   multivitamin Tab  Active   neomycin-polymixin-dexamethasone (MAXITROL) 3.5-65345-3.1 Ointment ophthalmic ointment  Active                    ALLERGIES  No Known Allergies    PHYSICAL EXAM  VITAL SIGNS: BP (!) 144/76   Pulse 64   Temp 36.5 °C (97.7 °F) (Temporal)   Resp 17   Ht 1.854 m (6' 1\")   Wt 102 kg (225 lb 12 oz)   SpO2 96%   BMI 29.78 kg/m²    General: Sitting calmly in stretcher, no distress  Chest: No tenderness throughout the chest  Pulmonary: CTAB normal work of breathing  CV: Regular rate and rhythm no murmurs, 2+ radial pulses, no peripheral edema  Abdomen: Soft nondistended nontender    EKG/LABS  EKG at 2110 NSR rate 83 normal axis normal intervals no significant Q waves, no abnormal T wave inversions, no STEMI  I have independently interpreted this EKG  Troponin undetectable.  CBC and CMP unremarkable.    RADIOLOGY/PROCEDURES   I have independently interpreted the diagnostic imaging associated with this visit and am waiting the final reading from the radiologist.   My preliminary interpretation is as follows: Clear lung fields normal cardiomediastinal silhouette    Radiologist interpretation:  DX-CHEST-PORTABLE (1 VIEW)   Final Result         1.  No acute cardiopulmonary disease.          COURSE & MEDICAL DECISION MAKING    ASSESSMENT, COURSE AND PLAN  Care Narrative: Differential includes esophagitis, ACS, CHF, pneumothorax, pericarditis, pleurisy, PE    On arrival the patient is afebrile, hemodynamically stable slightly hypertensive, breathing comfortably on room air.  He has normal cardiopulmonary exam and no reproducible chest wall tenderness to suggest MSK cause.  No leg swelling to suggest DVT.  Able to PERC out.  Differential above considered.  No acute " interventions necessary.  EKG obtained, no acute ischemic changes.  Reassuring recent nuc med stress test, troponin normal today, very low concern for ACS.  Heart score 0 very low risk.  Chest x-ray did not show any changes to the cardiomediastinal silhouette to suggest dissection or effusion.  Clear lung fields.  Patient resting comfortably at this time without pain vitals remained stable.  Thus I felt he was safe for discharge home to follow-up with his PCP and GI doctor.  Given possible esophagitis that was mentioned to him based on endoscopy last year I recommended trial of course of PPI if he wanted to pick this up over-the-counter.  Discharged home in stable condition with return precautions     DISPOSITION AND DISCUSSIONS    Escalation of care considered, and ultimately not performed:acute inpatient care management, however at this time, the patient is most appropriate for outpatient management    Barriers to care at this time, including but not limited to: None.     Decision tools and prescription drugs considered including, but not limited to: Will consider over-the-counter PPI.    FINAL DIAGNOSIS  1. Chest pain, unspecified type         Electronically signed by: Garcia Murrieta M.D., 1/24/2025 9:35 PM

## 2025-01-28 DIAGNOSIS — R94.31 ABNORMAL EKG: ICD-10-CM

## 2025-01-31 ENCOUNTER — HOSPITAL ENCOUNTER (OUTPATIENT)
Dept: RADIOLOGY | Facility: MEDICAL CENTER | Age: 35
End: 2025-01-31
Attending: STUDENT IN AN ORGANIZED HEALTH CARE EDUCATION/TRAINING PROGRAM
Payer: COMMERCIAL

## 2025-01-31 ENCOUNTER — HOSPITAL ENCOUNTER (OUTPATIENT)
Dept: LAB | Facility: MEDICAL CENTER | Age: 35
End: 2025-01-31
Attending: STUDENT IN AN ORGANIZED HEALTH CARE EDUCATION/TRAINING PROGRAM
Payer: COMMERCIAL

## 2025-01-31 DIAGNOSIS — K92.1 BLOOD IN STOOL: ICD-10-CM

## 2025-01-31 PROCEDURE — 74177 CT ABD & PELVIS W/CONTRAST: CPT

## 2025-01-31 PROCEDURE — 700117 HCHG RX CONTRAST REV CODE 255: Performed by: STUDENT IN AN ORGANIZED HEALTH CARE EDUCATION/TRAINING PROGRAM

## 2025-01-31 RX ADMIN — IOHEXOL 25 ML: 240 INJECTION, SOLUTION INTRATHECAL; INTRAVASCULAR; INTRAVENOUS; ORAL at 12:10

## 2025-01-31 RX ADMIN — IOHEXOL 100 ML: 350 INJECTION, SOLUTION INTRAVENOUS at 12:11

## 2025-02-20 ENCOUNTER — TELEPHONE (OUTPATIENT)
Dept: CARDIOLOGY | Facility: MEDICAL CENTER | Age: 35
End: 2025-02-20
Payer: COMMERCIAL

## 2025-02-21 NOTE — TELEPHONE ENCOUNTER
Called pt regarding New pt appt and he stated that he might not be able to make it on time due to having a GI appt that day. He will call scheduling to reschedule. Also no recent cardio or no records requested.

## 2025-03-06 ENCOUNTER — OFFICE VISIT (OUTPATIENT)
Dept: CARDIOLOGY | Facility: MEDICAL CENTER | Age: 35
End: 2025-03-06
Attending: STUDENT IN AN ORGANIZED HEALTH CARE EDUCATION/TRAINING PROGRAM
Payer: COMMERCIAL

## 2025-03-06 VITALS
HEART RATE: 64 BPM | BODY MASS INDEX: 29.82 KG/M2 | DIASTOLIC BLOOD PRESSURE: 82 MMHG | WEIGHT: 225 LBS | HEIGHT: 73 IN | SYSTOLIC BLOOD PRESSURE: 130 MMHG | OXYGEN SATURATION: 95 % | RESPIRATION RATE: 20 BRPM

## 2025-03-06 DIAGNOSIS — R94.31 ABNORMAL ECG: ICD-10-CM

## 2025-03-06 LAB — EKG IMPRESSION: NORMAL

## 2025-03-06 PROCEDURE — 3075F SYST BP GE 130 - 139MM HG: CPT | Performed by: INTERNAL MEDICINE

## 2025-03-06 PROCEDURE — 99203 OFFICE O/P NEW LOW 30 MIN: CPT | Mod: 25 | Performed by: INTERNAL MEDICINE

## 2025-03-06 PROCEDURE — 93005 ELECTROCARDIOGRAM TRACING: CPT | Mod: TC | Performed by: INTERNAL MEDICINE

## 2025-03-06 PROCEDURE — 93010 ELECTROCARDIOGRAM REPORT: CPT | Performed by: INTERNAL MEDICINE

## 2025-03-06 PROCEDURE — 99211 OFF/OP EST MAY X REQ PHY/QHP: CPT | Performed by: INTERNAL MEDICINE

## 2025-03-06 PROCEDURE — 3079F DIAST BP 80-89 MM HG: CPT | Performed by: INTERNAL MEDICINE

## 2025-03-06 RX ORDER — OMEPRAZOLE 20 MG/1
20 CAPSULE, DELAYED RELEASE ORAL DAILY
COMMUNITY

## 2025-03-06 ASSESSMENT — ENCOUNTER SYMPTOMS
COUGH: 0
PSYCHIATRIC NEGATIVE: 1
CHILLS: 0
MYALGIAS: 0
BRUISES/BLEEDS EASILY: 0
NAUSEA: 0
BLURRED VISION: 0
FEVER: 0
DEPRESSION: 0
NEUROLOGICAL NEGATIVE: 1
VOMITING: 0
ABDOMINAL PAIN: 0
WEIGHT LOSS: 0
FOCAL WEAKNESS: 0
CARDIOVASCULAR NEGATIVE: 1
PALPITATIONS: 0
HEADACHES: 0
CLAUDICATION: 0
SHORTNESS OF BREATH: 0
DIZZINESS: 0
DOUBLE VISION: 0
PHOTOPHOBIA: 1
WEAKNESS: 0
GASTROINTESTINAL NEGATIVE: 1
MUSCULOSKELETAL NEGATIVE: 1
NERVOUS/ANXIOUS: 0
RESPIRATORY NEGATIVE: 1

## 2025-03-06 ASSESSMENT — FIBROSIS 4 INDEX: FIB4 SCORE: 1.1

## 2025-03-06 NOTE — PROGRESS NOTES
Chief Complaint   Patient presents with    Abnormal EKG     NP Dx: R94.31 (ICD-10-CM) - Abnormal EKG       Subjective     [place] Josh Ohara is a 34 y.o. male who presents today as a consult from Josiah Valerio for abnormal EKG.     Thank you for allowing me to evaluate Mr. Moreno, who as you know is a 34 year old male without cardiac history, lifelong nonsmoker, no angiographic evidence of coronary artery disease. He underwent an EKG for chest pain which has since resolved which was minimal abnormal. He is clinically doing well, from cardiac standpoint. He denies fatigue, chest pain, shortness of breath, palpitations, lower extremity edema, dizziness or syncope. He keeps active exercising. He works at [email or web address].     Past Medical History:   Diagnosis Date    COVID      No past surgical history on file.  Family History   Problem Relation Age of Onset    No Known Problems Mother     Hypertension Father     Hyperlipidemia Father     Hypertension Brother     Obesity Brother     No Known Problems Maternal Grandmother     No Known Problems Maternal Grandfather     No Known Problems Paternal Grandmother     No Known Problems Paternal Grandfather      Social History     Socioeconomic History    Marital status: Single     Spouse name: Not on file    Number of children: Not on file    Years of education: Not on file    Highest education level: Associate degree: occupational, technical, or vocational program   Occupational History    Not on file   Tobacco Use    Smoking status: Never    Smokeless tobacco: Never   Vaping Use    Vaping status: Never Used   Substance and Sexual Activity    Alcohol use: Not Currently     Comment: rare    Drug use: Never    Sexual activity: Not Currently     Partners: Female     Birth control/protection: None   Other Topics Concern    Not on file   Social History Narrative    Not on file     Social Drivers of Health     Financial Resource Strain: Low Risk  (12/3/2024)    Overall Financial  Resource Strain (CARDIA)     Difficulty of Paying Living Expenses: Not very hard   Food Insecurity: No Food Insecurity (12/3/2024)    Hunger Vital Sign     Worried About Running Out of Food in the Last Year: Never true     Ran Out of Food in the Last Year: Never true   Transportation Needs: No Transportation Needs (12/3/2024)    PRAPARE - Transportation     Lack of Transportation (Medical): No     Lack of Transportation (Non-Medical): No   Physical Activity: Inactive (12/3/2024)    Exercise Vital Sign     Days of Exercise per Week: 0 days     Minutes of Exercise per Session: 10 min   Stress: No Stress Concern Present (12/3/2024)    [employer] [place] [employer] - Occupational Stress Questionnaire     Feeling of Stress : Only a little   Social Connections: Moderately Integrated (12/3/2024)    Social Connection and Isolation Panel [NHANES]     Frequency of Communication with Friends and Family: More than three times a week     Frequency of Social Gatherings with Friends and Family: Once a week     Attends Episcopal Services: More than 4 times per year     Active Member of Clubs or Organizations: No     Attends Club or Organization Meetings: Never     Marital Status: Living with partner   Intimate Partner Violence: Not on file   Housing Stability: Unknown (12/3/2024)    Housing Stability Vital Sign     Unable to Pay for Housing in the Last Year: No     Number of Times Moved in the Last Year: Not on file     Homeless in the Last Year: No     No Known Allergies  (Medications reviewed.)  Outpatient Encounter Medications as of 3/6/2025   Medication Sig Dispense Refill    omeprazole (PRILOSEC) 20 MG delayed-release capsule Take 20 mg by mouth every day.      multivitamin Tab Take 1 Tablet by mouth every day.      neomycin-polymixin-dexamethasone (MAXITROL) 3.5-52094-6.1 Ointment ophthalmic ointment PLEASE SEE ATTACHED FOR DETAILED DIRECTIONS (Patient not taking: Reported on 3/6/2025)       No  "facility-administered encounter medications on file as of 3/6/2025.     Review of Systems   Constitutional:  Positive for malaise/fatigue. Negative for chills, fever and weight loss.   HENT: Negative.  Negative for hearing loss.    Eyes:  Positive for photophobia. Negative for blurred vision and double vision.   Respiratory: Negative.  Negative for cough and shortness of breath.    Cardiovascular: Negative.  Negative for chest pain, palpitations, claudication and leg swelling.   Gastrointestinal: Negative.  Negative for abdominal pain, nausea and vomiting.   Genitourinary: Negative.  Negative for dysuria and urgency.   Musculoskeletal: Negative.  Negative for joint pain and myalgias.   Skin: Negative.  Negative for itching and rash.   Neurological: Negative.  Negative for dizziness, focal weakness, weakness and headaches.   Endo/Heme/Allergies: Negative.  Does not bruise/bleed easily.   Psychiatric/Behavioral: Negative.  Negative for depression. The patient is not nervous/anxious.               Objective     /82 (BP Location: Left arm, Patient Position: Sitting, BP Cuff Size: Adult)   Pulse 64   Resp 20   Ht 1.854 m (6' 1\")   Wt 102 kg (225 lb)   SpO2 95%   BMI 29.69 kg/m²     Physical Exam  Constitutional:       Appearance: Normal appearance. He is well-developed and normal weight.   HENT:      Head: Normocephalic and atraumatic.   Neck:      Vascular: No JVD.   Cardiovascular:      Rate and Rhythm: Normal rate and regular rhythm.      Heart sounds: Normal heart sounds.   Pulmonary:      Effort: Pulmonary effort is normal.      Breath sounds: Normal breath sounds.   Abdominal:      General: Bowel sounds are normal.      Palpations: Abdomen is soft.      Comments: No hepatosplenomegaly.   Musculoskeletal:         General: Normal range of motion.   Lymphadenopathy:      Cervical: No cervical adenopathy.   Skin:     General: Skin is warm and dry.   Neurological:      Mental Status: He is alert and oriented " to person, place, and time.            CARDIAC STUDIES/PROCEDURES:    EXERCISE TREADMILL TEST (01/16/25)  Fair exercise capacity for age and gender achieving target heart rate and 8.6 METS on the treadmill Marck protocol for 6:51 min.  Normal blood pressure response to stress  Adequate workload  Negative stress EKG for ischemia  No arrhythmias noted  (study result reviewed)     EKG was ordered for abnormal EKG, performed on (03/06/25) was reviewed: EKG, personally interpreted shows sinus rhythm with right axis deviation.     Laboratory results of (08/21/24) were reviewed. Cholesterol profile of 193/89/48/127 mg/dL noted.    Assessment & Plan     1. Abnormal ECG  EKG          Medical Decision Making: Today's Assessment/Status/Plan:        Abnormal EKG: He is a 34 year old male without cardiac history. He is clinically doing well, from cardiac standpoint. The EKG is abnormal as described above. We will perform an echocardiogram.    We will follow up in 3 months.    Thank you for this consult.

## 2025-03-10 ENCOUNTER — HOSPITAL ENCOUNTER (OUTPATIENT)
Facility: MEDICAL CENTER | Age: 35
End: 2025-03-10
Attending: INTERNAL MEDICINE
Payer: COMMERCIAL

## 2025-03-10 LAB — LACTOFERRIN STL QL IA: NEGATIVE

## 2025-03-10 PROCEDURE — 87209 SMEAR COMPLEX STAIN: CPT

## 2025-03-10 PROCEDURE — 83993 ASSAY FOR CALPROTECTIN FECAL: CPT

## 2025-03-10 PROCEDURE — 87177 OVA AND PARASITES SMEARS: CPT

## 2025-03-10 PROCEDURE — 82653 EL-1 FECAL QUANTITATIVE: CPT

## 2025-03-10 PROCEDURE — 83630 LACTOFERRIN FECAL (QUAL): CPT

## 2025-03-13 LAB
CALPROTECTIN STL-MCNT: 26 UG/G
ELASTASE PANC STL-MCNT: 445 UG/G

## 2025-03-17 LAB — OVA AND PARASITE, FECAL INTERPRETATION Q0595: NEGATIVE

## 2025-03-30 ENCOUNTER — APPOINTMENT (OUTPATIENT)
Dept: URGENT CARE | Facility: PHYSICIAN GROUP | Age: 35
End: 2025-03-30
Payer: COMMERCIAL

## 2025-04-14 NOTE — PROGRESS NOTES
Subjective:     CC:  The encounter diagnosis was Pulmonary nodules.    HISTORY OF THE PRESENT ILLNESS: Patient is a 34 y.o. male. This pleasant patient is here today to establish care in the [facility] and discuss lung nodule. His referring provider is MATILDA Bettencourt.  Her PCP is Dr. Che.    Pulmonary nodule- Waldemar is a 34M nonsmoker who has hx of esophagitis presenting for evaluation of an incidental lung nodule.  He was first noted to have a pulmonary nodule on CT abdomen 9/21/24 which showed a 3mm RML nodule. Follow-up CT on 9/27/24 showed a 10.5mm right lower lobe lung nodule (image 77).  The 10.5mm RLL lung nodule remained stable on CT chest 12/23/24.    He has no family hx of lung disease.  He has no family hx of lung cancer, but his grandmothers had leukemia and ovarian cancer.  He has not had significant second hand tobacco smoke exposure except when he went to [facility] years ago.  He has lived in [place] but traveled to [place] often as a child.  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  He previously worked at [employer]  He enjoys playing sports, running and going to the gym.  He has no known exposure to dust, fumes, solvents, gases, tuberculosis, radon, chemicals, radiation, or asbestos.  He has no personal hx of asthma, emphysema, COPD, or PNA.  He has never been hospitalized for respiratory problems or been on a ventilator.    He had COVID19 3 times.  Two episodes prior to moving (tested at home).  He moved to a new home in July 2024 and in August 2024 he started having lightheadness, fatigue, joint pain.  This worsened when he had COVID19 infection in mid August.  These symptoms have almost completely resolved, but he has occasional brain fog, severe hungry with shakiness.  He has had his home checked for mold and was told there was no mold.  He has seen GI regarding abnormal stools and decreased appetite and was dx with GERD and started  "on omeprazole.  His appetite has improved and now he gets very hungry.  His stools are now irregular and more \"in pieces.\"  No blood in stools but he sees small red spots in his stools. He had EGD and colonoscopy 10/24.    No cough, sputum, hemoptysis, SOB, wheezing.  He has been having intermittent right lower rib pain for the last 1.5 weeks.  The discomfort is a hot sensation.  His rib discomfort started after he moved a heavy object at work and is triggered by repetitive twisting.  No chest pain for the last month, palpitations.  He is planning to get ECHO as recommended by cardiology.  No nausea, vomiting.  No other abnormal bleeding.  States his previous fatigue has resolved.      Allergies: Patient has no known allergies.    Current Outpatient Medications Ordered in Epic   Medication Sig Dispense Refill    multivitamin Tab Take 1 Tablet by mouth every day.      omeprazole (PRILOSEC) 20 MG delayed-release capsule Take 20 mg by mouth every day. (Patient not taking: Take 20 mg by mouth every day.)       No current [facility]-ordered facility-administered medications on file.       Past Medical History:   Diagnosis Date    COVID        Past Surgical History:   Procedure Laterality Date    NO PERTINENT PAST SURGICAL HISTORY         Social History     Tobacco Use    Smoking status: Never    Smokeless tobacco: Never   Vaping Use    Vaping status: Never Used   Substance Use Topics    Alcohol use: Not Currently     Comment: rare    Drug use: Never       Social History     Social History Narrative    Not on file       Family History   Problem Relation Age of Onset    No Known Problems Mother     Hypertension Father     Hyperlipidemia Father     Hypertension Brother     Obesity Brother     Cancer Maternal Grandmother         ovarian    No Known Problems Maternal Grandfather     Cancer Paternal Grandmother         leukemia    No Known Problems Paternal Grandfather     Heart Attack Neg Hx     Heart Disease Neg Hx        Health " "Maintenance: COVID19 vaccine recommended    ROS:   Gen: no fevers/chills, no changes in weight  Eyes: no changes in vision  ENT: no sore throat, no hearing loss, no bloody nose  Pulm: no sob, no cough  CV: no chest pain, no palpitations  GI: no nausea/vomiting, no diarrhea  : no dysuria  MSk: no myalgias  Skin: no rash  Neuro: no headaches, no numbness/tingling  Heme/Lymph: no easy bruising      Objective:     Exam: /74 (BP Location: Right arm, Patient Position: Sitting, BP Cuff Size: Adult)   Pulse 60   Resp 18   Ht 1.854 m (6' 1\")   Wt 105 kg (231 lb)   SpO2 100%  Body mass index is 30.48 kg/m².    General: Normal appearing. No distress.  HEENT: Normocephalic.  Canals are clear bilaterally, tympanic membranes are benign, nasal mucosa benign, oropharynx is without erythema, edema or exudates.   Eyes: Eyes conjunctiva clear lids without ptosis, pupils equal and reactive to light accommodation, ears normal shape and contour  Neck: Supple without JVD or bruit. Thyroid is not enlarged.  Pulmonary: Clear to ausculation.  Normal effort. No rales, ronchi, or wheezing.  Cardiovascular: Regular rate and rhythm without murmur. Carotid and radial pulses are intact and equal bilaterally.  Abdomen: Soft, nontender, nondistended. Normal bowel sounds. Liver and spleen are not palpable  Neurologic: No gross motor deficits  Lymph: No cervical or supraclavicular lymph nodes are palpable  Skin: Warm and dry.  No obvious lesions.  Musculoskeletal: Normal gait. No extremity cyanosis, clubbing, or edema.  No reproducible rib discomfort.  Psych: Normal mood and affect. Alert and oriented. Judgment and insight is normal.      Assessment & Plan:   34 y.o. male with the following -    1. Pulmonary nodules  Newly presenting, stable, no acute pulmonary symptoms.  [place] ha a stable 3mm RML nodule and 10.5mm right lower lung nodule on CT imaging 9/27/24-12/23/24.  We discussed that lung nodules can be due to inflammation, " scarring, infection, tumor/malignancy.  Given the stability of the nodules and their appearance, they are behaving benign and are likely due to a lymph nodes. CBC 1/24/25 normal.  Recommended continued serial monitoring with an annual follow-up CT (due 12/24/25).  Encouraged avoidance of tobacco products and lung irritants.  Follow-up and red flag precautions given. Patient expressed understanding and agreement with plan.  - CT-CHEST (THORAX) W/O; Future    2. Rib pain  New issue, intermittent, currently improved.  Pain was not reproducible during [place]'s examination visit today.  Given the location of the discomfort and the onset of the discomfort being after doing repetitive lifting and twisting, it is unlikely due to his lung nodules and more likely due to MSK strain.  Recommended he follow-up with his PCP for continued monitoring and evaluation.      41 minutes was spent face-to-face and chart reviewing for this encounter    Return in about 9 months (around 1/16/2026) for follow-up lung nodules after CT obtained.    Please note that this dictation was created using voice recognition software. I have made every reasonable attempt to correct obvious errors, but I expect that there are errors of grammar and possibly content that I did not discover before finalizing the note.

## 2025-04-16 ENCOUNTER — OFFICE VISIT (OUTPATIENT)
Dept: SLEEP MEDICINE | Facility: MEDICAL CENTER | Age: 35
End: 2025-04-16
Attending: NURSE PRACTITIONER
Payer: COMMERCIAL

## 2025-04-16 ENCOUNTER — HOSPITAL ENCOUNTER (OUTPATIENT)
Dept: RADIOLOGY | Facility: MEDICAL CENTER | Age: 35
End: 2025-04-16
Attending: FAMILY MEDICINE
Payer: COMMERCIAL

## 2025-04-16 ENCOUNTER — OFFICE VISIT (OUTPATIENT)
Dept: URGENT CARE | Facility: PHYSICIAN GROUP | Age: 35
End: 2025-04-16
Payer: COMMERCIAL

## 2025-04-16 VITALS
WEIGHT: 231 LBS | TEMPERATURE: 98.1 F | BODY MASS INDEX: 30.62 KG/M2 | OXYGEN SATURATION: 98 % | SYSTOLIC BLOOD PRESSURE: 120 MMHG | DIASTOLIC BLOOD PRESSURE: 76 MMHG | RESPIRATION RATE: 18 BRPM | HEIGHT: 73 IN | HEART RATE: 68 BPM

## 2025-04-16 VITALS
RESPIRATION RATE: 18 BRPM | BODY MASS INDEX: 30.62 KG/M2 | WEIGHT: 231 LBS | HEIGHT: 73 IN | SYSTOLIC BLOOD PRESSURE: 120 MMHG | HEART RATE: 60 BPM | OXYGEN SATURATION: 100 % | DIASTOLIC BLOOD PRESSURE: 74 MMHG

## 2025-04-16 DIAGNOSIS — R91.8 PULMONARY NODULES: ICD-10-CM

## 2025-04-16 DIAGNOSIS — R07.81 RIB PAIN: ICD-10-CM

## 2025-04-16 DIAGNOSIS — K20.90 ESOPHAGITIS: ICD-10-CM

## 2025-04-16 DIAGNOSIS — R07.89 CHEST WALL PAIN: Primary | ICD-10-CM

## 2025-04-16 DIAGNOSIS — R07.89 CHEST WALL PAIN: ICD-10-CM

## 2025-04-16 PROCEDURE — 99212 OFFICE O/P EST SF 10 MIN: CPT | Performed by: NURSE PRACTITIONER

## 2025-04-16 PROCEDURE — 3078F DIAST BP <80 MM HG: CPT | Performed by: FAMILY MEDICINE

## 2025-04-16 PROCEDURE — 71046 X-RAY EXAM CHEST 2 VIEWS: CPT

## 2025-04-16 PROCEDURE — 99214 OFFICE O/P EST MOD 30 MIN: CPT | Performed by: FAMILY MEDICINE

## 2025-04-16 PROCEDURE — 99203 OFFICE O/P NEW LOW 30 MIN: CPT | Performed by: FAMILY MEDICINE

## 2025-04-16 PROCEDURE — 3074F SYST BP LT 130 MM HG: CPT | Performed by: FAMILY MEDICINE

## 2025-04-16 RX ORDER — NAPROXEN 500 MG/1
TABLET ORAL
Qty: 14 TABLET | Refills: 0 | Status: SHIPPED | OUTPATIENT
Start: 2025-04-16

## 2025-04-16 ASSESSMENT — FIBROSIS 4 INDEX
FIB4 SCORE: 1.1
FIB4 SCORE: 1.1

## 2025-04-16 NOTE — PROGRESS NOTES
"Subjective     [place] Josh Ohara is a 34 y.o. male who presents with Rib Injury (Right rib pain x 3-4 weeks being more constant this week )    This is a  new problem with uncertain prognosis:    34 y.o. who has come to the walk-in clinic today for approximately 3 or 4 weeks with some intermittent pain and feels on right lower anterior ribs.  Comes and goes has been more constant this week described as a mild discomfort like 2 or 3 out of 10.  Seems to be more noticeable when doing physical activity or lifting things.  No associated nausea vomiting fevers chills shortness of breath or bloody sputum.  No changes with eating.  No abdominal pain or stool changes          ALLERGIES:  Patient has no known allergies.     PMH:  Past Medical History:   Diagnosis Date    COVID         PSH:  Past Surgical History:   Procedure Laterality Date    NO PERTINENT PAST SURGICAL HISTORY         MEDS:    Current Outpatient Medications:     naproxen (NAPROSYN) 500 MG Tab, 1 tab Q12 hrs x 3 days then  as needed thereafter for pain, Disp: 14 Tablet, Rfl: 0    multivitamin Tab, Take 1 Tablet by mouth every day., Disp: , Rfl:     ** I have documented what I find to be significant in regards to past medical, social, family and surgical history  in my HPI or under PMH/PSH/FH review section, otherwise it is noncontributory **           HPI    Review of Systems   Cardiovascular:  Positive for chest pain (ribs).   All other systems reviewed and are negative.             Objective     /76   Pulse 68   Temp 36.7 °C (98.1 °F)   Resp 18   Ht 1.854 m (6' 1\")   Wt 105 kg (231 lb)   SpO2 98%   BMI 30.48 kg/m²      Physical Exam  Vitals and nursing note reviewed.   Constitutional:       General: He is not in acute distress.     Appearance: Normal appearance. He is well-developed. He is not ill-appearing, toxic-appearing or diaphoretic.   HENT:      Head: Normocephalic.   Cardiovascular:      Rate and Rhythm: Normal rate and regular " rhythm.      Heart sounds: Normal heart sounds.   Pulmonary:      Effort: Pulmonary effort is normal. No respiratory distress.   Abdominal:      Palpations: Abdomen is soft.      Tenderness: There is no abdominal tenderness. There is no right CVA tenderness, guarding or rebound.          Comments: No skin lesions or obvious deformity has some mild reproducible tenderness in the lower right rib margin area.   Neurological:      Mental Status: He is alert.      Motor: No abnormal muscle tone.   Psychiatric:         Mood and Affect: Mood normal.         Behavior: Behavior normal.         1. Chest wall pain  DX-CHEST-2 VIEWS    naproxen (NAPROSYN) 500 MG Tab      2. Esophagitis              34-year-old with history of esophagitis not currently on any antacids with 3 to 4 weeks of a mild right lower sided rib area pain.  Seems to be a low bit more musculoskeletal in nature.  Will do a trial of NSAID and recommend over-the-counter PPI while on the NSAID due to his history of esophagitis.  Will check a AP lateral chest x-ray today.  And encouraged PCP follow-up.      - Dx, plan & d/c instructions discussed   - Rest, stay hydrated, OTC  Tylenol as needed and Prevacid x 7 days      Follow up with your regular primary care providers office within a week to keep them updated and informed of this visit and for regular routine health maintenance check-ups. ER if not improving in 2-3 days or if feeling/getting worse. (If you do not have a primary care provider and need to schedule one you may call [facility] at [phone] to do this).    Patient left in stable condition               Discussed if any in-clinic testing done they should check [facility] later today for results and instructions.  Testing such as strep, covid, flu, RSV and x-rays    Discussed if any testing, labs or imaging studies are obtained outside of the [facility] facility, it is their responsibility to contact the Urgent Care and let us know that it was done and get us  the results so adequate follow up can be initiated    Any pertinent prior lab work and/or imaging studies in Epic have been reviewed by me today on day of this visit and taken into account for my treatment and plan today    Any pertinent PMH/PSH and/or chronic conditions and medications if any were reviewed today and taken into account for my treatment and plan today    Pertinent prior office visit, ER and urgent care notes in [facility] have been reviewed by me today on day of this visit.    Please note that this dictation may have been created using voice recognition software, if so I have made every reasonable attempt to correct obvious errors, but I expect that there are errors of grammar and possibly content that I did not discover before finalizing the note.

## 2025-04-21 ENCOUNTER — HOSPITAL ENCOUNTER (EMERGENCY)
Facility: MEDICAL CENTER | Age: 35
End: 2025-04-22
Attending: EMERGENCY MEDICINE
Payer: COMMERCIAL

## 2025-04-21 DIAGNOSIS — R20.0 NUMBNESS OF FOOT: ICD-10-CM

## 2025-04-21 DIAGNOSIS — R42 DIZZINESS: ICD-10-CM

## 2025-04-21 LAB — EKG IMPRESSION: NORMAL

## 2025-04-21 PROCEDURE — 80053 COMPREHEN METABOLIC PANEL: CPT

## 2025-04-21 PROCEDURE — 84100 ASSAY OF PHOSPHORUS: CPT

## 2025-04-21 PROCEDURE — 36415 COLL VENOUS BLD VENIPUNCTURE: CPT

## 2025-04-21 PROCEDURE — 99283 EMERGENCY DEPT VISIT LOW MDM: CPT

## 2025-04-21 PROCEDURE — 85025 COMPLETE CBC W/AUTO DIFF WBC: CPT

## 2025-04-21 PROCEDURE — 93005 ELECTROCARDIOGRAM TRACING: CPT | Mod: TC | Performed by: EMERGENCY MEDICINE

## 2025-04-21 PROCEDURE — 83735 ASSAY OF MAGNESIUM: CPT

## 2025-04-21 ASSESSMENT — FIBROSIS 4 INDEX: FIB4 SCORE: 1.1

## 2025-04-21 NOTE — Clinical Note
Waldemar Ohara was seen and treated in our emergency department on 4/21/2025.  He may return to work on 04/23/2025.       If you have any questions or concerns, please don't hesitate to call.      Garcia Murrieta M.D.

## 2025-04-22 VITALS
OXYGEN SATURATION: 96 % | TEMPERATURE: 96.8 F | HEIGHT: 73 IN | BODY MASS INDEX: 30.85 KG/M2 | SYSTOLIC BLOOD PRESSURE: 138 MMHG | RESPIRATION RATE: 16 BRPM | HEART RATE: 59 BPM | DIASTOLIC BLOOD PRESSURE: 83 MMHG | WEIGHT: 232.81 LBS

## 2025-04-22 LAB
ALBUMIN SERPL BCP-MCNC: 4.6 G/DL (ref 3.2–4.9)
ALBUMIN/GLOB SERPL: 1.5 G/DL
ALP SERPL-CCNC: 69 U/L (ref 30–99)
ALT SERPL-CCNC: 18 U/L (ref 2–50)
ANION GAP SERPL CALC-SCNC: 13 MMOL/L (ref 7–16)
AST SERPL-CCNC: 21 U/L (ref 12–45)
BASOPHILS # BLD AUTO: 0.4 % (ref 0–1.8)
BASOPHILS # BLD: 0.03 K/UL (ref 0–0.12)
BILIRUB SERPL-MCNC: 0.6 MG/DL (ref 0.1–1.5)
BUN SERPL-MCNC: 24 MG/DL (ref 8–22)
CALCIUM ALBUM COR SERPL-MCNC: 9.3 MG/DL (ref 8.5–10.5)
CALCIUM SERPL-MCNC: 9.8 MG/DL (ref 8.5–10.5)
CHLORIDE SERPL-SCNC: 104 MMOL/L (ref 96–112)
CO2 SERPL-SCNC: 22 MMOL/L (ref 20–33)
CREAT SERPL-MCNC: 1.18 MG/DL (ref 0.5–1.4)
EOSINOPHIL # BLD AUTO: 0.12 K/UL (ref 0–0.51)
EOSINOPHIL NFR BLD: 1.8 % (ref 0–6.9)
ERYTHROCYTE [DISTWIDTH] IN BLOOD BY AUTOMATED COUNT: 41.1 FL (ref 35.9–50)
GFR SERPLBLD CREATININE-BSD FMLA CKD-EPI: 83 ML/MIN/1.73 M 2
GLOBULIN SER CALC-MCNC: 3 G/DL (ref 1.9–3.5)
GLUCOSE SERPL-MCNC: 103 MG/DL (ref 65–99)
HCT VFR BLD AUTO: 44.1 % (ref 42–52)
HGB BLD-MCNC: 14.8 G/DL (ref 14–18)
IMM GRANULOCYTES # BLD AUTO: 0.05 K/UL (ref 0–0.11)
IMM GRANULOCYTES NFR BLD AUTO: 0.7 % (ref 0–0.9)
LYMPHOCYTES # BLD AUTO: 2.38 K/UL (ref 1–4.8)
LYMPHOCYTES NFR BLD: 35 % (ref 22–41)
MAGNESIUM SERPL-MCNC: 2.1 MG/DL (ref 1.5–2.5)
MCH RBC QN AUTO: 30.1 PG (ref 27–33)
MCHC RBC AUTO-ENTMCNC: 33.6 G/DL (ref 32.3–36.5)
MCV RBC AUTO: 89.8 FL (ref 81.4–97.8)
MONOCYTES # BLD AUTO: 0.52 K/UL (ref 0–0.85)
MONOCYTES NFR BLD AUTO: 7.6 % (ref 0–13.4)
NEUTROPHILS # BLD AUTO: 3.7 K/UL (ref 1.82–7.42)
NEUTROPHILS NFR BLD: 54.5 % (ref 44–72)
NRBC # BLD AUTO: 0 K/UL
NRBC BLD-RTO: 0 /100 WBC (ref 0–0.2)
PHOSPHATE SERPL-MCNC: 3.6 MG/DL (ref 2.5–4.5)
PLATELET # BLD AUTO: 173 K/UL (ref 164–446)
PMV BLD AUTO: 11.8 FL (ref 9–12.9)
POTASSIUM SERPL-SCNC: 4 MMOL/L (ref 3.6–5.5)
PROT SERPL-MCNC: 7.6 G/DL (ref 6–8.2)
RBC # BLD AUTO: 4.91 M/UL (ref 4.7–6.1)
SODIUM SERPL-SCNC: 139 MMOL/L (ref 135–145)
WBC # BLD AUTO: 6.8 K/UL (ref 4.8–10.8)

## 2025-04-22 NOTE — ED NOTES
Report received form Kelvin. Assumed care of pt, pt resting comfortably in bed, connected to monitor.

## 2025-04-22 NOTE — ED PROVIDER NOTES
ED Provider Note    CHIEF COMPLAINT  Chief Complaint   Patient presents with    Foot Pain    Numbness       EXTERNAL RECORDS REVIEWED  Urgent care visit April 16, 2025 seen for chest pain, had normal chest x-ray, was diagnosed with musculoskeletal pain and esophagitis.  He was given naproxen.    HPI/ROS  LIMITATION TO HISTORY   Select: : None  OUTSIDE HISTORIAN(S):  None    Waldemar Ohara is a 34 y.o. male who presents to the ER for chronic bilateral foot numbness and intermittent dizziness.  Foot numbness has been present for quite a while, it is both of his feet typically just on the feet but today he felt like it was spreading up the right leg and he also got more dizzy than usual.  Thus he decided to come here.  Currently he is not dizzy and the numbness is back to his baseline.  Denies any weakness in the lower extremities.  He denies any back pain or injuries to the back, has went to the bathroom normally today.  He reports that sometimes sitting in different positions will exacerbate the numbness but when he stands up it is normal.  There is no skin changes that occur.  There is no swelling in the legs.  He denies any chest pain or shortness of breath.  Did have an x-ray recently within the last week that was normal of his chest.    PAST MEDICAL HISTORY   has a past medical history of COVID.    SURGICAL HISTORY   has a past surgical history that includes no pertinent past surgical history.    FAMILY HISTORY  Family History   Problem Relation Age of Onset    No Known Problems Mother     Hypertension Father     Hyperlipidemia Father     Hypertension Brother     Obesity Brother     Cancer Maternal Grandmother         ovarian    No Known Problems Maternal Grandfather     Cancer Paternal Grandmother         leukemia    No Known Problems Paternal Grandfather     Heart Attack Neg Hx     Heart Disease Neg Hx        SOCIAL HISTORY  Social History     Tobacco Use    Smoking status: Never    Smokeless tobacco:  "Never   Vaping Use    Vaping status: Never Used   Substance and Sexual Activity    Alcohol use: Not Currently     Comment: rare    Drug use: Never    Sexual activity: Not Currently     Partners: Female     Birth control/protection: None       CURRENT MEDICATIONS  Home Medications       Reviewed by Roberta Aj R.N. (Registered Nurse) on 04/21/25 at 2219  Med List Status: Partial     Medication Last Dose Status   multivitamin Tab  Active   naproxen (NAPROSYN) 500 MG Tab  Active                    ALLERGIES  No Known Allergies    PHYSICAL EXAM  VITAL SIGNS: /83   Pulse (!) 59   Temp 36 °C (96.8 °F) (Temporal)   Resp 16   Ht 1.854 m (6' 1\")   Wt 106 kg (232 lb 12.9 oz)   SpO2 96%   BMI 30.71 kg/m²    General: Sitting calmly in chair, no distress  HEENT: NCAT, moist mucous membranes, normal conjunctiva  CV: Regular rate rhythm  Pulmonary: Breathing comfortably on room air  MSK: No swelling in the lower extremities.  No overlying skin changes, warm and well-perfused.  2+ DP and PT pulses bilaterally.  Full strength in the ankles and toes, sensation intact throughout the bilateral feet.  No swelling of the legs.  No tenderness throughout the lower back    EKG/LABS  CBC shows normal cell counts, no anemia to explain symptoms no leukocytosis to suggest infection.  Electrolytes including mag and Phos are normal.  BUN slightly elevated at 24 but normal creatinine.  Normal liver enzymes.    EKG at 2337 shows NSR rate 54 normal axis, normal intervals, no abnormal T wave inversions no sign of Brugada, WPW  I have independently interpreted this EKG      COURSE & MEDICAL DECISION MAKING    ASSESSMENT, COURSE AND PLAN  Care Narrative: Differential includes neuropathy, diabetes, electrolyte abnormality, vitamin deficiency, sciatica, arrhythmia, dehydration, anxiety    On arrival the patient is well-appearing.  Slightly hypertensive but this did resolve.  He has no dizziness or cardiorespiratory symptoms at this " time.  An EKG was reassuring, normal.  No indication on exam for any imaging.  Differential above considered.  Basic labs obtained which show normal electrolytes and liver enzymes, mild elevation in BUN may be related to some dehydration, recommended increasing oral hydration at home.  Otherwise normal renal function.  Normal cell counts on CBC.  Patient states he had an A1c that was 5.4 done recently.  Low concern for diabetes.  At this time I do not see any emergent findings to warrant further workup in the ER, recommended PCP follow-up which she was amenable to.  Discharged home in stable condition with return precautions    DISPOSITION AND DISCUSSIONS    Escalation of care considered, and ultimately not performed:diagnostic imaging    Barriers to care at this time, including but not limited to: None none.     Decision tools and prescription drugs considered including, but not limited to: None.    FINAL DIAGNOSIS  1. Dizziness    2. Numbness of foot         Electronically signed by: Garcia Murrieta M.D., 4/21/2025 11:20 PM

## 2025-04-22 NOTE — DISCHARGE INSTRUCTIONS
Today your EKG and blood work were very reassuring.  Continue working with your primary doctor to see if there are other test that they would like to obtain to look into your foot numbness.

## 2025-04-22 NOTE — ED NOTES
Pt ambulated back to [facility] with a steady gait without incident. Primary assessment complete. Chart up for ERP.

## 2025-04-22 NOTE — ED TRIAGE NOTES
"Chief Complaint   Patient presents with    Foot Pain    Numbness     Ambulatory to triage for above complaints; right foot pain with numbness especially when he put on his shoes, states it's both of his feet but this morning, more on the right leg calf;   States doesn't hurt when he moves but pain and numbness come when he is sitting.    Denies any trauma or fall. AOX4 GCS15    Denies any PMH, not on any meds.    BP (!) 159/98   Pulse 64   Temp 35.9 °C (96.6 °F) (Temporal)   Resp 16   Ht 1.854 m (6' 1\")   Wt 106 kg (232 lb 12.9 oz)   SpO2 98%   BMI 30.71 kg/m²     "

## 2025-04-24 ENCOUNTER — OFFICE VISIT (OUTPATIENT)
Dept: MEDICAL GROUP | Facility: LAB | Age: 35
End: 2025-04-24
Payer: COMMERCIAL

## 2025-04-24 VITALS
RESPIRATION RATE: 16 BRPM | HEART RATE: 52 BPM | OXYGEN SATURATION: 99 % | SYSTOLIC BLOOD PRESSURE: 112 MMHG | DIASTOLIC BLOOD PRESSURE: 68 MMHG | TEMPERATURE: 97.6 F | HEIGHT: 73 IN | WEIGHT: 229.9 LBS | BODY MASS INDEX: 30.47 KG/M2

## 2025-04-24 DIAGNOSIS — R42 INTERMITTENT LIGHTHEADEDNESS: ICD-10-CM

## 2025-04-24 DIAGNOSIS — R07.81 RIB PAIN: ICD-10-CM

## 2025-04-24 DIAGNOSIS — M79.2 NEUROPATHIC PAIN: ICD-10-CM

## 2025-04-24 DIAGNOSIS — R00.1 BRADYCARDIA: ICD-10-CM

## 2025-04-24 PROBLEM — R19.4 CHANGE IN BOWEL HABITS: Status: ACTIVE | Noted: 2025-04-24

## 2025-04-24 PROBLEM — R41.89 BRAIN FOG: Status: ACTIVE | Noted: 2025-04-24

## 2025-04-24 PROCEDURE — 99213 OFFICE O/P EST LOW 20 MIN: CPT | Performed by: STUDENT IN AN ORGANIZED HEALTH CARE EDUCATION/TRAINING PROGRAM

## 2025-04-24 PROCEDURE — 3074F SYST BP LT 130 MM HG: CPT | Performed by: STUDENT IN AN ORGANIZED HEALTH CARE EDUCATION/TRAINING PROGRAM

## 2025-04-24 PROCEDURE — 3078F DIAST BP <80 MM HG: CPT | Performed by: STUDENT IN AN ORGANIZED HEALTH CARE EDUCATION/TRAINING PROGRAM

## 2025-04-24 ASSESSMENT — ENCOUNTER SYMPTOMS
WEIGHT LOSS: 0
COUGH: 0
DIZZINESS: 1
HEARTBURN: 0
CHILLS: 0
FEVER: 0
NAUSEA: 0
SHORTNESS OF BREATH: 0
CONSTIPATION: 0
ABDOMINAL PAIN: 0
VOMITING: 0

## 2025-04-24 ASSESSMENT — FIBROSIS 4 INDEX: FIB4 SCORE: 0.97

## 2025-04-24 NOTE — PATIENT INSTRUCTIONS
Lidocaine, biofreeze, capsaicin (wear gloves)    Gabapentin (oral if need, 300mg up to 3 times a day)    Compression stockings if prolonged standing

## 2025-04-24 NOTE — PROGRESS NOTES
Verbal consent was acquired by the patient to use [employer] ambient listening note generation during this visit Yes.    Subjective:     Chief Complaint   Patient presents with    Follow-Up     LIGHT HEADED, DIZZY, FEET NUMBESS, GI ISSUES/ WENT TO ER  AND THEY SAID HE NEEDS TO SEE HIS PCP       HPI:   PCP Josiah Che D.O. unavailable.    History of Present Illness  The patient presents for an ER follow-up. He was seen in the emergency department on 04/21/2025 for chronic bilateral foot numbness and intermittent dizziness.    Persistent lightheadedness has been experienced for the past few weeks, lasting most of the day or just a few minutes. Occasional lightheadedness is reported, but there is no associated tachycardia. Orthostatic blood pressure testing was performed once in the ER, but the results were inconclusive. A history of low baseline heart rate due to an athletic lifestyle is noted. Syncope has not occurred. Various health issues have been managed by Dr. Che over the past 7 to 8 months, with a possible link to long COVID suggested, though prescribed treatments have not alleviated symptoms.    Right rib pain has been more noticeable for the past 3 to 4 weeks, becoming more constant and radiating towards the back. The pain is described as a burning sensation, sometimes accompanied by itching, but without any associated rash. A strain at work while lifting something is recalled, but no specific injury or fall is remembered. Pain management attempts with Tylenol and Advil have not provided relief. Pain is rated as a 5 or 6 on a scale of 10.    Stool issues and extreme hunger have been addressed by a GI doctor. Uncontrolled eating is reported, with a sensation of never getting full, leading to shakiness and a need to keep eating. Blood sugar tests have not indicated significant issues. A high water intake is noted, with a 40-ounce bottle filled 3 to 4 times a day at work and additional water consumed with  "dinner at home. Stool is described as broken up, sometimes hard, and abnormal in appearance with white bumps, red dots, and green dots. These symptoms have persisted for months.    Review of Systems   Constitutional:  Negative for chills, fever, malaise/fatigue and weight loss.   Respiratory:  Negative for cough and shortness of breath.    Cardiovascular:  Negative for chest pain.   Gastrointestinal:  Negative for abdominal pain, constipation, heartburn, nausea and vomiting.   Skin:  Negative for rash.   Neurological:  Positive for dizziness.       Objective:     Exam:  /68 (BP Location: Left arm, Patient Position: Sitting, BP Cuff Size: Adult)   Pulse (!) 52   Temp 36.4 °C (97.6 °F) (Temporal)   Resp 16   Ht 1.854 m (6' 1\")   Wt 104 kg (229 lb 14.4 oz)   SpO2 99%   BMI 30.33 kg/m²  Body mass index is 30.33 kg/m².    Physical Exam  Vitals reviewed.   Constitutional:       General: He is not in acute distress.     Appearance: Normal appearance. He is not ill-appearing.   HENT:      Head: Normocephalic and atraumatic.      Nose: Nose normal.      Mouth/Throat:      Mouth: Mucous membranes are moist.   Eyes:      General: No scleral icterus.  Cardiovascular:      Rate and Rhythm: Regular rhythm. Bradycardia present.      Heart sounds: Normal heart sounds. No murmur heard.  Pulmonary:      Effort: Pulmonary effort is normal. No respiratory distress.      Breath sounds: Normal breath sounds. No wheezing, rhonchi or rales.   Chest:      Chest wall: No mass, lacerations, tenderness, crepitus or edema.   Abdominal:      General: Abdomen is flat. Bowel sounds are normal. There is no distension.      Palpations: Abdomen is soft. There is no mass.      Tenderness: There is no abdominal tenderness. There is no guarding or rebound.      Hernia: No hernia is present.   Musculoskeletal:      Cervical back: Normal range of motion and neck supple.      Right lower leg: No edema.      Left lower leg: No edema.   Skin:    "  General: Skin is warm and dry.      Coloration: Skin is not jaundiced.      Findings: No rash. Rash is not vesicular.   Neurological:      General: No focal deficit present.      Mental Status: He is alert and oriented to person, place, and time.      Cranial Nerves: No cranial nerve deficit.   Psychiatric:         Mood and Affect: Mood normal.         Behavior: Behavior normal.         Thought Content: Thought content normal.         Assessment & Plan:     34 y.o. male with the following -     1. Intermittent lightheadedness  2. Bradycardia  Chronic.  Followed by cardiology who ordered echocardiogram which is scheduled in June.  - Blood pressure readings at baseline and with orthostatic blood pressures were normal.  -He does have a baseline low heart rate, no evidence of POTS on his orthostatic testing.  Plan on 14-day monitor to evaluate for arrhythmia contributing to the symptoms.  - Advised to maintain regular meal times, ensure adequate hydration, and avoid sudden transitions from sitting to standing. Compression stockings recommended during periods of prolonged standing.  - Thyroid function test will be conducted today as otherwise he has had a thorough evaluation.  - Reports extreme hunger and feeling shaky if not eating frequently, advised to monitor blood sugar when symptomatic using an over-the-counter glucose monitor.  - Orthostatic Blood Pressure  - [facility] ZIO PATCH MONITOR; Future  - TSH WITH REFLEX TO FT4; Future    3. Rib pain  4. Neuropathic pain  Chronic, chest x-ray showed no bony abnormality in area of concern.  - Pain appears to be neuropathic in nature.  - CT scan conducted three months ago revealed a healthy liver and gallbladder, with no evidence of gallstones.  He is still concerned about another etiology of the pain and requests repeat imaging of the liver/gallbladder, plan on right upper quadrant ultrasound.  - Advised to apply topical treatments such as lidocaine, capsaicin, or Voltaren to  the affected area.  - Discussed treating for neuropathic pain with gabapentin 300 mg up to three times a day but at this time he prefers to defer pending results.  - US-RUQ; Future    I spent a total of 22 minutes with record review, exam, communication with the patient, and documentation of this encounter.    Return if symptoms worsen or fail to improve.    Please note that this dictation was created using voice recognition software. I have made every reasonable attempt to correct obvious errors, but I expect that there are errors of grammar and possibly content that I did not discover before finalizing the note.

## 2025-04-30 ENCOUNTER — TELEPHONE (OUTPATIENT)
Dept: HEALTH INFORMATION MANAGEMENT | Facility: OTHER | Age: 35
End: 2025-04-30
Payer: COMMERCIAL

## 2025-05-02 ENCOUNTER — HOSPITAL ENCOUNTER (OUTPATIENT)
Dept: RADIOLOGY | Facility: MEDICAL CENTER | Age: 35
End: 2025-05-02
Attending: STUDENT IN AN ORGANIZED HEALTH CARE EDUCATION/TRAINING PROGRAM
Payer: COMMERCIAL

## 2025-05-02 DIAGNOSIS — R07.81 RIB PAIN: ICD-10-CM

## 2025-05-02 PROCEDURE — 76705 ECHO EXAM OF ABDOMEN: CPT

## 2025-05-05 ENCOUNTER — RESULTS FOLLOW-UP (OUTPATIENT)
Dept: MEDICAL GROUP | Facility: LAB | Age: 35
End: 2025-05-05

## 2025-05-07 ENCOUNTER — APPOINTMENT (OUTPATIENT)
Dept: RADIOLOGY | Facility: MEDICAL CENTER | Age: 35
End: 2025-05-07
Attending: EMERGENCY MEDICINE
Payer: COMMERCIAL

## 2025-05-07 ENCOUNTER — HOSPITAL ENCOUNTER (EMERGENCY)
Facility: MEDICAL CENTER | Age: 35
End: 2025-05-07
Attending: EMERGENCY MEDICINE
Payer: COMMERCIAL

## 2025-05-07 VITALS
HEIGHT: 73 IN | OXYGEN SATURATION: 96 % | BODY MASS INDEX: 30.85 KG/M2 | TEMPERATURE: 98.2 F | RESPIRATION RATE: 16 BRPM | WEIGHT: 232.81 LBS | HEART RATE: 56 BPM | DIASTOLIC BLOOD PRESSURE: 79 MMHG | SYSTOLIC BLOOD PRESSURE: 138 MMHG

## 2025-05-07 DIAGNOSIS — R20.2 ARM PARESTHESIA, LEFT: ICD-10-CM

## 2025-05-07 DIAGNOSIS — H57.11 ACUTE RIGHT EYE PAIN: ICD-10-CM

## 2025-05-07 LAB
ANION GAP SERPL CALC-SCNC: 11 MMOL/L (ref 7–16)
BASOPHILS # BLD AUTO: 0.1 % (ref 0–1.8)
BASOPHILS # BLD: 0.01 K/UL (ref 0–0.12)
BUN SERPL-MCNC: 19 MG/DL (ref 8–22)
CALCIUM SERPL-MCNC: 9.4 MG/DL (ref 8.5–10.5)
CHLORIDE SERPL-SCNC: 105 MMOL/L (ref 96–112)
CO2 SERPL-SCNC: 22 MMOL/L (ref 20–33)
CREAT SERPL-MCNC: 1.09 MG/DL (ref 0.5–1.4)
EOSINOPHIL # BLD AUTO: 0.08 K/UL (ref 0–0.51)
EOSINOPHIL NFR BLD: 1.2 % (ref 0–6.9)
ERYTHROCYTE [DISTWIDTH] IN BLOOD BY AUTOMATED COUNT: 40.8 FL (ref 35.9–50)
GFR SERPLBLD CREATININE-BSD FMLA CKD-EPI: 91 ML/MIN/1.73 M 2
GLUCOSE SERPL-MCNC: 93 MG/DL (ref 65–99)
HCT VFR BLD AUTO: 42.6 % (ref 42–52)
HGB BLD-MCNC: 13.9 G/DL (ref 14–18)
IMM GRANULOCYTES # BLD AUTO: 0.03 K/UL (ref 0–0.11)
IMM GRANULOCYTES NFR BLD AUTO: 0.4 % (ref 0–0.9)
LYMPHOCYTES # BLD AUTO: 2.22 K/UL (ref 1–4.8)
LYMPHOCYTES NFR BLD: 32.3 % (ref 22–41)
MCH RBC QN AUTO: 29.6 PG (ref 27–33)
MCHC RBC AUTO-ENTMCNC: 32.6 G/DL (ref 32.3–36.5)
MCV RBC AUTO: 90.8 FL (ref 81.4–97.8)
MONOCYTES # BLD AUTO: 0.48 K/UL (ref 0–0.85)
MONOCYTES NFR BLD AUTO: 7 % (ref 0–13.4)
NEUTROPHILS # BLD AUTO: 4.05 K/UL (ref 1.82–7.42)
NEUTROPHILS NFR BLD: 59 % (ref 44–72)
NRBC # BLD AUTO: 0 K/UL
NRBC BLD-RTO: 0 /100 WBC (ref 0–0.2)
PLATELET # BLD AUTO: 171 K/UL (ref 164–446)
PMV BLD AUTO: 11.7 FL (ref 9–12.9)
POTASSIUM SERPL-SCNC: 4 MMOL/L (ref 3.6–5.5)
RBC # BLD AUTO: 4.69 M/UL (ref 4.7–6.1)
SODIUM SERPL-SCNC: 138 MMOL/L (ref 135–145)
TROPONIN T SERPL-MCNC: <6 NG/L (ref 6–19)
WBC # BLD AUTO: 6.9 K/UL (ref 4.8–10.8)

## 2025-05-07 PROCEDURE — 700101 HCHG RX REV CODE 250: Performed by: EMERGENCY MEDICINE

## 2025-05-07 PROCEDURE — 36415 COLL VENOUS BLD VENIPUNCTURE: CPT

## 2025-05-07 PROCEDURE — 700102 HCHG RX REV CODE 250 W/ 637 OVERRIDE(OP): Performed by: EMERGENCY MEDICINE

## 2025-05-07 PROCEDURE — 700117 HCHG RX CONTRAST REV CODE 255: Performed by: EMERGENCY MEDICINE

## 2025-05-07 PROCEDURE — 93005 ELECTROCARDIOGRAM TRACING: CPT | Mod: TC | Performed by: EMERGENCY MEDICINE

## 2025-05-07 PROCEDURE — A9270 NON-COVERED ITEM OR SERVICE: HCPCS | Performed by: EMERGENCY MEDICINE

## 2025-05-07 PROCEDURE — 80048 BASIC METABOLIC PNL TOTAL CA: CPT

## 2025-05-07 PROCEDURE — 70496 CT ANGIOGRAPHY HEAD: CPT

## 2025-05-07 PROCEDURE — 85025 COMPLETE CBC W/AUTO DIFF WBC: CPT

## 2025-05-07 PROCEDURE — 70498 CT ANGIOGRAPHY NECK: CPT

## 2025-05-07 PROCEDURE — 84484 ASSAY OF TROPONIN QUANT: CPT

## 2025-05-07 PROCEDURE — 99284 EMERGENCY DEPT VISIT MOD MDM: CPT

## 2025-05-07 RX ORDER — PROPARACAINE HYDROCHLORIDE 5 MG/ML
1 SOLUTION/ DROPS OPHTHALMIC ONCE
Status: COMPLETED | OUTPATIENT
Start: 2025-05-07 | End: 2025-05-07

## 2025-05-07 RX ORDER — ACETAMINOPHEN 500 MG
1000 TABLET ORAL ONCE
Status: COMPLETED | OUTPATIENT
Start: 2025-05-07 | End: 2025-05-07

## 2025-05-07 RX ADMIN — PROPARACAINE HYDROCHLORIDE 1 DROP: 5 SOLUTION/ DROPS OPHTHALMIC at 17:32

## 2025-05-07 RX ADMIN — IBUPROFEN 800 MG: 600 TABLET ORAL at 20:23

## 2025-05-07 RX ADMIN — IOHEXOL 80 ML: 350 INJECTION, SOLUTION INTRAVENOUS at 20:30

## 2025-05-07 RX ADMIN — ACETAMINOPHEN 1000 MG: 500 TABLET ORAL at 20:22

## 2025-05-07 RX ADMIN — FLUORESCEIN SODIUM 1 MG: 1 STRIP OPHTHALMIC at 17:31

## 2025-05-07 ASSESSMENT — FIBROSIS 4 INDEX: FIB4 SCORE: 0.97

## 2025-05-07 NOTE — ED PROVIDER NOTES
ED Provider Note    CHIEF COMPLAINT  Chief Complaint   Patient presents with    Arm Pain     Reports left arm numbness/tingling and shooting pain since yesterday. Denies trauma.     Headache     Reports right eye pain/dryness, and a right sided headache since he woke up yesterday.          HPI/ROS  LIMITATION TO HISTORY   Select: : None  OUTSIDE HISTORIAN(S):  nader    Waldemar Ohara is a 34 y.o. male who presents to the emergency department with 2 complaints.  1 stating that he has a history of migraines and sometimes gets pain behind his right eye before he gets a migraine in the last few days has had there is recurrent pain in the right eye feels like it is really dry but does not have a lot of vision changes to the eye.  The other is that he had this numbness tingling shooting pain down his left arm only.  It is not really chest pain but it goes down the left arm and just feels like heavy at times.  Does not involve his face does not involve his leg he has no neck discomfort and he states the 2 things were just kind of freaking him out which is why he came in.    PAST MEDICAL HISTORY   has a past medical history of COVID.    SURGICAL HISTORY   has a past surgical history that includes no pertinent past surgical history.    FAMILY HISTORY  Family History   Problem Relation Age of Onset    No Known Problems Mother     Hypertension Father     Hyperlipidemia Father     Hypertension Brother     Obesity Brother     Cancer Maternal Grandmother         ovarian    No Known Problems Maternal Grandfather     Cancer Paternal Grandmother         leukemia    No Known Problems Paternal Grandfather     Heart Attack Neg Hx     Heart Disease Neg Hx        SOCIAL HISTORY  Social History     Tobacco Use    Smoking status: Never    Smokeless tobacco: Never   Vaping Use    Vaping status: Never Used   Substance and Sexual Activity    Alcohol use: Not Currently     Comment: rare    Drug use: Never    Sexual activity: Not Currently  "    Partners: Female     Birth control/protection: None       CURRENT MEDICATIONS  Home Medications       Reviewed by Sophie Pruett R.N. (Registered Nurse) on 05/07/25 at 1447  Med List Status: Partial     Medication Last Dose Status   multivitamin Tab  Active   naproxen (NAPROSYN) 500 MG Tab  Active                    ALLERGIES  No Known Allergies    PHYSICAL EXAM  VITAL SIGNS: BP (!) 161/75   Pulse 72   Temp 36.8 °C (98.2 °F) (Temporal)   Resp 14   Ht 1.854 m (6' 1\")   Wt 106 kg (232 lb 12.9 oz)   SpO2 98%   BMI 30.71 kg/m²    Pulse OX: Pulse Oxygen level is normal on room air  Constitutional: Alert in no apparent distress.  HENT: Normocephalic, Atraumatic, MMM  Eyes: PERRL 4 b/l Conjunctiva normal, non-icteric.   Visual Acuities:  OS (Left Eye): 20/25  OD (Right Eye): 20/25  OU (Both Eyes): 20/15       IOP R 13, 14, 15  Heart: Regular rate and rhythm, intact distal pulses   Lungs: Symmetrical movement, no resp distress   Abdomen: Non-tender, non-distended, normal bowel sounds  EXT/Back no edema  Skin: Warm, Dry, No erythema, No rash.   Neurologic: Alert and oriented, Symmetric smile, eyes shut tight bilaterally, forehead wrinkles bilaterally, sensation intact to light touch bilateral face, tongue midline, head turn and shoulder shrug with full strength. Hearing intact grossly bilaterally. 5/5 strength shoulder, elbow  b/l. 5/5 strength hip, knee, ankle b/l   SILT biceps, forearms, hands, SILT thighs, shins mid foot   NO pronator drift, negative romberg, no aphasia, no dysarthria, no gaze preference or visual deficit         EKG/LABS  Labs Reviewed   CBC WITH DIFFERENTIAL - Abnormal; Notable for the following components:       Result Value    RBC 4.69 (*)     Hemoglobin 13.9 (*)     All other components within normal limits   BASIC METABOLIC PANEL   TROPONIN   ESTIMATED GFR     Results for orders placed or performed during the hospital encounter of 05/07/25   EKG (NOW)   Result Value Ref Range    " Report       [facility] Emergency Dept.    Test Date:  2025  Pt Name:    MAYO BOWSER       Department: ER  MRN:        [id number]                      Room:       BL 22 H  Gender:     Male                         Technician: [id number]  :        1990                   Requested By:REBECCA WHALEY  Order #:    [id number]                    Reading MD: Rebecca Whaley MD    Measurements  Intervals                                Axis  Rate:       65                           P:          63  MD:         176                          QRS:        81  QRSD:       107                          T:          49  QT:         408  QTc:        425    Interpretive Statements  Sinus rhythm at a rate 65 no ST elevations or ST depressions no abnormal T  wave inversions or Q waves normal intervals normal axis  Compared to ECG 2025 23:37:10  Sinus bradycardia no longer present  Electronically Signed On 2025 00:23:26 PDT by Rebecca Whaley MD         I have independently interpreted this EKG    RADIOLOGY/PROCEDURES   I have independently interpreted the diagnostic imaging associated with this visit and am waiting the final reading from the radiologist.   My preliminary interpretation is as follows:     CTahead -no bleed or aneurysm  CTA neck -no dissection or occlusion    Radiologist interpretation:  CT-CTA NECK WITH & W/O-POST PROCESSING   Final Result         1.  CT angiogram of the neck within normal limits.         CT-CTA HEAD WITH & W/O-POST PROCESS   Final Result         1.  No large vessel occlusion or aneurysm identified          COURSE & MEDICAL DECISION MAKING    ASSESSMENT, COURSE AND PLAN  Care Narrative:     Patient is a 34-year-old male presents to the emergency department with 2 different complaints 1 being may be an ocular migraine on the right especially because he states that starts in the eye and then kind of sometimes spread to a headache but more feels directed to the  eye but he denies any vision changes visual acuity with his contacts and is within normal limits his exam is unremarkable his pressure is normal.  This numbness tingling of the left arm could be symptomatic for possible migraine but he states his pain is kind of under control right now could be a nerve discomfort atypical ACS neurologically he is intact with NIH of 0 EKG is unremarkable so troponin was ordered as well as CTA of the head and neck.      DISPOSITION AND DISCUSSIONS  8:33 PM  Patient was treated with Tylenol ibuprofen for headache in the eye and he states that is completely resolved and had a normal pressure normal visual acuity there is no evidence of infection inflammation or any abnormal findings of the right eye.  CTAs of the head and neck are normal laboratory analysis is unremarkable EKG is unchanged his NIH is 0.  He is very low risk factor for anything such as a TIA ACS could be a pinched nerve this could be just symptomatic of a ocular migraine.  He feels a lot better after the Tylenol ibuprofen.  We discussed some Benadryl and ibuprofen tonight before he goes to bed and follow-up with primary care but at this time there is no urgent or emergent need for hospitalization.  He will also be referred to ophthalmology for the right eye.    Heart score was not performed as the patient does not have any chest pain        I have discussed management of the patient with the following physicians and DRE's:  none    Discussion of management with other Q or appropriate source(s): None     Escalation of care considered, and ultimately not performed:acute inpatient care management, however at this time, the patient is most appropriate for outpatient management    Barriers to care at this time, including but not limited to:  na .     Decision tools and prescription drugs considered including, but not limited to: NIH 0.  The patient will return for new or worsening symptoms and is stable at the time of  discharge.    The patient is referred to a primary physician for blood pressure management, diabetic screening, and for all other preventative health concerns.    DISPOSITION:  Patient will be discharged home in stable condition.    FOLLOW UP:  Josiah Che D.O.  [address]  [facility] [address] [phone]    Schedule an appointment as soon as possible for a visit       [facility], Emergency Dept  [address] [facility] [address] [phone]    If symptoms worsen    Efra Hill M.D.  [address] [facility] [address] [phone]    Schedule an appointment as soon as possible for a visit         OUTPATIENT MEDICATIONS:  Discharge Medication List as of 5/7/2025  8:35 PM            FINAL DIAGNOSIS  1. Arm paresthesia, left    2. Acute right eye pain         Electronically signed by: Rebecca Jaquez M.D., 5/7/2025 3:54 PM

## 2025-05-07 NOTE — ED TRIAGE NOTES
".  Chief Complaint   Patient presents with    Arm Pain     Reports left arm numbness/tingling and shooting pain since yesterday. Denies trauma.     Headache     Reports right eye pain/dryness, and a right sided headache since he woke up yesterday.        33 yo male ambulatory to triage for above complaint. No neurological deficits noted in triage.      Pt is alert and oriented, speaking in full sentences, follows commands and responds appropriately to questions.      Patient placed back in lobby and educated on triage process. Asked to inform RN of any changes or concerns.     BP (!) 161/75   Pulse 72   Temp 36.8 °C (98.2 °F) (Temporal)   Resp 14   Ht 1.854 m (6' 1\")   Wt 106 kg (232 lb 12.9 oz)   SpO2 98%   BMI 30.71 kg/m²     "

## 2025-05-08 ENCOUNTER — APPOINTMENT (OUTPATIENT)
Dept: MEDICAL GROUP | Facility: LAB | Age: 35
End: 2025-05-08
Payer: COMMERCIAL

## 2025-05-08 ENCOUNTER — OFFICE VISIT (OUTPATIENT)
Dept: MEDICAL GROUP | Facility: LAB | Age: 35
End: 2025-05-08
Payer: COMMERCIAL

## 2025-05-08 VITALS
HEIGHT: 73 IN | WEIGHT: 234.35 LBS | SYSTOLIC BLOOD PRESSURE: 136 MMHG | RESPIRATION RATE: 16 BRPM | TEMPERATURE: 97.4 F | HEART RATE: 56 BPM | DIASTOLIC BLOOD PRESSURE: 72 MMHG | OXYGEN SATURATION: 98 % | BODY MASS INDEX: 31.06 KG/M2

## 2025-05-08 DIAGNOSIS — R06.83 SNORING: ICD-10-CM

## 2025-05-08 DIAGNOSIS — R20.0 NUMBNESS: ICD-10-CM

## 2025-05-08 LAB — EKG IMPRESSION: NORMAL

## 2025-05-08 PROCEDURE — 99213 OFFICE O/P EST LOW 20 MIN: CPT | Performed by: STUDENT IN AN ORGANIZED HEALTH CARE EDUCATION/TRAINING PROGRAM

## 2025-05-08 PROCEDURE — 3075F SYST BP GE 130 - 139MM HG: CPT | Performed by: STUDENT IN AN ORGANIZED HEALTH CARE EDUCATION/TRAINING PROGRAM

## 2025-05-08 PROCEDURE — 3078F DIAST BP <80 MM HG: CPT | Performed by: STUDENT IN AN ORGANIZED HEALTH CARE EDUCATION/TRAINING PROGRAM

## 2025-05-08 ASSESSMENT — ENCOUNTER SYMPTOMS
WEAKNESS: 0
CHILLS: 0
SHORTNESS OF BREATH: 0
FEVER: 0
TINGLING: 1

## 2025-05-08 ASSESSMENT — FIBROSIS 4 INDEX: FIB4 SCORE: 0.98

## 2025-05-08 NOTE — PROGRESS NOTES
"Subjective:     CC:   Chief Complaint   Patient presents with    Follow-Up        HPI:   Patient visited the ER on 4/21 for chronic bilateral foot numbness and intermittent dizziness.  In the ER he did have a normal BMP and liver enzymes.  CBC was within normal.  A1c was checked and 5.4.  Patient was eventually discharged from the ER.    Patient had arm and hand numbness and visited the ER yesterday. He had CTA head and neck along with an EKG was within normal.     He reports     Current Outpatient Medications Ordered in Epic   Medication Sig Dispense Refill    naproxen (NAPROSYN) 500 MG Tab 1 tab Q12 hrs x 3 days then  as needed thereafter for pain 14 Tablet 0    multivitamin Tab Take 1 Tablet by mouth every day.       No current [facility]-ordered facility-administered medications on file.           ROS:  Review of Systems   Constitutional:  Negative for chills and fever.   Respiratory:  Negative for shortness of breath.    Cardiovascular:  Negative for chest pain.   Neurological:  Positive for tingling. Negative for weakness.       Objective:     Exam:  /72 (BP Location: Right arm, Patient Position: Sitting, BP Cuff Size: Large adult)   Pulse (!) 56   Temp 36.3 °C (97.4 °F) (Temporal)   Resp 16   Ht 1.854 m (6' 1\")   Wt 106 kg (234 lb 5.6 oz)   SpO2 98%   BMI 30.92 kg/m²  Body mass index is 30.92 kg/m².    Physical Exam  Constitutional:       General: He is not in acute distress.     Appearance: He is not ill-appearing.   Pulmonary:      Effort: Pulmonary effort is normal.   Neurological:      Mental Status: He is alert.   Psychiatric:         Mood and Affect: Mood normal.         Behavior: Behavior normal.         Thought Content: Thought content normal.         Judgment: Judgment normal.                   Assessment & Plan:   #Numbess-extremities  #Snoring  -possibly related to VICKI  -TSH pending     Problem List Items Addressed This Visit       Snoring    Chronic    STOP-BANG Score for Obstructive Sleep " Apnea     RESULT SUMMARY:  4 points  STOP-BANG    High   Risk for moderate to severe VICKI      INPUTS:  Do you snore loudly? --> 1 = Yes  Do you often feel tired, fatigued, or sleepy during the daytime? --> 1 = Yes  Has anyone observed you stop breathing during sleep? --> 0 = No  Do you have (or are you being treated for) high blood pressure? --> 0 = No  BMI --> 0 = <=35 kg/m²  Age --> 0 = <=50 years  Neck circumference --> 1 = >40 cm  Gender --> 1 = Male           Relevant Orders    Overnight Home Sleep Study     Other Visit Diagnoses         Numbness        Relevant Orders    Sed Rate    CRP QUANTITIVE (NON-CARDIAC)            ER notes and labs and imaging reviewed       Please note that this dictation was created using voice recognition software. I have made every reasonable attempt to correct obvious errors, but I expect that there are errors of grammar and possibly content that I did not discover before finalizing the note.

## 2025-05-08 NOTE — ASSESSMENT & PLAN NOTE
Chronic    STOP-BANG Score for Obstructive Sleep Apnea     RESULT SUMMARY:  4 points  STOP-BANG    High   Risk for moderate to severe VICKI      INPUTS:  Do you snore loudly? --> 1 = Yes  Do you often feel tired, fatigued, or sleepy during the daytime? --> 1 = Yes  Has anyone observed you stop breathing during sleep? --> 0 = No  Do you have (or are you being treated for) high blood pressure? --> 0 = No  BMI --> 0 = <=35 kg/m²  Age --> 0 = <=50 years  Neck circumference --> 1 = >40 cm  Gender --> 1 = Male

## 2025-05-08 NOTE — ED NOTES
PIV removed for dc. Written and verbal instructions provided to pt. Pt instructed to follow up with PCP and ophthalmology. Pt instructed to return to emergency department for new or worsening symptoms. Pt verbalized understanding of discharge instructions. Pt ambulatory upon discharge with all belongings.

## 2025-05-09 ENCOUNTER — NON-PROVIDER VISIT (OUTPATIENT)
Dept: CARDIOLOGY | Facility: MEDICAL CENTER | Age: 35
End: 2025-05-09
Attending: STUDENT IN AN ORGANIZED HEALTH CARE EDUCATION/TRAINING PROGRAM
Payer: COMMERCIAL

## 2025-05-09 DIAGNOSIS — R00.1 BRADYCARDIA: ICD-10-CM

## 2025-05-09 DIAGNOSIS — R42 INTERMITTENT LIGHTHEADEDNESS: ICD-10-CM

## 2025-05-09 PROCEDURE — 93226 XTRNL ECG REC<48 HR SCAN A/R: CPT | Mod: TC

## 2025-05-09 NOTE — PROGRESS NOTES
Patient enrolled in the 14 day o Holter monitoring program per Alize Dow DO.  >Office hook-up, serial # [id number].  >Currently pending EOS.

## 2025-05-12 NOTE — Clinical Note
REFERRAL APPROVAL NOTICE         Sent on May 12, 2025                   Waldemar Ohara  [address] [place] [id number]                   Dear Mr. Josh Ohara,    After a careful review of the medical information and benefit coverage, [facility] has processed your referral. See below for additional details.    If applicable, you must be actively enrolled with your insurance for coverage of the authorized service. If you have any questions regarding your coverage, please contact your insurance directly.    REFERRAL INFORMATION   Referral #:  [id number]  Referred-To Department    Referred-By Provider:  Pulmonary and Sleep Medicine    Josiah Che D.O.   [facility]      [address]  [place] [address]  [phone]  [place] [id number] [phone]    Referral Start Date:  05/08/2025  Referral End Date:   05/08/2026             SCHEDULING  If you do not already have an appointment, please call [phone] to make an appointment.     MORE INFORMATION  If you do not already have a [employer] account, sign up at: [email or web address]  You can access your medical information, make appointments, see lab results, billing information, and more.  If you have questions regarding this referral, please contact  the [facility] Referrals department at:             [phone]. Monday - Friday 8:00AM - 5:00PM.     Sincerely,    [facility]

## 2025-05-14 ENCOUNTER — HOSPITAL ENCOUNTER (OUTPATIENT)
Facility: MEDICAL CENTER | Age: 35
End: 2025-05-14
Attending: STUDENT IN AN ORGANIZED HEALTH CARE EDUCATION/TRAINING PROGRAM
Payer: COMMERCIAL

## 2025-05-14 DIAGNOSIS — R00.1 BRADYCARDIA: ICD-10-CM

## 2025-05-14 DIAGNOSIS — R20.0 NUMBNESS: ICD-10-CM

## 2025-05-14 DIAGNOSIS — R42 INTERMITTENT LIGHTHEADEDNESS: ICD-10-CM

## 2025-05-14 LAB
CRP SERPL HS-MCNC: <0.3 MG/DL (ref 0–0.75)
ERYTHROCYTE [SEDIMENTATION RATE] IN BLOOD BY WESTERGREN METHOD: 3 MM/HOUR (ref 0–20)
TSH SERPL DL<=0.005 MIU/L-ACNC: 1.64 UIU/ML (ref 0.38–5.33)

## 2025-05-14 PROCEDURE — 86140 C-REACTIVE PROTEIN: CPT

## 2025-05-14 PROCEDURE — 36415 COLL VENOUS BLD VENIPUNCTURE: CPT

## 2025-05-14 PROCEDURE — 84443 ASSAY THYROID STIM HORMONE: CPT

## 2025-05-14 PROCEDURE — 85652 RBC SED RATE AUTOMATED: CPT

## 2025-05-14 NOTE — Clinical Note
REFERRAL APPROVAL NOTICE         Sent on May 14, 2025                   Waldemar Ohara  [address] [place] [address]                   Dear Mr. Josh Ohara,    After a careful review of the medical information and benefit coverage, [facility] has processed your referral. See below for additional details.    If applicable, you must be actively enrolled with your insurance for coverage of the authorized service. If you have any questions regarding your coverage, please contact your insurance directly.    REFERRAL INFORMATION   Referral #:  [id number]  Referred-To Provider    Referred-By Provider:  Ophthalmology    Rebecca Jaquez M.D.   [facility]      [address] [facility] [address]  [address]  [facility] [address]  [phone] [address] [facility] [address]  [phone]    Referral Start Date:  05/07/2025  Referral End Date:   05/07/2026             SCHEDULING  If you do not already have an appointment, please call [phone] to make an appointment.     MORE INFORMATION  If you do not already have a [employer] account, sign up at: [email or web address]  You can access your medical information, make appointments, see lab results, billing information, and more.  If you have questions regarding this referral, please contact  the [facility] Referrals department at:             [phone]. Monday - Friday 8:00AM - 5:00PM.     Sincerely,    [facility]

## 2025-05-15 ENCOUNTER — RESULTS FOLLOW-UP (OUTPATIENT)
Dept: MEDICAL GROUP | Facility: LAB | Age: 35
End: 2025-05-15

## 2025-05-17 ENCOUNTER — RESULTS FOLLOW-UP (OUTPATIENT)
Dept: MEDICAL GROUP | Facility: LAB | Age: 35
End: 2025-05-17

## 2025-05-28 ENCOUNTER — TELEPHONE (OUTPATIENT)
Dept: CARDIOLOGY | Facility: MEDICAL CENTER | Age: 35
End: 2025-05-28
Payer: COMMERCIAL

## 2025-06-05 ENCOUNTER — APPOINTMENT (OUTPATIENT)
Dept: SLEEP MEDICINE | Facility: MEDICAL CENTER | Age: 35
End: 2025-06-05
Attending: STUDENT IN AN ORGANIZED HEALTH CARE EDUCATION/TRAINING PROGRAM
Payer: COMMERCIAL

## 2025-06-05 ENCOUNTER — OFFICE VISIT (OUTPATIENT)
Dept: URGENT CARE | Facility: PHYSICIAN GROUP | Age: 35
End: 2025-06-05
Payer: COMMERCIAL

## 2025-06-05 VITALS
OXYGEN SATURATION: 97 % | HEIGHT: 73 IN | DIASTOLIC BLOOD PRESSURE: 66 MMHG | WEIGHT: 227 LBS | SYSTOLIC BLOOD PRESSURE: 112 MMHG | HEART RATE: 56 BPM | TEMPERATURE: 97.5 F | RESPIRATION RATE: 12 BRPM | BODY MASS INDEX: 30.09 KG/M2

## 2025-06-05 DIAGNOSIS — K59.00 CONSTIPATION, UNSPECIFIED CONSTIPATION TYPE: ICD-10-CM

## 2025-06-05 DIAGNOSIS — R10.11 RIGHT UPPER QUADRANT ABDOMINAL PAIN: Primary | ICD-10-CM

## 2025-06-05 LAB
APPEARANCE UR: CLEAR
BILIRUB UR STRIP-MCNC: NEGATIVE MG/DL
COLOR UR AUTO: YELLOW
GLUCOSE UR STRIP.AUTO-MCNC: NEGATIVE MG/DL
KETONES UR STRIP.AUTO-MCNC: NEGATIVE MG/DL
LEUKOCYTE ESTERASE UR QL STRIP.AUTO: NEGATIVE
NITRITE UR QL STRIP.AUTO: NEGATIVE
PH UR STRIP.AUTO: 6 [PH] (ref 5–8)
PROT UR QL STRIP: NEGATIVE MG/DL
RBC UR QL AUTO: NEGATIVE
SP GR UR STRIP.AUTO: 1.02
UROBILINOGEN UR STRIP-MCNC: 0.2 MG/DL

## 2025-06-05 PROCEDURE — 3074F SYST BP LT 130 MM HG: CPT | Performed by: FAMILY MEDICINE

## 2025-06-05 PROCEDURE — 81002 URINALYSIS NONAUTO W/O SCOPE: CPT | Performed by: FAMILY MEDICINE

## 2025-06-05 PROCEDURE — 3078F DIAST BP <80 MM HG: CPT | Performed by: FAMILY MEDICINE

## 2025-06-05 PROCEDURE — 99214 OFFICE O/P EST MOD 30 MIN: CPT | Performed by: FAMILY MEDICINE

## 2025-06-05 ASSESSMENT — FIBROSIS 4 INDEX: FIB4 SCORE: 0.98

## 2025-06-05 NOTE — PROGRESS NOTES
"  Subjective:      34 y.o. male presents to urgent care for right sided rib pain that started a couple of months ago. There was no inciting event or trauma at that time. He had a RUQ abdominal ultrasound 5/2/2025 that showed no acute issues. His pain has been intermittent and comes daily, he's unsure of aggravating factors, it's described as hot and stabbing, currently rated 3/10. He has tried tylenol and ibuprofen without significant change in symptoms. No associated nausea, vomiting, or shortness of breath. Last bowel movement was this morning and very hard and small. No changes to urinary urgency, frequency, dysuria, or hematuria. But he does endorse foamy urine.     He denies any other questions or concerns at this time.    Current problem list, medication, and past medical/surgical history were reviewed in Epic.    ROS  See HPI     Objective:      /66 (BP Location: Right arm, Patient Position: Sitting, BP Cuff Size: Large adult)   Pulse (!) 56   Temp 36.4 °C (97.5 °F) (Temporal)   Resp 12   Ht 1.854 m (6' 1\")   Wt 103 kg (227 lb)   SpO2 97%   BMI 29.95 kg/m²     Physical Exam  Constitutional:       General: He is not in acute distress.     Appearance: He is not diaphoretic.   Cardiovascular:      Rate and Rhythm: Regular rhythm. Bradycardia present.      Heart sounds: Normal heart sounds.   Pulmonary:      Effort: Pulmonary effort is normal. No respiratory distress.      Breath sounds: Normal breath sounds.   Abdominal:      General: Bowel sounds are normal.      Palpations: Abdomen is soft.      Tenderness: There is no abdominal tenderness. There is no right CVA tenderness or left CVA tenderness.      Comments: No discolorations or deformities noted to inspection of chest wall or abdomen.  Not tender to palpation of chest wall or abdomen.   Neurological:      Mental Status: He is alert.   Psychiatric:         Mood and Affect: Affect normal.         Judgment: Judgment normal.       Assessment/Plan: "     1. Right upper quadrant abdominal pain (Primary)  2. Constipation, unspecified constipation type  Right upper quadrant abdominal sound from May was reviewed.  No acute processes.  Urinalysis showing no signs of infection or renal calculi.  He was encouraged to add MiraLAX daily.  Continue to follow with GI.  - POCT Urinalysis      Instructed to return to Urgent Care or nearest Emergency Department if symptoms fail to improve, for any change in condition, further concerns, or new concerning symptoms. Patient states understanding of the plan of care and discharge instructions.    Lelia Faustin M.D.

## 2025-06-10 ENCOUNTER — TELEPHONE (OUTPATIENT)
Dept: HEALTH INFORMATION MANAGEMENT | Facility: OTHER | Age: 35
End: 2025-06-10
Payer: COMMERCIAL

## 2025-06-13 ENCOUNTER — HOSPITAL ENCOUNTER (OUTPATIENT)
Dept: CARDIOLOGY | Facility: MEDICAL CENTER | Age: 35
End: 2025-06-13
Attending: INTERNAL MEDICINE
Payer: COMMERCIAL

## 2025-06-13 DIAGNOSIS — R94.31 ABNORMAL ECG: ICD-10-CM

## 2025-06-13 PROCEDURE — 93306 TTE W/DOPPLER COMPLETE: CPT

## 2025-06-14 ENCOUNTER — RESULTS FOLLOW-UP (OUTPATIENT)
Dept: CARDIOLOGY | Facility: MEDICAL CENTER | Age: 35
End: 2025-06-14

## 2025-06-14 LAB
LV EJECT FRACT  99904: 65
LV EJECT FRACT MOD 2C 99903: 66.67
LV EJECT FRACT MOD 4C 99902: 58.17
LV EJECT FRACT MOD BP 99901: 62.58

## 2025-06-14 PROCEDURE — 93306 TTE W/DOPPLER COMPLETE: CPT | Mod: 26 | Performed by: INTERNAL MEDICINE

## 2025-06-17 DIAGNOSIS — R07.89 ATYPICAL CHEST PAIN: Primary | ICD-10-CM

## 2025-07-03 ENCOUNTER — APPOINTMENT (OUTPATIENT)
Dept: RADIOLOGY | Facility: MEDICAL CENTER | Age: 35
End: 2025-07-03
Attending: STUDENT IN AN ORGANIZED HEALTH CARE EDUCATION/TRAINING PROGRAM
Payer: COMMERCIAL

## 2025-07-05 ENCOUNTER — HOSPITAL ENCOUNTER (EMERGENCY)
Facility: MEDICAL CENTER | Age: 35
End: 2025-07-05
Attending: EMERGENCY MEDICINE
Payer: COMMERCIAL

## 2025-07-05 VITALS
BODY MASS INDEX: 30.42 KG/M2 | OXYGEN SATURATION: 94 % | HEIGHT: 73 IN | HEART RATE: 53 BPM | WEIGHT: 229.5 LBS | RESPIRATION RATE: 16 BRPM | DIASTOLIC BLOOD PRESSURE: 72 MMHG | SYSTOLIC BLOOD PRESSURE: 132 MMHG | TEMPERATURE: 97.7 F

## 2025-07-05 DIAGNOSIS — R07.89 RIGHT-SIDED CHEST WALL PAIN: Primary | ICD-10-CM

## 2025-07-05 DIAGNOSIS — R10.9 RIGHT FLANK PAIN: ICD-10-CM

## 2025-07-05 LAB
ALBUMIN SERPL BCP-MCNC: 4.5 G/DL (ref 3.2–4.9)
ALBUMIN/GLOB SERPL: 1.6 G/DL
ALP SERPL-CCNC: 74 U/L (ref 30–99)
ALT SERPL-CCNC: 16 U/L (ref 2–50)
ANION GAP SERPL CALC-SCNC: 12 MMOL/L (ref 7–16)
APPEARANCE UR: ABNORMAL
AST SERPL-CCNC: 22 U/L (ref 12–45)
BACTERIA #/AREA URNS HPF: NORMAL /HPF
BASOPHILS # BLD AUTO: 0.6 % (ref 0–1.8)
BASOPHILS # BLD: 0.04 K/UL (ref 0–0.12)
BILIRUB SERPL-MCNC: 0.6 MG/DL (ref 0.1–1.5)
BILIRUB UR QL STRIP.AUTO: NEGATIVE
BUN SERPL-MCNC: 17 MG/DL (ref 8–22)
CALCIUM ALBUM COR SERPL-MCNC: 9.2 MG/DL (ref 8.5–10.5)
CALCIUM SERPL-MCNC: 9.6 MG/DL (ref 8.5–10.5)
CASTS URNS QL MICRO: NORMAL /LPF (ref 0–2)
CHLORIDE SERPL-SCNC: 104 MMOL/L (ref 96–112)
CO2 SERPL-SCNC: 22 MMOL/L (ref 20–33)
COLOR UR: YELLOW
CREAT SERPL-MCNC: 1.18 MG/DL (ref 0.5–1.4)
D DIMER PPP IA.FEU-MCNC: <0.27 UG/ML (FEU) (ref 0–0.5)
EKG IMPRESSION: NORMAL
EOSINOPHIL # BLD AUTO: 0.14 K/UL (ref 0–0.51)
EOSINOPHIL NFR BLD: 1.9 % (ref 0–6.9)
EPITHELIAL CELLS 1715: NORMAL /HPF (ref 0–5)
ERYTHROCYTE [DISTWIDTH] IN BLOOD BY AUTOMATED COUNT: 39.4 FL (ref 35.9–50)
GFR SERPLBLD CREATININE-BSD FMLA CKD-EPI: 82 ML/MIN/1.73 M 2
GLOBULIN SER CALC-MCNC: 2.9 G/DL (ref 1.9–3.5)
GLUCOSE SERPL-MCNC: 121 MG/DL (ref 65–99)
GLUCOSE UR STRIP.AUTO-MCNC: NEGATIVE MG/DL
HCT VFR BLD AUTO: 43.2 % (ref 42–52)
HGB BLD-MCNC: 14.7 G/DL (ref 14–18)
IMM GRANULOCYTES # BLD AUTO: 0.04 K/UL (ref 0–0.11)
IMM GRANULOCYTES NFR BLD AUTO: 0.6 % (ref 0–0.9)
KETONES UR STRIP.AUTO-MCNC: NEGATIVE MG/DL
LEUKOCYTE ESTERASE UR QL STRIP.AUTO: NEGATIVE
LIPASE SERPL-CCNC: 38 U/L (ref 11–82)
LYMPHOCYTES # BLD AUTO: 3.03 K/UL (ref 1–4.8)
LYMPHOCYTES NFR BLD: 42.1 % (ref 22–41)
MCH RBC QN AUTO: 29.9 PG (ref 27–33)
MCHC RBC AUTO-ENTMCNC: 34 G/DL (ref 32.3–36.5)
MCV RBC AUTO: 87.8 FL (ref 81.4–97.8)
MICRO URNS: ABNORMAL
MONOCYTES # BLD AUTO: 0.61 K/UL (ref 0–0.85)
MONOCYTES NFR BLD AUTO: 8.5 % (ref 0–13.4)
NEUTROPHILS # BLD AUTO: 3.33 K/UL (ref 1.82–7.42)
NEUTROPHILS NFR BLD: 46.3 % (ref 44–72)
NITRITE UR QL STRIP.AUTO: NEGATIVE
NRBC # BLD AUTO: 0 K/UL
NRBC BLD-RTO: 0 /100 WBC (ref 0–0.2)
PH UR STRIP.AUTO: 8 [PH] (ref 5–8)
PLATELET # BLD AUTO: 164 K/UL (ref 164–446)
PMV BLD AUTO: 11.7 FL (ref 9–12.9)
POTASSIUM SERPL-SCNC: 4.2 MMOL/L (ref 3.6–5.5)
PROT SERPL-MCNC: 7.4 G/DL (ref 6–8.2)
PROT UR QL STRIP: NEGATIVE MG/DL
RBC # BLD AUTO: 4.92 M/UL (ref 4.7–6.1)
RBC # URNS HPF: NORMAL /HPF (ref 0–2)
RBC UR QL AUTO: NEGATIVE
SODIUM SERPL-SCNC: 138 MMOL/L (ref 135–145)
SP GR UR STRIP.AUTO: 1.02
UROBILINOGEN UR STRIP.AUTO-MCNC: 0.2 EU/DL
WBC # BLD AUTO: 7.2 K/UL (ref 4.8–10.8)
WBC #/AREA URNS HPF: NORMAL /HPF

## 2025-07-05 PROCEDURE — 81001 URINALYSIS AUTO W/SCOPE: CPT

## 2025-07-05 PROCEDURE — 83690 ASSAY OF LIPASE: CPT

## 2025-07-05 PROCEDURE — 36415 COLL VENOUS BLD VENIPUNCTURE: CPT

## 2025-07-05 PROCEDURE — 85025 COMPLETE CBC W/AUTO DIFF WBC: CPT

## 2025-07-05 PROCEDURE — 99283 EMERGENCY DEPT VISIT LOW MDM: CPT

## 2025-07-05 PROCEDURE — 85379 FIBRIN DEGRADATION QUANT: CPT

## 2025-07-05 PROCEDURE — 93005 ELECTROCARDIOGRAM TRACING: CPT | Mod: TC | Performed by: EMERGENCY MEDICINE

## 2025-07-05 PROCEDURE — 93005 ELECTROCARDIOGRAM TRACING: CPT | Mod: TC

## 2025-07-05 PROCEDURE — 80053 COMPREHEN METABOLIC PANEL: CPT

## 2025-07-05 ASSESSMENT — FIBROSIS 4 INDEX: FIB4 SCORE: 1.01

## 2025-07-06 ENCOUNTER — APPOINTMENT (OUTPATIENT)
Dept: URGENT CARE | Facility: PHYSICIAN GROUP | Age: 35
End: 2025-07-06
Payer: COMMERCIAL

## 2025-07-06 ENCOUNTER — OFFICE VISIT (OUTPATIENT)
Dept: URGENT CARE | Facility: PHYSICIAN GROUP | Age: 35
End: 2025-07-06
Payer: COMMERCIAL

## 2025-07-06 ENCOUNTER — HOSPITAL ENCOUNTER (OUTPATIENT)
Dept: RADIOLOGY | Facility: MEDICAL CENTER | Age: 35
End: 2025-07-06
Payer: COMMERCIAL

## 2025-07-06 VITALS
HEIGHT: 73 IN | SYSTOLIC BLOOD PRESSURE: 122 MMHG | HEART RATE: 56 BPM | DIASTOLIC BLOOD PRESSURE: 68 MMHG | WEIGHT: 227 LBS | OXYGEN SATURATION: 96 % | BODY MASS INDEX: 30.09 KG/M2 | RESPIRATION RATE: 16 BRPM | TEMPERATURE: 97.3 F

## 2025-07-06 DIAGNOSIS — R07.81 RIB PAIN ON RIGHT SIDE: ICD-10-CM

## 2025-07-06 DIAGNOSIS — R07.81 RIB PAIN ON RIGHT SIDE: Primary | ICD-10-CM

## 2025-07-06 PROCEDURE — 3078F DIAST BP <80 MM HG: CPT

## 2025-07-06 PROCEDURE — 71101 X-RAY EXAM UNILAT RIBS/CHEST: CPT | Mod: RT

## 2025-07-06 PROCEDURE — 99214 OFFICE O/P EST MOD 30 MIN: CPT

## 2025-07-06 PROCEDURE — 3074F SYST BP LT 130 MM HG: CPT

## 2025-07-06 RX ORDER — CHLORAL HYDRATE 500 MG
1000 CAPSULE ORAL
COMMUNITY

## 2025-07-06 RX ORDER — IBUPROFEN 200 MG
200 TABLET ORAL EVERY 6 HOURS PRN
COMMUNITY

## 2025-07-06 RX ORDER — ACETAMINOPHEN 325 MG/1
325 TABLET ORAL EVERY 4 HOURS PRN
COMMUNITY

## 2025-07-06 RX ORDER — LIDOCAINE 50 MG/G
1 PATCH TOPICAL EVERY 12 HOURS
Qty: 15 PATCH | Refills: 0 | Status: SHIPPED | OUTPATIENT
Start: 2025-07-06

## 2025-07-06 ASSESSMENT — ENCOUNTER SYMPTOMS: BACK PAIN: 1

## 2025-07-06 ASSESSMENT — FIBROSIS 4 INDEX: FIB4 SCORE: 1.17

## 2025-07-06 NOTE — ED TRIAGE NOTES
"Chief Complaint   Patient presents with    Bloody Stools     X2 days, Describes stool as being soft but not diarrhea. Denies dysuria. -thinner    Back Pain     X3 months, intermittent, right side of lat muscle. Has progressively been getting worse and more frequent. Sometimes radiates to lower flank. Denies dysuria.     Patient ambulatory to triage for above complaint. Patient A&Ox4, GCS 15, patient speaking in full sentences. Equal and unlabored respirations. Patient educated on triage process and encouraged to notify staff if condition worsens. ABD protocols ordered. Patient to phlebotomy in stable condition.     BP (!) 155/87   Pulse 64   Temp 36.6 °C (97.9 °F) (Temporal)   Resp 16   Ht 1.854 m (6' 1\")   Wt 104 kg (229 lb 8 oz)   SpO2 94%   BMI 30.28 kg/m²     "

## 2025-07-06 NOTE — PROGRESS NOTES
"Subjective:   Waldemar Ohara is a 35 y.o. male  Patient presents to the clinic for complaints of right rib pain x 3 months.   Pain is intermittent and comes & goes throughout the day but is present every day. Dull & sore in nature. Pain radiates down the ribs to his right flank and right upper ribs by the chest.   This pain has been worsening over the last 3 months.   Was seen in ER last night for this. PE and UTI/Pyelonephritis ruled out through lab tests. Slightly elevated Lymphocytes & glucose, but otherwise normal CBC and CMP. Declined Xray while in ER but would like to get an Xray now.   Pain has improved since yesterday.   Has taken Tylenol and Advil, which don't help his pain.   Patient denies chest pain, SOB, dizziness/lightheadedness/vertigo, nausea/vomiting/diarrhea, difficulty breathing or swallowing, palpitations or racing heart rate, pain with urination, flank pain, back pain, loss of ROM of the shoulders/trunk/ribcage, cough, fatigue, fever, or chills.       Back Pain        Review of Systems   Musculoskeletal:  Positive for back pain.     Refer to HPI for additional details.    During this visit, appropriate PPE was worn, and hand hygiene was performed.    PMH:  has a past medical history of COVID.    He has no past medical history of Anxiety, Depression, or Hypertension.    MEDS: Current Medications[1]    ALLERGIES: Allergies[2]  SURGHX: Past Surgical History[3]  SOCHX:  reports that he has never smoked. He has never used smokeless tobacco. He reports that he does not currently use alcohol. He reports that he does not use drugs.    FH: Per HPI as applicable/pertinent.    Medications, Allergies, and current problem list reviewed today in Epic.     Objective:     /68 (BP Location: Right arm, Patient Position: Sitting, BP Cuff Size: Adult long)   Pulse (!) 56   Temp 36.3 °C (97.3 °F) (Temporal)   Resp 16   Ht 1.854 m (6' 1\")   Wt 103 kg (227 lb)   SpO2 96%     Physical Exam  Vitals " and nursing note reviewed.   Constitutional:       Appearance: Normal appearance. He is not ill-appearing or toxic-appearing.   HENT:      Head: Normocephalic.      Right Ear: External ear normal.      Left Ear: External ear normal.   Eyes:      Extraocular Movements: Extraocular movements intact.      Conjunctiva/sclera: Conjunctivae normal.   Cardiovascular:      Rate and Rhythm: Normal rate and regular rhythm.      Pulses: Normal pulses.      Heart sounds: Normal heart sounds.   Pulmonary:      Effort: Pulmonary effort is normal. No respiratory distress.      Breath sounds: Normal breath sounds. No stridor. No wheezing, rhonchi or rales.   Chest:      Chest wall: No tenderness.   Abdominal:      General: Abdomen is flat.   Musculoskeletal:      Comments: Right ribs without any tenderness, deformity, erythema, bruising, or swelling.  No CVA tenderness on either side.  Breath sounds WNL.  Torso/trunk and right upper extremity/shoulder with full ROM 5/5 strength.  Normal chest expansion.   Skin:     General: Skin is warm and dry.      Capillary Refill: Capillary refill takes less than 2 seconds.      Coloration: Skin is not jaundiced or pale.   Neurological:      General: No focal deficit present.      Mental Status: He is alert and oriented to person, place, and time.   Psychiatric:         Mood and Affect: Mood normal.         Behavior: Behavior normal.         Thought Content: Thought content normal.         Judgment: Judgment normal.       [id number] (WITH 1-VIEW CXR) RIGHT  Result Date: 7/6/2025 7/6/2025 3:55 PM HISTORY/REASON FOR EXAM: . Right lower rib pain TECHNIQUE/EXAM DESCRIPTION AND NUMBER OF VIEWS:  5 images of the right ribs and chest. COMPARISON: NONE FINDINGS: No displaced fractures or acute bony changes are noted. No airspace opacity or consolidation. There is no evidence of a hemo or pneumothorax. Normal cardiopericardial silhouette.     No displaced rib fracture.      Assessment/Plan:      Diagnosis and associated orders:     1. Rib pain on right side  - [id number] (WITH 1-VIEW CXR) RIGHT; Future    Other orders  - Omega-3 Fatty Acids (FISH OIL) 1000 MG Cap capsule; Take 1,000 mg by mouth 3 times a day with meals.  - ibuprofen (MOTRIN) 200 MG Tab; Take 200 mg by mouth every 6 hours as needed.  - acetaminophen (TYLENOL) 325 MG Tab; Take 325 mg by mouth every four hours as needed.     Comments/MDM:     Reviewed x-ray results with patient, which showed no abnormalities in the right ribs or right lung field. Imaging results and images personally reviewed by this provider as well.  Instructed patient to make an appointment with his PCP for follow-up.  Lidocaine patch as needed prescribed to the patient. Discussed proper administration and dosing as well as associated adverse/side effects of prescribed medications.  Advised patient to continue OTC pharmacologic and nonpharmacologic treatment of her symptoms, including but not limited to Tylenol, Motrin, ice therapy, and plenty of rest and fluids.  Patient agreeable to this plan of care.  All questions answered.  Return to clinic if symptoms persist or worsen.  Red flag symptoms warranting emergency medical services discussed, including but not limited to chest pain, SOB, wheezing, difficulty breathing or swallowing, sensation of throat closing or mass in the throat, severe intractable headache, changes to vision or hearing, palpitations or racing heart rate, abdominal pain, fever greater than 103F despite medication management, severe rib pain, deformity to the ribs, bruising or redness, dizziness/lightheadedness/vertigo, or nausea/vomiting/diarrhea.           Differential diagnosis, natural history, supportive care, and indications for immediate follow-up discussed.    Advised the patient to follow-up with the primary care physician for recheck, reevaluation, and consideration of further management.    Instructed patient to seek emergency  medical attention via calling EMS or going to the Emergency Room for red flag symptoms, including but not limited to: chest pain, palpitations, fever greater than 103F, shortness of breath, wheezing, new or worsened numbness/tingling, focal or unilateral weakness, and bloody vomit/stool.     Please note that this dictation was created using voice recognition software. I have made a reasonable attempt to correct obvious errors, but I expect that there are errors of grammar and possibly content that I did not discover before finalizing the note.    This note was electronically signed by BRENTON Szymanski           [1]   Current Outpatient Medications:     Omega-3 Fatty Acids (FISH OIL) 1000 MG Cap capsule, Take 1,000 mg by mouth 3 times a day with meals., Disp: , Rfl:     ibuprofen (MOTRIN) 200 MG Tab, Take 200 mg by mouth every 6 hours as needed., Disp: , Rfl:     acetaminophen (TYLENOL) 325 MG Tab, Take 325 mg by mouth every four hours as needed., Disp: , Rfl:     multivitamin Tab, Take 1 Tablet by mouth every day., Disp: , Rfl:     naproxen (NAPROSYN) 500 MG Tab, 1 tab Q12 hrs x 3 days then  as needed thereafter for pain (Patient not taking: Reported on 7/6/2025), Disp: 14 Tablet, Rfl: 0  [2] No Known Allergies  [3]   Past Surgical History:  Procedure Laterality Date    NO PERTINENT PAST SURGICAL HISTORY

## 2025-07-06 NOTE — ED NOTES
Discharge instructions reviewed with patient and signed. IV was removed from arm. They verbalized understanding of follow up instructions. All belongings with patient. They ambulate with a steady gait.

## 2025-07-06 NOTE — ED NOTES
Back pain - for 3 months, pt denies any factors that caused it. Bloody stools - bright red last few days, on tissue.

## 2025-07-06 NOTE — ED PROVIDER NOTES
ED Provider Note    CHIEF COMPLAINT  Chief Complaint   Patient presents with    Bloody Stools     X2 days, Describes stool as being soft but not diarrhea. Denies dysuria. -thinner    Back Pain     X3 months, intermittent, right side of lat muscle. Has progressively been getting worse and more frequent. Sometimes radiates to lower flank. Denies dysuria.       EXTERNAL RECORDS REVIEWED  Patient had GI appointment 6/24/2025.  Given a [facility] prescription plan for colonoscopy due to hematochezia and constipation.    HPI/ROS  LIMITATION TO HISTORY   Select: : None  OUTSIDE HISTORIAN(S):  None    Waldemar Ohara is a 35 y.o. male who presents to the ER for about 3 months of intermittent right lateral torso pain, seems to be stemming from his lower rib cage, sometimes his lats, sometimes go further down towards his hip.  He cannot identify any triggering factors.  No other associated symptoms.  No trauma history.  He also has had 2 days of noticing some blood on toilet paper after bowel movements.  Does have a colonoscopy set up for 2 weeks from now.  This does seem to be slowing down.  No rectal pain or abdominal pain.    PAST MEDICAL HISTORY   has a past medical history of COVID.    SURGICAL HISTORY   has a past surgical history that includes no pertinent past surgical history.    FAMILY HISTORY  Family History   Problem Relation Age of Onset    No Known Problems Mother     Hypertension Father     Hyperlipidemia Father     Hypertension Brother     Obesity Brother     Cancer Maternal Grandmother         ovarian    No Known Problems Maternal Grandfather     Cancer Paternal Grandmother         leukemia    No Known Problems Paternal Grandfather     Heart Attack Neg Hx     Heart Disease Neg Hx        SOCIAL HISTORY  Social History     Tobacco Use    Smoking status: Never    Smokeless tobacco: Never   Vaping Use    Vaping status: Never Used   Substance and Sexual Activity    Alcohol use: Not Currently     Comment: rare  "   Drug use: Never    Sexual activity: Not Currently     Partners: Female     Birth control/protection: None       CURRENT MEDICATIONS  Home Medications       Reviewed by Juan Malagon R.N. (Registered Nurse) on 07/05/25 at 2035  Med List Status: Partial     Medication Last Dose Status   multivitamin Tab  Active   naproxen (NAPROSYN) 500 MG Tab  Active                    ALLERGIES  Allergies[1]    PHYSICAL EXAM  VITAL SIGNS: /72   Pulse (!) 53   Temp 36.5 °C (97.7 °F) (Temporal)   Resp 16   Ht 1.854 m (6' 1\")   Wt 104 kg (229 lb 8 oz)   SpO2 94%   BMI 30.28 kg/m²    General: Sitting calmly in stretcher, no distress  HEENT: Moist mucous membranes normal conjunctivae NCAT pupils equal  CV: Regular rate rhythm no murmurs  Pulmonary: CTAB  Chest: No chest wall tenderness palpation  Abdomen: Soft nondistended nontender, no CVA tenderness  Rectal: Patient declined/deferred  Skin: No rashes or jaundice or bruises in area of concern on right lateral torso    EKG/LABS  CBC with normal cell counts, no leukocytosis to suggest infectious process, no anemia normal platelets.  CMP mild hyperglycemia 121 but normal hepatobiliary labs, renal function and electrolytes.  Lipase normal 38.  D-dimer undetectable.    EKG at 2027 NSR rate 68, right axis deviation, normal intervals, Q-wave in aVL, no abnormal T wave inversions, no STEMI.  I have independently interpreted this EKG        COURSE & MEDICAL DECISION MAKING    ASSESSMENT, COURSE AND PLAN  Care Narrative: Differential includes muscle strain, cholelithiasis, kidney stone, PE, muscle spasm, pneumothorax, pleurisy, anal fissure, hemorrhoid, among others    On arrival the patient is well-appearing.  Slightly hypertensive.  Nontoxic.  Benign abdomen.  Nontender chest wall.  Clear lungs.  No overlying skin changes in the area of concern.  Differential broad, above considered.  No acute interventions necessary.  Labs and urine were obtained did not feel any imaging " was necessary at this time given exam.  Urinalysis no sign of infection or blood test just kidney stone, CT scan in February of this year of his abdomen did not show any kidney stones, low concern for nephrolith.  CBC shows normal cell counts, no leukocytosis to suggest occult infectious process.  CMP does not show any sign of acute hepatobiliary process, lipase normal no pancreatitis.  Renal function within normal limits.  Mild hyperglycemia 121.  D-dimer undetectable.  Very unlikely PE.  Based on workup today did not feel any imaging is necessary.  May have musculoskeletal cause of his pain.  He has outpatient CT scan ordered with his PCP and a colonoscopy set up with his gastroenterologist.  He will do these as planned.  Return precautions provided discharged home in stable condition.    DISPOSITION AND DISCUSSIONS    Escalation of care considered, and ultimately not performed:diagnostic imaging    Barriers to care at this time, including but not limited to: None.     Decision tools and prescription drugs considered including, but not limited to: Recommended ibuprofen/Tylenol.    FINAL DIAGNOSIS  1. Right-sided chest wall pain    2. Right flank pain         Electronically signed by: Garcia Murrieta M.D., 7/5/2025 8:52 PM           [1] No Known Allergies

## 2025-07-06 NOTE — DISCHARGE INSTRUCTIONS
Today your blood work and urine test were very reassuring.  Keep an eye on your symptoms.  You can get the CT scan done if you would like with your primary doctor.  Make sure you go to your colonoscopy.  Try taking some ibuprofen and Tylenol to help with the pain if you get again in the future

## 2025-07-10 ENCOUNTER — OFFICE VISIT (OUTPATIENT)
Dept: MEDICAL GROUP | Facility: LAB | Age: 35
End: 2025-07-10
Payer: COMMERCIAL

## 2025-07-10 VITALS
RESPIRATION RATE: 18 BRPM | OXYGEN SATURATION: 98 % | TEMPERATURE: 97 F | HEIGHT: 73 IN | SYSTOLIC BLOOD PRESSURE: 112 MMHG | HEART RATE: 53 BPM | DIASTOLIC BLOOD PRESSURE: 70 MMHG | BODY MASS INDEX: 29.95 KG/M2

## 2025-07-10 DIAGNOSIS — R07.89 ATYPICAL CHEST PAIN: Primary | ICD-10-CM

## 2025-07-10 PROCEDURE — 99395 PREV VISIT EST AGE 18-39: CPT | Performed by: STUDENT IN AN ORGANIZED HEALTH CARE EDUCATION/TRAINING PROGRAM

## 2025-07-10 PROCEDURE — 3074F SYST BP LT 130 MM HG: CPT | Performed by: STUDENT IN AN ORGANIZED HEALTH CARE EDUCATION/TRAINING PROGRAM

## 2025-07-10 PROCEDURE — 3078F DIAST BP <80 MM HG: CPT | Performed by: STUDENT IN AN ORGANIZED HEALTH CARE EDUCATION/TRAINING PROGRAM

## 2025-07-10 PROCEDURE — 99213 OFFICE O/P EST LOW 20 MIN: CPT | Mod: 25 | Performed by: STUDENT IN AN ORGANIZED HEALTH CARE EDUCATION/TRAINING PROGRAM

## 2025-07-10 RX ORDER — MELOXICAM 15 MG/1
15 TABLET ORAL DAILY
Qty: 30 TABLET | Refills: 0 | Status: SHIPPED | OUTPATIENT
Start: 2025-07-10 | End: 2025-08-09

## 2025-07-10 ASSESSMENT — ENCOUNTER SYMPTOMS
CHILLS: 0
FEVER: 0
SHORTNESS OF BREATH: 0

## 2025-07-10 NOTE — PROGRESS NOTES
Subjective:     Subjective:     CC:   Chief Complaint   Patient presents with    Annual Exam    Follow-Up     F/u from [facility] er from a week ago        HPI:   Waldemar Ohara is a 35 y.o. male who presents for annual exam  History of Present Illness  The patient presents for right-sided pain.    Right-Sided Pain  - He recently visited the ER due to right-sided pain, where he underwent x-rays and blood tests to rule out a blood clot. The results were normal.  - The pain is intermittent, sometimes absent during gym workouts but present when sitting or moving in certain ways.  - It can also appear randomly, such as when standing or walking.  - He has been trying to identify potential triggers for the pain, such as eating or exercising, but it seems to occur without a specific cause.  - The location of the pain varies, with some days it is under his armpit and other days it is in his back.  - The pain is described as a hot pressure sensation that radiates from his back to his armpit behind the chest.  - In recent days, the pain has shifted to a lower position and is more akin to a stabbing or sore sensation.  - He has a CT scan scheduled for next week.    Medication History  - He has not taken naproxen, which was prescribed in 04/2025.  - His most recent medication was omeprazole, prescribed by a gastroenterologist.  - He has also taken NAC and over-the-counter Tylenol or Advil.  - He has not been on any medications recently.    Back Pain  - A back MRI was conducted in the fall of 2024 due to lower back pain, which revealed some findings but nothing serious.  - At that time, he was not experiencing the current right-sided pain.    Other Symptoms  - He reports no rash.  - He also mentions that his lymphocytes were slightly elevated during his ER visit, but the ER doctor did not express concern.  - He reports cloudy urine and notes that a urine test was conducted, which returned abnormal results.  - He has a  "colonoscopy scheduled for next week.      Last Colorectal Cancer Screening: NA  Last Tdap: 2024  Received HPV series: No  Hx STDs: No    Exercise: 4x/week with weights   Diet: average       He  has a past medical history of COVID.    He has no past medical history of Anxiety, Depression, or Hypertension.  He  has a past surgical history that includes no pertinent past surgical history.    Family History   Problem Relation Age of Onset    No Known Problems Mother     Hypertension Father     Hyperlipidemia Father     Hypertension Brother     Obesity Brother     Cancer Maternal Grandmother         ovarian    No Known Problems Maternal Grandfather     Cancer Paternal Grandmother         leukemia    No Known Problems Paternal Grandfather     Heart Attack Neg Hx     Heart Disease Neg Hx      Social History[1]  He  reports that he is not currently sexually active and has had partner(s) who are female. He reports using the following method of birth control/protection: None.    Patient Active Problem List    Diagnosis Date Noted    Snoring 05/08/2025    Change in bowel habits 04/24/2025    Brain fog 04/24/2025    Intermittent lightheadedness 04/24/2025    Bradycardia 04/24/2025    Rib pain 04/16/2025    Aphthous ulcer 01/07/2025    Esophagitis 12/03/2024    Abnormal stools 12/03/2024    Pulmonary nodules 12/03/2024    Numbness and tingling of both lower extremities 09/26/2024    Other fatigue 09/26/2024    Encounter for completion of form with patient 09/26/2024     Current Medications[2]  Allergies[3]    Review of Systems   Review of Systems   Constitutional:  Negative for chills and fever.   Respiratory:  Negative for shortness of breath.    Cardiovascular:  Negative for chest pain.   Musculoskeletal:         Right sided chest wall pain         Objective:   /70   Pulse (!) 53   Temp 36.1 °C (97 °F) (Temporal)   Resp 18   Ht 1.854 m (6' 1\")   SpO2 98%   BMI 29.95 kg/m²      Wt Readings from Last 4 Encounters: "   07/06/25 103 kg (227 lb)   07/05/25 104 kg (229 lb 8 oz)   06/05/25 103 kg (227 lb)   05/08/25 106 kg (234 lb 5.6 oz)           Physical Exam:  Physical Exam  Constitutional:       General: He is not in acute distress.     Appearance: Normal appearance. He is not ill-appearing.   HENT:      Head: Normocephalic and atraumatic.      Right Ear: Tympanic membrane and ear canal normal.      Left Ear: Tympanic membrane and ear canal normal.      Mouth/Throat:      Mouth: Mucous membranes are moist.      Pharynx: Oropharynx is clear.   Eyes:      Extraocular Movements: Extraocular movements intact.      Pupils: Pupils are equal, round, and reactive to light.   Neck:      Thyroid: No thyromegaly.   Cardiovascular:      Rate and Rhythm: Normal rate and regular rhythm.      Heart sounds: Normal heart sounds.   Pulmonary:      Effort: Pulmonary effort is normal.      Breath sounds: Normal breath sounds.   Abdominal:      General: Abdomen is flat. Bowel sounds are normal.      Palpations: Abdomen is soft.   Musculoskeletal:      Cervical back: Normal range of motion and neck supple.      Right lower leg: No edema.      Left lower leg: No edema.   Lymphadenopathy:      Cervical: No cervical adenopathy.   Skin:     General: Skin is warm and dry.   Neurological:      General: No focal deficit present.      Mental Status: He is alert.   Psychiatric:         Mood and Affect: Mood normal.         Behavior: Behavior normal.         Thought Content: Thought content normal.         Judgment: Judgment normal.             Assessment and Plan:     Assessment & Plan  1. Right-sided pain: Chronic.  - Symptoms suggest a possible musculoskeletal issue, potentially related to scoliosis. Pain is random, occurring in different spots, sometimes feeling like hot pressure and other times like stabbing pain.  - Physical examination revealed mild triggering of pain when raising the left arm  - Scoliosis x-ray will be ordered to further investigate  the possibility of scoliosis.  - Meloxicam 15 mg once daily for a month has been prescribed for both diagnostic and therapeutic purposes.  - If the pain improves with meloxicam, it would suggest a musculoskeletal cause.      Follow-up  - CT scan scheduled for next week.  - Colonoscopy scheduled for next week.      Problem List Items Addressed This Visit    None  Visit Diagnoses         Atypical chest pain    -  Primary    Relevant Medications    meloxicam (MOBIC) 15 MG tablet    Other Relevant Orders    DX-SPINE-SCOLIOSIS STUDY             Health maintenance:    Labs per orders  Immunizations per orders  Age-appropriate guidance discussed including diet and exercise  Discussed importance of cardiovascular exercise     Follow-up: 1 year annual          [1]   Social History  Tobacco Use    Smoking status: Never    Smokeless tobacco: Never   Vaping Use    Vaping status: Never Used   Substance Use Topics    Alcohol use: Not Currently     Comment: rare    Drug use: Never   [2]   Current Outpatient Medications   Medication Sig Dispense Refill    meloxicam (MOBIC) 15 MG tablet Take 1 Tablet by mouth every day for 30 days. Take with food 30 Tablet 0    Omega-3 Fatty Acids (FISH OIL) 1000 MG Cap capsule Take 1,000 mg by mouth 3 times a day with meals.      ibuprofen (MOTRIN) 200 MG Tab Take 200 mg by mouth every 6 hours as needed.      acetaminophen (TYLENOL) 325 MG Tab Take 325 mg by mouth every four hours as needed.      lidocaine (LIDODERM) 5 % Patch Place 1 Patch on the skin every 12 hours. 15 Patch 0    multivitamin Tab Take 1 Tablet by mouth every day.      naproxen (NAPROSYN) 500 MG Tab 1 tab Q12 hrs x 3 days then  as needed thereafter for pain (Patient not taking: Reported on 7/10/2025) 14 Tablet 0     No current facility-administered medications for this visit.   [3] No Known Allergies

## 2025-07-11 ENCOUNTER — APPOINTMENT (OUTPATIENT)
Dept: CARDIOLOGY | Facility: MEDICAL CENTER | Age: 35
End: 2025-07-11
Attending: INTERNAL MEDICINE
Payer: COMMERCIAL

## 2025-07-18 ENCOUNTER — HOSPITAL ENCOUNTER (OUTPATIENT)
Dept: RADIOLOGY | Facility: MEDICAL CENTER | Age: 35
End: 2025-07-18
Attending: STUDENT IN AN ORGANIZED HEALTH CARE EDUCATION/TRAINING PROGRAM
Payer: COMMERCIAL

## 2025-07-18 DIAGNOSIS — R07.89 ATYPICAL CHEST PAIN: ICD-10-CM

## 2025-07-18 PROCEDURE — 71275 CT ANGIOGRAPHY CHEST: CPT

## 2025-07-18 PROCEDURE — 700117 HCHG RX CONTRAST REV CODE 255: Performed by: STUDENT IN AN ORGANIZED HEALTH CARE EDUCATION/TRAINING PROGRAM

## 2025-07-18 RX ADMIN — IOHEXOL 100 ML: 350 INJECTION, SOLUTION INTRAVENOUS at 11:21

## 2025-07-24 ENCOUNTER — APPOINTMENT (OUTPATIENT)
Dept: RADIOLOGY | Facility: MEDICAL CENTER | Age: 35
End: 2025-07-24
Attending: STUDENT IN AN ORGANIZED HEALTH CARE EDUCATION/TRAINING PROGRAM
Payer: COMMERCIAL

## 2025-07-24 DIAGNOSIS — R07.89 ATYPICAL CHEST PAIN: ICD-10-CM

## 2025-07-24 PROCEDURE — 72081 X-RAY EXAM ENTIRE SPI 1 VW: CPT

## 2025-08-22 ENCOUNTER — OFFICE VISIT (OUTPATIENT)
Dept: URGENT CARE | Facility: PHYSICIAN GROUP | Age: 35
End: 2025-08-22
Payer: COMMERCIAL

## 2025-08-22 ENCOUNTER — HOSPITAL ENCOUNTER (OUTPATIENT)
Facility: MEDICAL CENTER | Age: 35
End: 2025-08-22
Attending: NURSE PRACTITIONER
Payer: COMMERCIAL

## 2025-08-22 VITALS
BODY MASS INDEX: 30.39 KG/M2 | HEART RATE: 56 BPM | TEMPERATURE: 97 F | SYSTOLIC BLOOD PRESSURE: 118 MMHG | WEIGHT: 229.3 LBS | RESPIRATION RATE: 16 BRPM | HEIGHT: 73 IN | DIASTOLIC BLOOD PRESSURE: 80 MMHG | OXYGEN SATURATION: 98 %

## 2025-08-22 DIAGNOSIS — R39.9 UTI SYMPTOMS: ICD-10-CM

## 2025-08-22 DIAGNOSIS — R52 BODY ACHES: ICD-10-CM

## 2025-08-22 DIAGNOSIS — R39.9 UTI SYMPTOMS: Primary | ICD-10-CM

## 2025-08-22 LAB
APPEARANCE UR: CLEAR
BILIRUB UR STRIP-MCNC: NEGATIVE MG/DL
COLOR UR AUTO: NORMAL
FLUAV RNA SPEC QL NAA+PROBE: NEGATIVE
FLUBV RNA SPEC QL NAA+PROBE: NEGATIVE
GLUCOSE UR STRIP.AUTO-MCNC: NEGATIVE MG/DL
KETONES UR STRIP.AUTO-MCNC: NEGATIVE MG/DL
LEUKOCYTE ESTERASE UR QL STRIP.AUTO: NEGATIVE
NITRITE UR QL STRIP.AUTO: NEGATIVE
PH UR STRIP.AUTO: 6 [PH] (ref 5–8)
PROT UR QL STRIP: NEGATIVE MG/DL
RBC UR QL AUTO: NEGATIVE
RSV RNA SPEC QL NAA+PROBE: NEGATIVE
SARS-COV-2 RNA RESP QL NAA+PROBE: NEGATIVE
SP GR UR STRIP.AUTO: 1.01
UROBILINOGEN UR STRIP-MCNC: 0.2 MG/DL

## 2025-08-22 PROCEDURE — 87086 URINE CULTURE/COLONY COUNT: CPT

## 2025-08-22 PROCEDURE — 99214 OFFICE O/P EST MOD 30 MIN: CPT | Performed by: NURSE PRACTITIONER

## 2025-08-22 PROCEDURE — 87637 SARSCOV2&INF A&B&RSV AMP PRB: CPT | Performed by: NURSE PRACTITIONER

## 2025-08-22 PROCEDURE — 3079F DIAST BP 80-89 MM HG: CPT | Performed by: NURSE PRACTITIONER

## 2025-08-22 PROCEDURE — 3074F SYST BP LT 130 MM HG: CPT | Performed by: NURSE PRACTITIONER

## 2025-08-22 PROCEDURE — 81002 URINALYSIS NONAUTO W/O SCOPE: CPT | Performed by: NURSE PRACTITIONER

## 2025-08-22 ASSESSMENT — FIBROSIS 4 INDEX: FIB4 SCORE: 1.17

## 2025-08-23 ENCOUNTER — HOSPITAL ENCOUNTER (EMERGENCY)
Facility: MEDICAL CENTER | Age: 35
End: 2025-08-23
Attending: STUDENT IN AN ORGANIZED HEALTH CARE EDUCATION/TRAINING PROGRAM
Payer: COMMERCIAL

## 2025-08-23 VITALS
RESPIRATION RATE: 16 BRPM | OXYGEN SATURATION: 95 % | SYSTOLIC BLOOD PRESSURE: 129 MMHG | HEIGHT: 73 IN | HEART RATE: 48 BPM | WEIGHT: 227.07 LBS | DIASTOLIC BLOOD PRESSURE: 73 MMHG | BODY MASS INDEX: 30.09 KG/M2 | TEMPERATURE: 97.6 F

## 2025-08-23 DIAGNOSIS — R53.83 FATIGUE, UNSPECIFIED TYPE: ICD-10-CM

## 2025-08-23 DIAGNOSIS — R07.89 OTHER CHEST PAIN: ICD-10-CM

## 2025-08-23 DIAGNOSIS — R41.89 BRAIN FOG: Primary | ICD-10-CM

## 2025-08-23 LAB
ALBUMIN SERPL BCP-MCNC: 4.2 G/DL (ref 3.2–4.9)
ALBUMIN/GLOB SERPL: 1.5 G/DL
ALP SERPL-CCNC: 67 U/L (ref 30–99)
ALT SERPL-CCNC: 19 U/L (ref 2–50)
ANION GAP SERPL CALC-SCNC: 12 MMOL/L (ref 7–16)
AST SERPL-CCNC: 22 U/L (ref 12–45)
BASOPHILS # BLD AUTO: 0.4 % (ref 0–1.8)
BASOPHILS # BLD: 0.02 K/UL (ref 0–0.12)
BILIRUB SERPL-MCNC: 0.5 MG/DL (ref 0.1–1.5)
BUN SERPL-MCNC: 13 MG/DL (ref 8–22)
CALCIUM ALBUM COR SERPL-MCNC: 9.3 MG/DL (ref 8.5–10.5)
CALCIUM SERPL-MCNC: 9.5 MG/DL (ref 8.5–10.5)
CHLORIDE SERPL-SCNC: 106 MMOL/L (ref 96–112)
CO2 SERPL-SCNC: 23 MMOL/L (ref 20–33)
CREAT SERPL-MCNC: 1.02 MG/DL (ref 0.5–1.4)
EKG IMPRESSION: NORMAL
EOSINOPHIL # BLD AUTO: 0.09 K/UL (ref 0–0.51)
EOSINOPHIL NFR BLD: 1.7 % (ref 0–6.9)
ERYTHROCYTE [DISTWIDTH] IN BLOOD BY AUTOMATED COUNT: 39.8 FL (ref 35.9–50)
GFR SERPLBLD CREATININE-BSD FMLA CKD-EPI: 98 ML/MIN/1.73 M 2
GLOBULIN SER CALC-MCNC: 2.8 G/DL (ref 1.9–3.5)
GLUCOSE SERPL-MCNC: 120 MG/DL (ref 65–99)
HCT VFR BLD AUTO: 45.1 % (ref 42–52)
HGB BLD-MCNC: 15.3 G/DL (ref 14–18)
IMM GRANULOCYTES # BLD AUTO: 0.03 K/UL (ref 0–0.11)
IMM GRANULOCYTES NFR BLD AUTO: 0.6 % (ref 0–0.9)
LYMPHOCYTES # BLD AUTO: 1.79 K/UL (ref 1–4.8)
LYMPHOCYTES NFR BLD: 34.1 % (ref 22–41)
MCH RBC QN AUTO: 30.1 PG (ref 27–33)
MCHC RBC AUTO-ENTMCNC: 33.9 G/DL (ref 32.3–36.5)
MCV RBC AUTO: 88.6 FL (ref 81.4–97.8)
MONOCYTES # BLD AUTO: 0.33 K/UL (ref 0–0.85)
MONOCYTES NFR BLD AUTO: 6.3 % (ref 0–13.4)
NEUTROPHILS # BLD AUTO: 2.99 K/UL (ref 1.82–7.42)
NEUTROPHILS NFR BLD: 56.9 % (ref 44–72)
NRBC # BLD AUTO: 0 K/UL
NRBC BLD-RTO: 0 /100 WBC (ref 0–0.2)
PLATELET # BLD AUTO: 164 K/UL (ref 164–446)
PMV BLD AUTO: 12 FL (ref 9–12.9)
POTASSIUM SERPL-SCNC: 3.8 MMOL/L (ref 3.6–5.5)
PROT SERPL-MCNC: 7 G/DL (ref 6–8.2)
RBC # BLD AUTO: 5.09 M/UL (ref 4.7–6.1)
SODIUM SERPL-SCNC: 141 MMOL/L (ref 135–145)
TSH SERPL-ACNC: 2 UIU/ML (ref 0.38–5.33)
WBC # BLD AUTO: 5.3 K/UL (ref 4.8–10.8)

## 2025-08-23 PROCEDURE — 84443 ASSAY THYROID STIM HORMONE: CPT

## 2025-08-23 PROCEDURE — 99283 EMERGENCY DEPT VISIT LOW MDM: CPT

## 2025-08-23 PROCEDURE — 36415 COLL VENOUS BLD VENIPUNCTURE: CPT

## 2025-08-23 PROCEDURE — 85025 COMPLETE CBC W/AUTO DIFF WBC: CPT

## 2025-08-23 PROCEDURE — 80053 COMPREHEN METABOLIC PANEL: CPT

## 2025-08-23 PROCEDURE — 93005 ELECTROCARDIOGRAM TRACING: CPT | Mod: TC | Performed by: STUDENT IN AN ORGANIZED HEALTH CARE EDUCATION/TRAINING PROGRAM

## 2025-08-23 ASSESSMENT — FIBROSIS 4 INDEX: FIB4 SCORE: 1.17

## 2025-08-23 ASSESSMENT — PAIN DESCRIPTION - PAIN TYPE
TYPE: ACUTE PAIN
TYPE: ACUTE PAIN

## 2025-08-25 LAB
BACTERIA UR CULT: NORMAL
SIGNIFICANT IND 70042: NORMAL
SITE SITE: NORMAL
SOURCE SOURCE: NORMAL